# Patient Record
Sex: FEMALE | Race: WHITE | NOT HISPANIC OR LATINO | Employment: OTHER | URBAN - METROPOLITAN AREA
[De-identification: names, ages, dates, MRNs, and addresses within clinical notes are randomized per-mention and may not be internally consistent; named-entity substitution may affect disease eponyms.]

---

## 2017-01-11 ENCOUNTER — OFFICE VISIT (OUTPATIENT)
Dept: FAMILY MEDICINE | Facility: CLINIC | Age: 67
End: 2017-01-11

## 2017-01-11 VITALS
RESPIRATION RATE: 16 BRPM | SYSTOLIC BLOOD PRESSURE: 120 MMHG | TEMPERATURE: 98.2 F | HEIGHT: 63 IN | DIASTOLIC BLOOD PRESSURE: 80 MMHG | BODY MASS INDEX: 31.71 KG/M2 | HEART RATE: 64 BPM | OXYGEN SATURATION: 97 % | WEIGHT: 179 LBS

## 2017-01-11 DIAGNOSIS — M19.071 PRIMARY OSTEOARTHRITIS OF BOTH FEET: ICD-10-CM

## 2017-01-11 DIAGNOSIS — M19.072 PRIMARY OSTEOARTHRITIS OF BOTH FEET: ICD-10-CM

## 2017-01-11 DIAGNOSIS — K21.9 GASTROESOPHAGEAL REFLUX DISEASE WITHOUT ESOPHAGITIS: ICD-10-CM

## 2017-01-11 DIAGNOSIS — I10 ESSENTIAL HYPERTENSION, BENIGN: ICD-10-CM

## 2017-01-11 DIAGNOSIS — J45.30 MILD PERSISTENT ASTHMA WITHOUT COMPLICATION: ICD-10-CM

## 2017-01-11 DIAGNOSIS — E78.2 MIXED HYPERLIPIDEMIA: Primary | ICD-10-CM

## 2017-01-11 DIAGNOSIS — Z76.0 ENCOUNTER FOR MEDICATION REFILL: ICD-10-CM

## 2017-01-11 LAB
% GRANULOCYTES: 70.8 %
HCT VFR BLD AUTO: 46.3 % (ref 35–47)
HEMOGLOBIN: 15.7 G/DL (ref 11.7–15.7)
LYMPHOCYTES NFR BLD AUTO: 22.1 %
MCH RBC QN AUTO: 30.8 PG (ref 26–33)
MCHC RBC AUTO-ENTMCNC: 33.9 G/DL (ref 31–36)
MCV RBC AUTO: 90.9 FL (ref 78–100)
MONOCYTES NFR BLD AUTO: 7.1 %
PLATELET COUNT - QUEST: 310 10^9/L (ref 150–375)
RBC # BLD AUTO: 5.09 10*12/L (ref 3.8–5.2)
WBC # BLD AUTO: 7.4 10*9/L (ref 4–11)

## 2017-01-11 PROCEDURE — 99214 OFFICE O/P EST MOD 30 MIN: CPT | Performed by: FAMILY MEDICINE

## 2017-01-11 PROCEDURE — 80061 LIPID PANEL: CPT | Mod: 90 | Performed by: FAMILY MEDICINE

## 2017-01-11 PROCEDURE — 36415 COLL VENOUS BLD VENIPUNCTURE: CPT | Performed by: FAMILY MEDICINE

## 2017-01-11 PROCEDURE — 80053 COMPREHEN METABOLIC PANEL: CPT | Mod: 90 | Performed by: FAMILY MEDICINE

## 2017-01-11 PROCEDURE — 85025 COMPLETE CBC W/AUTO DIFF WBC: CPT | Performed by: FAMILY MEDICINE

## 2017-01-11 RX ORDER — ATORVASTATIN CALCIUM 20 MG/1
20 TABLET, FILM COATED ORAL DAILY
Qty: 90 TABLET | Refills: 1 | Status: SHIPPED | OUTPATIENT
Start: 2017-01-11 | End: 2017-07-06

## 2017-01-11 RX ORDER — MELOXICAM 15 MG/1
1 TABLET ORAL DAILY
COMMUNITY
Start: 2016-12-20 | End: 2017-12-21

## 2017-01-11 RX ORDER — TRIAMTERENE/HYDROCHLOROTHIAZID 37.5-25 MG
1 TABLET ORAL DAILY
Qty: 90 TABLET | Refills: 1 | Status: SHIPPED | OUTPATIENT
Start: 2017-01-11 | End: 2017-07-06

## 2017-01-11 RX ORDER — ALBUTEROL SULFATE 90 UG/1
1-2 AEROSOL, METERED RESPIRATORY (INHALATION) EVERY 4 HOURS PRN
Qty: 2 INHALER | Refills: 1 | Status: SHIPPED | OUTPATIENT
Start: 2017-01-11 | End: 2018-02-07

## 2017-01-11 NOTE — PROGRESS NOTES
"1.SUBJECTIVE:  Priscilla Linda is an 66 year old female who presents for evaluation of   Hyperlipidemia, hypertension, and GERD.   See second note regarding asthma.    She also needs her asthma medications renewed.  She has been diagnosed in the past as having   combined hyperlipidemia. She indicates that she is feeling well   and denies any symptoms of cardiovascular disease. Specifically   denies chest pain, palpitations, dyspnea, orthopnea, PND,   claudication or peripheral edema. Treatment modalities employed to   this point include diet, regular aerobic exercise, weight loss and Lipitor. Current medication   regimen is as listed below. Patient denies any side effects of   medication.    Family history: positive for hypertension, diabetes mellitus and cardiovascular disease  Age at diagnosis of hyperlipidemia: 56 and hypertension age 50  Cardiovascular risk factors: family history, lipids, hypertension, obesity and sedentary life style    Current Outpatient Prescriptions   Medication     meloxicam (MOBIC) 15 MG tablet     triamterene-hydrochlorothiazide (MAXZIDE-25) 37.5-25 MG per tablet     omeprazole (PRILOSEC) 20 MG capsule     ASPIRIN NOT PRESCRIBED (INTENTIONAL)     acyclovir (ZOVIRAX) 800 MG tablet     albuterol (ALBUTEROL) 108 (90 BASE) MCG/ACT inhaler     PULMICORT FLEXHALER 180 MCG/ACT inhaler     atorvastatin (LIPITOR) 20 MG tablet     Lutein 10 MG TABS     MULTI-VITAMIN/IRON OR TABS     FISH OIL 1360 MG OR CAPS     No current facility-administered medications for this visit.     No Known Allergies    Social History   Substance Use Topics     Smoking status: Never Smoker      Smokeless tobacco: Never Used     Alcohol Use: 0.0 oz/week     0 drink(s) per week      Comment: very occ       OBJECTIVE:  /80 mmHg  Pulse 64  Temp(Src) 98.2  F (36.8  C) (Oral)  Resp 16  Ht 1.6 m (5' 3\")  Wt 81.194 kg (179 lb)  BMI 31.72 kg/m2  SpO2 97%  Repeat BP R arm seated = 120/80 with regular size " cuff.  Skin: negative  Fundi: deferred  Lungs: negative, Percussion normal. Good diaphragmatic excursion. Lungs clear  Heart: negative, PMI normal. No lifts, heaves, or thrills. RRR. No murmurs, clicks gallops or rub  Peripheral pulses: radial=4/4, femoral=4/4, popliteal=4/4, dorsalis pedis=4/4,  Abd; The abdomen is soft without tenderness, guarding, mass or organomegaly. Bowel sounds are normal. No CVA tenderness or inguinal adenopathy noted.  BMI : Body mass index is 31.72 kg/(m^2).    ASSESSMENT:  (E78.2) Mixed hyperlipidemia  (primary encounter diagnosis)  Plan: VENIPUNC FNGR,HEEL,EAR [57001], LIPID PANEL,         atorvastatin (LIPITOR) 20 MG tablet        1)  Medication: continue current medication regimen unchanged  2)  Low fat, low cholesterol diet  3)  Regular aerobic exercise  4)  Recheck in 6 months, sooner should new symptoms or   problems arise.    Patient Education: Reviewed risks of elevated lipids and principles   of treatment.        (I10) Essential hypertension, benign  Plan: VENIPUNC FNGR,HEEL,EAR [55358], COMPREHENSIVE         METABOLIC PANEL,         triamterene-hydrochlorothiazide (MAXZIDE-25)         37.5-25 MG per tablet        1)  Medication: continue current medication regimen unchanged  2)  Dietary sodium restriction  3)  Regular aerobic exercise  4)  Recheck in 6 months, sooner should new symptoms or   problems arise.    Patient Education: Reviewed risks of hypertension and principles of   treatment.        (K21.9) Gastroesophageal reflux disease without esophagitis  Plan: VENIPUNC FNGR,HEEL,EAR [85845], AUTO         HEMOGRAM/PLATE/DIFF [03593.000], omeprazole         (PRILOSEC) 20 MG CR capsule        Potential medication side effects were discussed with the patient; let me know if any occur.      (J45.30) Mild persistent asthma without complication  Comment: see note below  Plan: Asthma Action Plan (AAP), albuterol (ALBUTEROL)        108 (90 BASE) MCG/ACT Inhaler, budesonide          (PULMICORT FLEXHALER) 180 MCG/ACT inhaler,         Asthma Action Plan (AAP)        Start bid use of steroid inhaler and monitor response    (Z76.0) Encounter for medication refill  Plan: VENIPUNC FNGR,HEEL,EAR [86243], AUTO         HEMOGRAM/PLATE/DIFF [85970.000], LIPID PANEL,         COMPREHENSIVE METABOLIC PANEL            (M19.071,  M19.072) Primary osteoarthritis of both feet  Plan: meloxicam (MOBIC) 15 MG tablet, VENIPUNC         FNGR,HEEL,EAR [52328], COMPREHENSIVE METABOLIC         PANEL        I have reviewed the patient's medical history in detail and updated the computerized patient record.      Priscilla Linda is a 66 year old female who presents to the clinic for recheck on reactive airways disease.    Last exacerbation was 3 months ago.    Overall the patient feels improved with less wheezing and less shortness of breath since starting steroid inhaler. Finds herself coughing at night when she lays down. Only takes it in the morning instead of twice daily dosing    Current medications:  Current Outpatient Prescriptions   Medication Sig Dispense Refill     meloxicam (MOBIC) 15 MG tablet Take 1 tablet by mouth daily       triamterene-hydrochlorothiazide (MAXZIDE-25) 37.5-25 MG per tablet Take 1 tablet by mouth daily 90 tablet 1     omeprazole (PRILOSEC) 20 MG CR capsule Take 1 capsule (20 mg) by mouth daily 90 capsule 3     atorvastatin (LIPITOR) 20 MG tablet Take 1 tablet (20 mg) by mouth daily 90 tablet 1     albuterol (ALBUTEROL) 108 (90 BASE) MCG/ACT Inhaler Inhale 1-2 puffs into the lungs every 4 hours as needed 2 Inhaler 1     budesonide (PULMICORT FLEXHALER) 180 MCG/ACT inhaler Inhale 1 puff into the lungs 2 times daily 3 Inhaler 3     ASPIRIN NOT PRESCRIBED (INTENTIONAL) continuous prn for other Antiplatelet medication not prescribed intentionally due to Refusal by patient and Not indicated based on age       acyclovir (ZOVIRAX) 800 MG tablet For outbreaks 24 tablet 1     Lutein 10 MG TABS     "    MULTI-VITAMIN/IRON OR TABS 1 TABLET DAILY       FISH OIL 1360 MG OR CAPS 1 CAPSULE DAILY       [DISCONTINUED] triamterene-hydrochlorothiazide (MAXZIDE-25) 37.5-25 MG per tablet Take 1 tablet by mouth daily 90 tablet 1     [DISCONTINUED] albuterol (ALBUTEROL) 108 (90 BASE) MCG/ACT inhaler Inhale 1-2 puffs into the lungs every 4 hours as needed 1 Inhaler 1     [DISCONTINUED] PULMICORT FLEXHALER 180 MCG/ACT inhaler INHALE 1 PUFF EVERY DAY 1 Inhaler 2     [DISCONTINUED] atorvastatin (LIPITOR) 20 MG tablet Take 1 tablet (20 mg) by mouth daily 90 tablet 3       Past Medical History:  Past Medical History   Diagnosis Date     Asthma      High cholesterol      Hypertension      GERD (gastroesophageal reflux disease)        EXAM:  VITALS: Blood pressure 120/80, pulse 64, temperature 98.2  F (36.8  C), temperature source Oral, resp. rate 16, height 1.6 m (5' 3\"), weight 81.194 kg (179 lb), SpO2 97 %.  RESP: Normal - Clear to auscultation without rales, rhonchi, or wheezing.  CARDIAC: NORMAL - regular rate and rhythm without murmur.  ABDOMEN: Abdomen soft, non-tender. BS normal. No masses, organomegaly    Assessment:  Reactive Airways, Improving    Plan:  Patient received information on asthma today  Steroid inhaler started at twice daily dosing/ monitor  Follow up in 1 year.  "

## 2017-01-11 NOTE — NURSING NOTE
Patient is here for a recheck of their medication.  Pre-Visit Screening :  Immunizations : up to date    Colonoscopy : is up to date  Mammogram : is up to date  Asthma Action Test/Plan : na  PHQ9/GAD7 :  na    BP done on the left arm, with a lg sized cuff.  Pulse - regular  My Chart - accepts    CLASSIFICATION OF OVERWEIGHT AND OBESITY BY BMI                         Obesity Class           BMI(kg/m2)  Underweight                                    < 18.5  Normal                                         18.5-24.9  Overweight                                     25.0-29.9  OBESITY                     I                  30.0-34.9                              II                 35.0-39.9  EXTREME OBESITY             III                >40                             Patient's  BMI Body mass index is 31.72 kg/(m^2).  http://hin.nhlbi.nih.gov/menuplanner/menu.cgi  Questioned patient about current smoking habits.  Pt. has never smoked.

## 2017-01-11 NOTE — PATIENT INSTRUCTIONS
1)  Medication: continue current medication regimen unchanged  2)  Low fat, low cholesterol diet and low salt  3)  Regular aerobic exercise and weight loss  4)  Recheck in 6 months, sooner should new symptoms or   problems arise.    Patient Education: Reviewed risks of elevated lipids and principles   of treatment.    Patient received information on asthma today  Steroid inhaler started at twice daily dosing/ monitor  Follow up in 1 year

## 2017-01-12 LAB
ALBUMIN SERPL-MCNC: 4.8 G/DL (ref 3.6–5.1)
ALBUMIN/GLOB SERPL: 1.9 (CALC) (ref 1–2.5)
ALP SERPL-CCNC: 112 U/L (ref 33–130)
ALT SERPL-CCNC: 38 U/L (ref 6–29)
AST SERPL-CCNC: 30 U/L (ref 10–35)
BILIRUB SERPL-MCNC: 0.9 MG/DL (ref 0.2–1.2)
BUN SERPL-MCNC: 22 MG/DL (ref 7–25)
BUN/CREATININE RATIO: ABNORMAL (CALC) (ref 6–22)
CALCIUM SERPL-MCNC: 10.5 MG/DL (ref 8.6–10.4)
CHLORIDE SERPLBLD-SCNC: 103 MMOL/L (ref 98–110)
CHOLEST SERPL-MCNC: 268 MG/DL (ref 125–200)
CHOLEST/HDLC SERPL: 3.2 (CALC)
CO2 SERPL-SCNC: 25 MMOL/L (ref 20–31)
CREAT SERPL-MCNC: 0.89 MG/DL (ref 0.5–0.99)
EGFR AFRICAN AMERICAN - QUEST: 79 ML/MIN/1.73M2
GFR SERPL CREATININE-BSD FRML MDRD: 68 ML/MIN/1.73M2
GLOBULIN, CALCULATED - QUEST: 2.5 G/DL (CALC) (ref 1.9–3.7)
GLUCOSE - QUEST: 98 MG/DL (ref 65–99)
HDLC SERPL-MCNC: 85 MG/DL
LDLC SERPL CALC-MCNC: 143 MG/DL (CALC)
NONHDLC SERPL-MCNC: 183 MG/DL (CALC)
POTASSIUM SERPL-SCNC: 4.8 MMOL/L (ref 3.5–5.3)
PROT SERPL-MCNC: 7.3 G/DL (ref 6.1–8.1)
SODIUM SERPL-SCNC: 141 MMOL/L (ref 135–146)
TRIGL SERPL-MCNC: 200 MG/DL

## 2017-01-12 ASSESSMENT — ASTHMA QUESTIONNAIRES: ACT_TOTALSCORE: 25

## 2017-05-02 ENCOUNTER — MYC MEDICAL ADVICE (OUTPATIENT)
Dept: FAMILY MEDICINE | Facility: CLINIC | Age: 67
End: 2017-05-02

## 2017-05-02 NOTE — TELEPHONE ENCOUNTER
Ayala bonillauise is generally very calm- inside passage    I would recommend taking dramamine pills prn rather than commit to a patch that will most likely not be needed.     If this is not acceptable, would advise discussing further    Please call

## 2017-05-02 NOTE — TELEPHONE ENCOUNTER
From: Priscilla Linda  To: Beatrice Benz MD  Sent: 5/2/2017 9:32 AM CDT  Subject: Scopalamine patch    Gene and I are going to [place] in a couple of weeks. We are so excited as we have never been on a cruise or to [place]. I get motion sick very easily so am worried about the cruise part which is seven days. Could you send a prescription to [facility] for the patches? I used a couple a number of years ago. Thanks!

## 2017-05-09 ENCOUNTER — MYC MEDICAL ADVICE (OUTPATIENT)
Dept: FAMILY MEDICINE | Facility: CLINIC | Age: 67
End: 2017-05-09

## 2017-05-09 NOTE — TELEPHONE ENCOUNTER
From: Priscilla Linda  To: Beatrice Benz MD  Sent: 5/9/2017 1:35 PM CDT  Subject: Scopalamine patches    I have to say that your response to my request for some patches offended me. It seemed that you were trying to tell me you know my motion sickness better than I do. I get motion sick very easily and would rather be safe than sorry. So I am asking again for a prescription to be sent to [facility].. Scooter Dean

## 2017-07-06 ENCOUNTER — OFFICE VISIT (OUTPATIENT)
Dept: FAMILY MEDICINE | Facility: CLINIC | Age: 67
End: 2017-07-06

## 2017-07-06 VITALS
DIASTOLIC BLOOD PRESSURE: 86 MMHG | WEIGHT: 169.6 LBS | BODY MASS INDEX: 30.05 KG/M2 | SYSTOLIC BLOOD PRESSURE: 136 MMHG | TEMPERATURE: 98 F | HEART RATE: 82 BPM | HEIGHT: 63 IN | OXYGEN SATURATION: 98 %

## 2017-07-06 DIAGNOSIS — E83.52 SERUM CALCIUM ELEVATED: ICD-10-CM

## 2017-07-06 DIAGNOSIS — E78.2 MIXED HYPERLIPIDEMIA: ICD-10-CM

## 2017-07-06 DIAGNOSIS — B00.9 HERPES SIMPLEX VIRUS (HSV) INFECTION: ICD-10-CM

## 2017-07-06 DIAGNOSIS — Z00.00 ROUTINE GENERAL MEDICAL EXAMINATION AT A HEALTH CARE FACILITY: Primary | ICD-10-CM

## 2017-07-06 DIAGNOSIS — I10 ESSENTIAL HYPERTENSION, BENIGN: ICD-10-CM

## 2017-07-06 DIAGNOSIS — K21.9 GASTROESOPHAGEAL REFLUX DISEASE, ESOPHAGITIS PRESENCE NOT SPECIFIED: ICD-10-CM

## 2017-07-06 LAB
ERYTHROCYTE [DISTWIDTH] IN BLOOD BY AUTOMATED COUNT: 11.9 %
HCT VFR BLD AUTO: 48.2 % (ref 35–47)
HELIOBACTER PYLORI ANTIBODY SCREEN: NORMAL
HEMOGLOBIN: 16.6 G/DL (ref 11.7–15.7)
MCH RBC QN AUTO: 31.1 PG (ref 26–33)
MCHC RBC AUTO-ENTMCNC: 34.4 G/DL (ref 31–36)
MCV RBC AUTO: 90.4 FL (ref 78–100)
PLATELET COUNT - QUEST: 230 10^9/L (ref 150–375)
RBC # BLD AUTO: 5.33 10*12/L (ref 3.8–5.2)
WBC # BLD AUTO: 6.9 10*9/L (ref 4–11)

## 2017-07-06 PROCEDURE — 99397 PER PM REEVAL EST PAT 65+ YR: CPT | Performed by: FAMILY MEDICINE

## 2017-07-06 PROCEDURE — 86677 HELICOBACTER PYLORI ANTIBODY: CPT | Performed by: FAMILY MEDICINE

## 2017-07-06 PROCEDURE — 80061 LIPID PANEL: CPT | Mod: 90 | Performed by: FAMILY MEDICINE

## 2017-07-06 PROCEDURE — 36415 COLL VENOUS BLD VENIPUNCTURE: CPT | Performed by: FAMILY MEDICINE

## 2017-07-06 PROCEDURE — 85027 COMPLETE CBC AUTOMATED: CPT | Performed by: FAMILY MEDICINE

## 2017-07-06 PROCEDURE — 80048 BASIC METABOLIC PNL TOTAL CA: CPT | Mod: 90 | Performed by: FAMILY MEDICINE

## 2017-07-06 RX ORDER — ACYCLOVIR 800 MG/1
TABLET ORAL
Qty: 24 TABLET | Refills: 1 | Status: SHIPPED | OUTPATIENT
Start: 2017-07-06 | End: 2017-09-02

## 2017-07-06 RX ORDER — ATORVASTATIN CALCIUM 20 MG/1
20 TABLET, FILM COATED ORAL DAILY
Qty: 90 TABLET | Refills: 3 | Status: SHIPPED | OUTPATIENT
Start: 2017-07-06 | End: 2018-07-11

## 2017-07-06 RX ORDER — TRIAMTERENE/HYDROCHLOROTHIAZID 37.5-25 MG
1 TABLET ORAL DAILY
Qty: 90 TABLET | Refills: 1 | Status: SHIPPED | OUTPATIENT
Start: 2017-07-06 | End: 2018-02-07

## 2017-07-06 NOTE — NURSING NOTE
"Priscilla Linda is here for a CPX. Fasting: .    Pre-visit Planning  Immunizations -up to date  Colonoscopy -is up to date  Mammogram -is up to date  Asthma test --UTD  PHQ2 -is completed today  JEFFY 7 -NA  Fall Risk Assessment -NA  CAGE: Completed Today  Vitals:  BP Cuff left  Arm with regular Cuff  PULSE regular  169 lbs 9.6 oz and 5' 2.75\"  CLASSIFICATION OF OVERWEIGHT AND OBESITY BY BMI                        Obesity Class           BMI(kg/m2)  Underweight                                    < 18.5  Normal                                         18.5-24.9  Overweight                                     25.0-29.9  OBESITY                     I                  30.0-34.9                             II                 35.0-39.9  EXTREME OBESITY             III                >40      Patient's  BMI Body mass index is 30.28 kg/(m^2).  Http://hin.nhlbi.nih.gov/menuplanner/menu.cgi      Roomed By: JUAN Morel (AAMA)    "

## 2017-07-06 NOTE — MR AVS SNAPSHOT
After Visit Summary   7/6/2017    Priscilla Linda    MRN: [id number]           Patient Information     Date Of Birth          1950        Visit Information        Provider Department      7/6/2017 8:00 AM Beatrice Benz MD [facility]        Today's Diagnoses     Routine general medical examination at a health care facility    -  1    Mixed hyperlipidemia        Essential hypertension, benign        Serum calcium elevated        Herpes simplex virus (HSV) infection        Non morbid obesity due to excess calories        Gastroesophageal reflux disease, esophagitis presence not specified          Care Instructions    Try alternating omeprazole and ranitidine (300 mg) every other day for a month or two  If symptoms are controlled- try ranitidine daily          Follow-ups after your visit        Who to contact     If you have questions or need follow up information about today's clinic visit or your schedule please contact [facility] directly at [phone].  Normal or non-critical lab and imaging results will be communicated to you by MyChart, letter or phone within 4 business days after the clinic has received the results. If you do not hear from us within 7 days, please contact the clinic through famPlushart or phone. If you have a critical or abnormal lab result, we will notify you by phone as soon as possible.  Submit refill requests through [employer] or call your pharmacy and they will forward the refill request to us. Please allow 3 business days for your refill to be completed.          Additional Information About Your Visit        MyChart Information     [employer] gives you secure access to your electronic health record. If you see a primary care provider, you can also send messages to your care team and make appointments. If you have questions, please call your primary care clinic.  If you do not have a primary care provider, please call  "[phone] and they will assist you.        Care EveryWhere ID     This is your Care EveryWhere ID. This could be used by other organizations to access your [facility] medical records  [id number]        Your Vitals Were     Pulse Temperature Height Pulse Oximetry Breastfeeding? BMI (Body Mass Index)    82 98  F (36.7  C) (Oral) 1.594 m (5' 2.75\") 98% No 30.28 kg/m2       Blood Pressure from Last 3 Encounters:   07/06/17 136/86   01/11/17 120/80   05/11/16 128/84    Weight from Last 3 Encounters:   07/06/17 76.9 kg (169 lb 9.6 oz)   01/11/17 81.2 kg (179 lb)   05/11/16 75.3 kg (166 lb)              We Performed the Following     BASIC METABOLIC PANEL (QUEST)     CL AFF HELICOBACTER PYLORI (BFP)     HEMOGRAM/PLATELET (BFP)     Lipid Profile     VENOUS COLLECTION          Today's Medication Changes          These changes are accurate as of: 7/6/17  8:42 AM.  If you have any questions, ask your nurse or doctor.               Start taking these medicines.        Dose/Directions    ranitidine 300 MG tablet   Commonly known as:  ZANTAC   Used for:  Gastroesophageal reflux disease, esophagitis presence not specified   Started by:  Beatrice Benz MD        Dose:  300 mg   Take 1 tablet (300 mg) by mouth At Bedtime   Quantity:  90 tablet   Refills:  3            Where to get your medicines      These medications were sent to [id number] - [place], [place] - [address]  [address], [facility] [id number]     Phone:  [phone]     acyclovir 800 MG tablet    atorvastatin 20 MG tablet    ranitidine 300 MG tablet    triamterene-hydrochlorothiazide 37.5-25 MG per tablet                Primary Care Provider Office Phone # Fax #    Beatrice Benz [phone]       [facility] [address]   [facility] [address]        Equal Access to Services     СВЕТЛАНА WOODARD AH: Hadii kilo catalan hadasho Soomaali, waaxda luqadaha, qaybta kaalmada adeegyada, juan antonio zimmerman " reji lakefrantzamisha wallsCynaamarcin ah. So [facility] [phone].    ATENCIÓN: Si radhala harmeet, tiene a petty disposición servicios gratuitos de asistencia lingüística. Shahrzad al [phone].    We comply with applicable federal civil rights laws and [place] laws. We do not discriminate on the basis of race, color, national origin, age, disability sex, sexual orientation or gender identity.            Thank you!     Thank you for choosing [facility]  for your care. Our goal is always to provide you with excellent care. Hearing back from our patients is one way we can continue to improve our services. Please take a few minutes to complete the written survey that you may receive in the mail after your visit with us. Thank you!             Your Updated Medication List - Protect others around you: Learn how to safely use, store and throw away your medicines at www.disposemymeds.org.          This list is accurate as of: 7/6/17  8:42 AM.  Always use your most recent med list.                   Brand Name Dispense Instructions for use Diagnosis    acyclovir 800 MG tablet    ZOVIRAX    24 tablet    For outbreaks    Herpes simplex virus (HSV) infection       albuterol 108 (90 BASE) MCG/ACT Inhaler    albuterol    2 Inhaler    Inhale 1-2 puffs into the lungs every 4 hours as needed    Mild persistent asthma without complication       ASPIRIN NOT PRESCRIBED    INTENTIONAL     continuous prn for other Antiplatelet medication not prescribed intentionally due to Refusal by patient and Not indicated based on age        atorvastatin 20 MG tablet    LIPITOR    90 tablet    Take 1 tablet (20 mg) by mouth daily    Mixed hyperlipidemia       budesonide 180 MCG/ACT inhaler    PULMICORT FLEXHALER    3 Inhaler    Inhale 1 puff into the lungs 2 times daily    Mild persistent asthma without complication       DOXYCYCLINE PO      Take 100 mg by mouth        Lutein 10 MG Tabs           meloxicam 15 MG tablet    MOBIC     Take 1 tablet by  mouth daily    Primary osteoarthritis of both feet       MULTI-vitamin  /IRON Tabs      1 TABLET DAILY        omeprazole 20 MG CR capsule    priLOSEC    90 capsule    Take 1 capsule (20 mg) by mouth daily    Gastroesophageal reflux disease without esophagitis       ranitidine 300 MG tablet    ZANTAC    90 tablet    Take 1 tablet (300 mg) by mouth At Bedtime    Gastroesophageal reflux disease, esophagitis presence not specified       triamterene-hydrochlorothiazide 37.5-25 MG per tablet    MAXZIDE-25    90 tablet    Take 1 tablet by mouth daily    Essential hypertension, benign

## 2017-07-06 NOTE — PATIENT INSTRUCTIONS
Try alternating omeprazole and ranitidine (300 mg) every other day for a month or two  If symptoms are controlled- try ranitidine daily

## 2017-07-06 NOTE — PROGRESS NOTES
Chief Complaint: Priscilla Linda is an 66 year old woman who presents for preventive health visit.      Besides routine health maintenance,  she would like to discuss .     Intentional successful weight loss  increased exercise, eating less    [place]- did have problems with motion sickness, controlled with dramamine    Saw Dr Wen for chronic foot pain- diagnosed with arthritis  On meloxicam since October- no change in symptoms- still takes prn  Cortisone Injection in April-pain was better for six weeks- consider fusion in future    Has a cutaneous lump right inferior arm- about a cm- recheck in a month in office if still has concerns    Recheck of hypertension- lipids`  Monitor creatinine on Mobic  ACT screening up to date    Chronic PPI use- night time reflux  Tried to reduce omeprazole dosing to every other day- did OK  Ranitidine failure of OTC dose  Will try to alternate ranitidine 300 mg every other day and possibly daily ranitidine if successful  Consider referral- if persistent nausea or GERD symptoms       Health Care Maintenance  Pap smear; NA- total abdominal hysterectomy  Mammogram  11/2016  Colonoscopy 2016  Hep C: 2014- completed  Immunizations_ due for pneumococcal booster 10/2019      Healthy Habits:  Do you get at least three servings of calcium containing foods daily (dairy, green leafy vegetables, etc.)? yes  Outside of work or daily activities, how many days per week do you exercise for 30 minutes or longer? Six days a week- riding bike  Have you had an eye exam in the past two years? Yes- has had cataract surgery  Do you see a dentist twice per year? yes    PHQ-2  Over the last two weeks- Have you been bothered by little interest or pleasure in doing things?  No  Over the last two weeks- Have you been feeling down, depressed, or hopeless?  No         Advanced directive: completed / advised to bring    Social History   Substance Use Topics     Smoking status: Never Smoker      "Smokeless tobacco: Never Used     Alcohol use 0.0 oz/week     0 Standard drinks or equivalent per week      Comment: very occ         Reviewed orders with patient.  Reviewed health maintenance and updated orders accordingly - Yes      History of abnormal Pap smear: NA-   All Histories reviewed and updated in Epic.      ROS:  C: NEGATIVE for fever, chills, change in weight  I: NEGATIVE for worrisome rashes, moles or lesions  E: NEGATIVE for vision changes or irritation/ motion sickness  ENT: NEGATIVE for ear, mouth and throat problems  R: NEGATIVE for significant cough or SOB- asthma under control-  B: NEGATIVE for masses, tenderness or discharge  CV: NEGATIVE for chest pain, palpitations or peripheral edema  GI: POSTIVE for nausea, abdominal pain, heartburn - on PPI  : NEGATIVE for unusual urinary or vaginal symptoms. No vaginal bleeding.  M: POSITIVE for foot pain  N: NEGATIVE for weakness, dizziness or paresthesias/ motion sickness, relieved with dramamine  P: NEGATIVE for changes in mood or affect     COGNITIVE SCREEN  1) Repeat 3 items (Banana, Sunrise, Chair)    2) Clock draw: NORMAL  3) 3 item recall:   Recalls 3 objects  Results: 3 items recalled: COGNITIVE IMPAIRMENT LESS LIKELY    Mini-CogTM Copyright S Leslee. Licensed by the author for use in [facility]; reprinted with permission (vaibhav@.[facility]). All rights reserved.          OBJECTIVE:  /86 (BP Location: Left arm, Patient Position: Chair, Cuff Size: Adult Regular)  Pulse 82  Temp 98  F (36.7  C) (Oral)  Ht 1.594 m (5' 2.75\")  Wt 76.9 kg (169 lb 9.6 oz)  SpO2 98%  Breastfeeding? No  BMI 30.28 kg/m2  General appearance: Healthy    Skin: Normal. No atypical appearing moles on inspection of trunk and extremities.    External ears  and canals clear bilaterally. TM's normal bilaterally. Nose normal without lesions or discharge. Oropharynx normal. Neck supple without palpable adenopathy.    Breasts are symmetric.  No dominant, " discrete, fixed  or suspicious masses are noted.  No skin or nipple changes or axillary nodes. Self exam is taught and encouraged.    Regular rate and  rhythm. S1 and S2 normal, no murmurs, clicks, gallops or rubs. No edema or JVD. Chest is clear; no wheezes or rales.    The abdomen is soft without tenderness, guarding, mass or organomegaly. Bowel sounds are normal. No CVA tenderness or inguinal adenopathy noted.    Pelvic:  Vagina and vulva are normal.   No palpable uterine or adnexal masses or tenderness.  Pap obtained and pending.    Rectal exam: normal    Extremities: negative.      COUNSELING:  Reviewed preventive health counseling, as reflected in patient instructions       Regular exercise       Healthy diet/nutrition       Osteoporosis Prevention/Bone Health       Advance Care Planning    ATP III Guidelines  ICSI Preventive Guidelines    ASSESSMENT/PLAN:  (Z00.00) Routine general medical examination at a health care facility  (primary encounter diagnosis)  Comment:   Plan: HEMOGRAM/PLATELET (BFP)            (E78.2) Mixed hyperlipidemia  Comment:   Plan: Lipid Profile, VENOUS COLLECTION, atorvastatin         (LIPITOR) 20 MG tablet            (I10) Essential hypertension, benign  Comment:   Plan: BASIC METABOLIC PANEL (QUEST), VENOUS         COLLECTION, triamterene-hydrochlorothiazide         (MAXZIDE-25) 37.5-25 MG per tablet            (K21.9) Gastroesophageal reflux disease, esophagitis presence not specified  Comment:   Plan: ranitidine (ZANTAC) 300 MG tablet, CL AFF         HELICOBACTER PYLORI (BFP)            (E83.52) Serum calcium elevated  Comment:   Plan: Basic profile- monitor creatinine with regular ansaid dosing    (B00.9) Herpes simplex virus (HSV) infection  Comment:   Plan: acyclovir (ZOVIRAX) 800 MG tablet

## 2017-07-07 LAB
BUN SERPL-MCNC: 20 MG/DL (ref 7–25)
BUN/CREATININE RATIO: ABNORMAL (CALC) (ref 6–22)
CALCIUM SERPL-MCNC: 10.4 MG/DL (ref 8.6–10.4)
CHLORIDE SERPLBLD-SCNC: 104 MMOL/L (ref 98–110)
CHOLEST SERPL-MCNC: 271 MG/DL (ref 125–200)
CHOLEST/HDLC SERPL: 2.7 (CALC)
CO2 SERPL-SCNC: 23 MMOL/L (ref 20–31)
CREAT SERPL-MCNC: 0.88 MG/DL (ref 0.5–0.99)
EGFR AFRICAN AMERICAN - QUEST: 80 ML/MIN/1.73M2
GFR SERPL CREATININE-BSD FRML MDRD: 69 ML/MIN/1.73M2
GLUCOSE - QUEST: 103 MG/DL (ref 65–99)
HDLC SERPL-MCNC: 100 MG/DL
LDLC SERPL CALC-MCNC: 137 MG/DL (CALC)
NONHDLC SERPL-MCNC: 171 MG/DL (CALC)
POTASSIUM SERPL-SCNC: 3.9 MMOL/L (ref 3.5–5.3)
SODIUM SERPL-SCNC: 141 MMOL/L (ref 135–146)
TRIGL SERPL-MCNC: 172 MG/DL

## 2017-09-02 DIAGNOSIS — B00.9 HERPES SIMPLEX VIRUS (HSV) INFECTION: ICD-10-CM

## 2017-09-02 RX ORDER — ACYCLOVIR 800 MG/1
TABLET ORAL
Qty: 24 TABLET | Refills: 1 | COMMUNITY
Start: 2017-09-02 | End: 2018-12-07

## 2017-11-09 ENCOUNTER — HOSPITAL ENCOUNTER (OUTPATIENT)
Dept: MAMMOGRAPHY | Facility: CLINIC | Age: 67
Discharge: HOME | End: 2017-11-09
Attending: FAMILY MEDICINE | Admitting: FAMILY MEDICINE
Payer: MEDICARE

## 2017-11-09 DIAGNOSIS — Z12.39 BREAST CANCER SCREENING: ICD-10-CM

## 2017-11-09 PROCEDURE — G0202 SCR MAMMO BI INCL CAD: HCPCS

## 2017-12-11 ENCOUNTER — TRANSFERRED RECORDS (OUTPATIENT)
Dept: FAMILY MEDICINE | Facility: CLINIC | Age: 67
End: 2017-12-11

## 2017-12-21 ENCOUNTER — OFFICE VISIT (OUTPATIENT)
Dept: FAMILY MEDICINE | Facility: CLINIC | Age: 67
End: 2017-12-21

## 2017-12-21 VITALS
HEART RATE: 72 BPM | WEIGHT: 166 LBS | DIASTOLIC BLOOD PRESSURE: 78 MMHG | TEMPERATURE: 98.2 F | SYSTOLIC BLOOD PRESSURE: 112 MMHG | RESPIRATION RATE: 20 BRPM | HEIGHT: 63 IN | BODY MASS INDEX: 29.41 KG/M2

## 2017-12-21 DIAGNOSIS — J45.30 MILD PERSISTENT ASTHMA WITHOUT COMPLICATION: ICD-10-CM

## 2017-12-21 DIAGNOSIS — M67.432 GANGLION OF WRIST, LEFT: ICD-10-CM

## 2017-12-21 DIAGNOSIS — Z01.818 PRE-OP EXAM: Primary | ICD-10-CM

## 2017-12-21 DIAGNOSIS — Z83.49 FAMILY HISTORY OF HEMOCHROMATOSIS: ICD-10-CM

## 2017-12-21 LAB
ERYTHROCYTE [DISTWIDTH] IN BLOOD BY AUTOMATED COUNT: 11.2 %
HCT VFR BLD AUTO: 48.6 % (ref 35–47)
HEMOGLOBIN: 16.2 G/DL (ref 11.7–15.7)
MCH RBC QN AUTO: 31.2 PG (ref 26–33)
MCHC RBC AUTO-ENTMCNC: 33.3 G/DL (ref 31–36)
MCV RBC AUTO: 93.6 FL (ref 78–100)
PLATELET COUNT - QUEST: 353 10^9/L (ref 150–375)
RBC # BLD AUTO: 5.19 10*12/L (ref 3.8–5.2)
WBC # BLD AUTO: 7.6 10*9/L (ref 4–11)

## 2017-12-21 PROCEDURE — 93000 ELECTROCARDIOGRAM COMPLETE: CPT | Performed by: FAMILY MEDICINE

## 2017-12-21 PROCEDURE — 99214 OFFICE O/P EST MOD 30 MIN: CPT | Performed by: FAMILY MEDICINE

## 2017-12-21 PROCEDURE — 85027 COMPLETE CBC AUTOMATED: CPT | Performed by: FAMILY MEDICINE

## 2017-12-21 PROCEDURE — 82728 ASSAY OF FERRITIN: CPT | Mod: 90 | Performed by: FAMILY MEDICINE

## 2017-12-21 PROCEDURE — 80048 BASIC METABOLIC PNL TOTAL CA: CPT | Mod: 90 | Performed by: FAMILY MEDICINE

## 2017-12-21 PROCEDURE — 36415 COLL VENOUS BLD VENIPUNCTURE: CPT | Performed by: FAMILY MEDICINE

## 2017-12-21 RX ORDER — DOXYCYCLINE HYCLATE 100 MG
50 TABLET ORAL DAILY
Qty: 20 TABLET | Refills: 0 | COMMUNITY
Start: 2017-12-21 | End: 2018-02-07

## 2017-12-21 NOTE — MR AVS SNAPSHOT
"              After Visit Summary   12/21/2017    Priscilla Linda    MRN: [id number]           Patient Information     Date Of Birth          1950        Visit Information        Provider Department      12/21/2017 11:45 AM Beatrice Benz MD [facility]        Today's Diagnoses     Pre-op exam    -  1    Mild persistent asthma without complication        Ganglion of wrist, left        Family history of hemochromatosis           Follow-ups after your visit        Who to contact     If you have questions or need follow up information about today's clinic visit or your schedule please contact [facility] directly at [phone].  Normal or non-critical lab and imaging results will be communicated to you by MyChart, letter or phone within 4 business days after the clinic has received the results. If you do not hear from us within 7 days, please contact the clinic through [employer] or phone. If you have a critical or abnormal lab result, we will notify you by phone as soon as possible.  Submit refill requests through [employer] or call your pharmacy and they will forward the refill request to us. Please allow 3 business days for your refill to be completed.          Additional Information About Your Visit        MyChart Information     [employer] gives you secure access to your electronic health record. If you see a primary care provider, you can also send messages to your care team and make appointments. If you have questions, please call your primary care clinic.  If you do not have a primary care provider, please call [phone] and they will assist you.        Care EveryWhere ID     This is your Care EveryWhere ID. This could be used by other organizations to access your [facility] medical records  [id number]        Your Vitals Were     Pulse Temperature Respirations Height BMI (Body Mass Index)       72 98.2  F (36.8  C) (Oral) 20 1.588 m (5' 2.5\") 29.88 kg/m2  "       Blood Pressure from Last 3 Encounters:   12/21/17 112/78   07/06/17 136/86   01/11/17 120/80    Weight from Last 3 Encounters:   12/21/17 75.3 kg (166 lb)   07/06/17 76.9 kg (169 lb 9.6 oz)   01/11/17 81.2 kg (179 lb)              We Performed the Following     Asthma Action Plan (AAP)     BASIC METABOLIC PANEL (QUEST)     EKG 12-lead complete w/read - Clinics     FERRITIN (QUEST)     HEMOGRAM/PLATELET (BFP)     VENOUS COLLECTION        Primary Care Provider Office Phone # Fax #    Beatrice Benz [phone] [address] [facility] [address]  [facility] [address]        Equal Access to Services     СВЕТЛАНА WOODARD : Hadii kilo catalan hadasho Soomaali, waaxda luqadaha, qaybta kaalmada adeegyada, waxay idiin erasmo sanchez. So [facility] [phone].    ATENCIÓN: Si habla español, tiene a petty disposición servicios gratuitos de asistencia lingüística. [facility] [phone].    We comply with applicable federal civil rights laws and [place] laws. We do not discriminate on the basis of race, color, national origin, age, disability, sex, sexual orientation, or gender identity.            Thank you!     Thank you for choosing [facility]  for your care. Our goal is always to provide you with excellent care. Hearing back from our patients is one way we can continue to improve our services. Please take a few minutes to complete the written survey that you may receive in the mail after your visit with us. Thank you!             Your Updated Medication List - Protect others around you: Learn how to safely use, store and throw away your medicines at www.disposemymeds.org.          This list is accurate as of: 12/21/17 11:59 PM.  Always use your most recent med list.                   Brand Name Dispense Instructions for use Diagnosis    acyclovir 800 MG tablet    ZOVIRAX    24 tablet    For outbreaks    Herpes simplex virus (HSV) infection       albuterol 108 (90 BASE)  MCG/ACT Inhaler    PROAIR HFA    2 Inhaler    Inhale 1-2 puffs into the lungs every 4 hours as needed    Mild persistent asthma without complication       ASPIRIN NOT PRESCRIBED    INTENTIONAL     continuous prn for other Antiplatelet medication not prescribed intentionally due to Refusal by patient and Not indicated based on age        atorvastatin 20 MG tablet    LIPITOR    90 tablet    Take 1 tablet (20 mg) by mouth daily    Mixed hyperlipidemia       budesonide 180 MCG/ACT inhaler    PULMICORT FLEXHALER    3 Inhaler    Inhale 1 puff into the lungs 2 times daily    Mild persistent asthma without complication       doxycycline 100 MG tablet    VIBRA-TABS    20 tablet    Take 0.5 tablets (50 mg) by mouth daily        Lutein 10 MG Tabs           metroNIDAZOLE 0.75 % cream    METROCREAM          MULTI-vitamin  /IRON Tabs      1 TABLET DAILY        ranitidine 300 MG tablet    ZANTAC    90 tablet    Take 1 tablet (300 mg) by mouth At Bedtime    Gastroesophageal reflux disease, esophagitis presence not specified       triamterene-hydrochlorothiazide 37.5-25 MG per tablet    MAXZIDE-25    90 tablet    Take 1 tablet by mouth daily    Essential hypertension, benign

## 2017-12-21 NOTE — LETTER
My Asthma Action Plan  Name: Priscilla Linda   YOB: 1950  Date: 12/21/2017   My doctor: Beatrice Benz MD   My clinic: [facility]        My Control Medicine: Budesonide (Pulmicort Flexhaler) -  180 mcg 1 puff BID  My Rescue Medicine: Albuterol (Proair/Ventolin/Proventil) inhaler 1 -2 puffs every 6 hours   My Asthma Severity: mild persistent  Avoid your asthma triggers: upper respiratory infections, pollens and animal dander               GREEN ZONE   Good Control    I feel good    No cough or wheeze    Can work, sleep and play without asthma symptoms       Take your asthma control medicine every day.     1. If exercise triggers your asthma, take your rescue medication    15 minutes before exercise or sports, and    During exercise if you have asthma symptoms  2. Spacer to use with inhaler: If you have a spacer, make sure to use it with your inhaler             YELLOW ZONE Getting Worse  I have ANY of these:    I do not feel good    Cough or wheeze    Chest feels tight    Wake up at night   1. Keep taking your Green Zone medications  2. Start taking your rescue medicine:    every 20 minutes for up to 1 hour. Then every 4 hours for 24-48 hours.  3. If you stay in the Yellow Zone for more than 12-24 hours, contact your doctor.  4. If you do not return to the Green Zone in 12-24 hours or you get worse, start taking your oral steroid medicine if prescribed by your provider.           RED ZONE Medical Alert - Get Help  I have ANY of these:    I feel awful    Medicine is not helping    Breathing getting harder    Trouble walking or talking    Nose opens wide to breathe       1. Take your rescue medicine NOW  2. If your provider has prescribed an oral steroid medicine, start taking it NOW  3. Call your doctor NOW  4. If you are still in the Red Zone after 20 minutes and you have not reached your doctor:    Take your rescue medicine again and    Call 911 or go to the emergency  room right away    See your regular doctor within 2 weeks of an Emergency Room or Urgent Care visit for follow-up treatment.        Electronically signed by: Maliha Beltran, December 21, 2017    Annual Reminders:  Meet with Asthma Educator,  Flu Shot in the Fall, consider Pneumonia Vaccination for patients with asthma (aged 19 and older).    Pharmacy: [id number] [place], [place] - [address] [address]                    Asthma Triggers  How To Control Things That Make Your Asthma Worse    Triggers are things that make your asthma worse.  Look at the list below to help you find your triggers and what you can do about them.  You can help prevent asthma flare-ups by staying away from your triggers.      Trigger                                                          What you can do   Cigarette Smoke  Tobacco smoke can make asthma worse. Do not allow smoking in your home, car or around you.  Be sure no one smokes at a child s day care or school.  If you smoke, ask your health care provider for ways to help you quit.  Ask family members to quit too.  Ask your health care provider for a referral to Quit Plan to help you quit smoking, or call 7-624-571-PLAN.     Colds, Flu, Bronchitis  These are common triggers of asthma. Wash your hands often.  Don t touch your eyes, nose or mouth.  Get a flu shot every year.     Dust Mites  These are tiny bugs that live in cloth or carpet. They are too small to see. Wash sheets and blankets in hot water every week.   Encase pillows and mattress in dust mite proof covers.  Avoid having carpet if you can. If you have carpet, vacuum weekly.   Use a dust mask and HEPA vacuum.   Pollen and Outdoor Mold  Some people are allergic to trees, grass, or weed pollen, or molds. Try to keep your windows closed.  Limit time out doors when pollen count is high.   Ask you health care provider about taking medicine during allergy season.     Animal Dander  Some people are allergic to skin  flakes, urine or saliva from pets with fur or feathers. Keep pets with fur or feathers out of your home.    If you can t keep the pet outdoors, then keep the pet out of your bedroom.  Keep the bedroom door closed.  Keep pets off cloth furniture and away from stuffed toys.     Mice, Rats, and Cockroaches  Some people are allergic to the waste from these pests.   Cover food and garbage.  Clean up spills and food crumbs.  Store grease in the refrigerator.   Keep food out of the bedroom.   Indoor Mold  This can be a trigger if your home has high moisture. Fix leaking faucets, pipes, or other sources of water.   Clean moldy surfaces.  Dehumidify basement if it is damp and smelly.   Smoke, Strong Odors, and Sprays  These can reduce air quality. Stay away from strong odors and sprays, such as perfume, powder, hair spray, paints, smoke incense, paint, cleaning products, candles and new carpet.   Exercise or Sports  Some people with asthma have this trigger. Be active!  Ask your doctor about taking medicine before sports or exercise to prevent symptoms.    Warm up for 5-10 minutes before and after sports or exercise.     Other Triggers of Asthma  Cold air:  Cover your nose and mouth with a scarf.  Sometimes laughing or crying can be a trigger.  Some medicines and food can trigger asthma.

## 2017-12-21 NOTE — PROGRESS NOTES
[facility]  [address]  [address]  [facility] [address]  [phone]  Dept: [phone]    PRE-OP EVALUATION:  Today's date: 2017    Priscilla Linda (: 1950) presents for pre-operative evaluation assessment as requested by Dr. Gillette  She requires evaluation and anesthesia risk assessment prior to undergoing surgery/procedure for treatment of soft tissue mass of left wrist .  Proposed procedure: excision    Date of Surgery/ Procedure: 17  Time of Surgery/ Procedure: 9am  Hospital/Surgical Facility: [facility]  Fax number for surgical facility: [phone]  Primary Physician: Beatrice Benz  Type of Anesthesia Anticipated: to be determined    Patient has a Health Care Directive or Living Will:  NO    1. NO - Do you have a history of heart attack, stroke, stent, bypass or surgery on an artery in the head, neck, heart or legs?  2. NO - Do you ever have any pain or discomfort in your chest?  3. NO - Do you have a history of  Heart Failure?  4. NO - Are you troubled by shortness of breath when: walking on the level, up a slight hill or at night?  5. NO - Do you currently have a cold, bronchitis or other respiratory infection?  6. YES - DO YOU HAVE A COUGH, SHORTNESS OF BREATH OR WHEEZING? History of asthma- controlled with daily inhaler  7. NO - Do you sometimes get pains in the calves of your legs when you walk?  8. YES - DO YOU OR ANYONE IN YOUR FAMILY HAVE PREVIOUS HISTORY OF BLOOD CLOTS? Brother has a history of DVT  9. NO - Do you or does anyone in your family have a serious bleeding problem such as prolonged bleeding following surgeries or cuts?  10. NO - Have you ever had problems with anemia or been told to take iron pills?  11. NO - Have you had any abnormal blood loss such as black, tarry or bloody stools, or abnormal vaginal bleeding?  12. NO - Have you ever had a blood transfusion?  13. NO - Have you or any of your relatives ever had  problems with anesthesia?  14. YES - DO YOU HAVE SLEEP APNEA, EXCESSIVE SNORING OR DAYTIME DROWSINESS? snores  15. NO - Do you have any prosthetic heart valves?  16. NO - Do you have prosthetic joints?  17. NO - Is there any chance that you may be pregnant?        HPI:                                                      Brief HPI related to upcoming procedure:  Soft tissue mass of wrist-         MEDICAL HISTORY:                                                    Patient Active Problem List    Diagnosis Date Noted     Health Care Home 10/17/2012     Priority: Medium     State Tier Level:  Tier 2  Status:  NA   Care Coordinator:      See Letters for [facility] Care Plan           ACP (advance care planning) 03/21/2012     Priority: Medium     Advance Care Planning 4/25/2016: ACP Review of Chart / Resources Provided:  Reviewed chart for advance care plan.  Priscilla Linda has no plan or code status on file however states presence of ACP document. Copy requested. Confirmed code status reflects current choices pending receipt of document/advance care plan review.  Confirmed/documented legally designated decision makers.  Added by Gifty Aguiar                 Essential hypertension, benign 03/21/2012     Priority: Medium     Asthma, mild persistent 02/12/2010     Priority: Medium     Mixed hyperlipidemia 02/12/2010     Priority: Medium     Ocular migraine 02/12/2010     Priority: Medium     Problem list name updated by automated process. Provider to review       Herpes simplex virus (HSV) infection 02/12/2010     Priority: Medium     Problem list name updated by automated process. 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    Hx of Clomid       Past Surgical History:   Procedure Laterality Date     C NONSPECIFIC PROCEDURE  1999    ganglion cyst     C TOTAL ABDOM  HYSTERECTOMY  1995    prolapse, marshal chay/ BSO     CATARACT IOL, RT/LT  2007    Right and Left     COLONOSCOPY  6/2013    polyps, repeat in 3 years     COLONOSCOPY  8/2016    tub adenoma/ repeat 5 years     GI SURGERY      EGD     HC COLONOSCOPY THRU STOMA, DIAGNOSTIC  2003,     hx of iron defic/ two normal colonoscopies     TUBAL LIGATION  1983     UPPER GI ENDOSCOPY  2003    hiatal hernia     Current Outpatient Prescriptions   Medication Sig Dispense Refill     doxycycline (VIBRA-TABS) 100 MG tablet Take 0.5 tablets (50 mg) by mouth daily 20 tablet 0     acyclovir (ZOVIRAX) 800 MG tablet For outbreaks 24 tablet 1     triamterene-hydrochlorothiazide (MAXZIDE-25) 37.5-25 MG per tablet Take 1 tablet by mouth daily 90 tablet 1     atorvastatin (LIPITOR) 20 MG tablet Take 1 tablet (20 mg) by mouth daily 90 tablet 3     ranitidine (ZANTAC) 300 MG tablet Take 1 tablet (300 mg) by mouth At Bedtime 90 tablet 3     albuterol (ALBUTEROL) 108 (90 BASE) MCG/ACT Inhaler Inhale 1-2 puffs into the lungs every 4 hours as needed 2 Inhaler 1     budesonide (PULMICORT FLEXHALER) 180 MCG/ACT inhaler Inhale 1 puff into the lungs 2 times daily 3 Inhaler 3     ASPIRIN NOT PRESCRIBED (INTENTIONAL) continuous prn for other Antiplatelet medication not prescribed intentionally due to Refusal by patient and Not indicated based on age       Lutein 10 MG TABS        MULTI-VITAMIN/IRON OR TABS 1 TABLET DAILY       metroNIDAZOLE (METROCREAM) 0.75 % cream        OTC products: no recent use of OTC ASA, NSAIDS or Steroids  Stopped lutein and multiple vitamin a week ago    No Known Allergies   Latex Allergy: NO    Social History   Substance Use Topics     Smoking status: Never Smoker     Smokeless tobacco: Never Used     Alcohol use 0.0 oz/week     0 Standard drinks or equivalent per week      Comment: very occ     History   Drug Use No       REVIEW OF SYSTEMS:                                                    C: NEGATIVE for fever, chills,  "change in weight  E/M: NEGATIVE for ear, mouth and throat problems  R: NEGATIVE for significant cough or SOB  CV: NEGATIVE for chest pain, palpitations or peripheral edema    EXAM:                                                    /78 (BP Location: Right arm, Patient Position: Chair, Cuff Size: Adult Regular)  Pulse 72  Temp 98.2  F (36.8  C) (Oral)  Resp 20  Ht 1.588 m (5' 2.5\")  Wt 75.3 kg (166 lb)  BMI 29.88 kg/m2  GENERAL APPEARANCE: healthy, alert and no distress  HENT: ear canals and TM's normal and nose and mouth without ulcers or lesions  RESP: lungs clear to auscultation - no rales, rhonchi or wheezes  CV: regular rate and rhythm, normal S1 S2, no S3 or S4 and no murmur, click or rub   ABDOMEN: soft, nontender, no HSM or masses and bowel sounds normal  Extremity: soft tissue mass volar surface of left wrist  NEURO: Normal strength and tone, sensory exam grossly normal, mentation intact and speech normal    DIAGNOSTICS:                                                    EKG: Normal Sinus Rhythm, Right Bundle Branch Block, unchanged from previous tracings    Hemoglobin   Date Value Ref Range Status   12/21/2017 16.2 (A) 11.7 - 15.7 g/dL Final   ]  Potassium is pending    Recent Labs   Lab Test  12/21/17   1213  07/06/17   0925  07/06/17   0910   01/11/17   0824   HGB  16.2*   --   16.6*   < >   --    PLT  353   --   230   < >   --    NA   --   141   --    --   141   POTASSIUM   --   3.9   --    --   4.8   CR   --   0.88   --    --   0.89    < > = values in this interval not displayed.        IMPRESSION:                                                    Reason for surgery/procedure: left wrist mass    The proposed surgical procedure is considered LOW risk.    REVISED CARDIAC RISK INDEX  The patient has the following serious cardiovascular risks for perioperative complications such as (MI, PE, VFib and 3  AV Block):  No serious cardiac risks  INTERPRETATION: 0 risks: Class I (very low risk - 0.4% " complication rate)    The patient has the following additional risks for perioperative complications:  No identified additional risks      ICD-10-CM    1. Pre-op exam Z01.818 HEMOGRAM/PLATELET (BFP)     VENOUS COLLECTION     EKG 12-lead complete w/read - Clinics     BASIC METABOLIC PANEL (QUEST)   2. Mild persistent asthma without complication J45.30    3. Ganglion of wrist, left M67.432    4. Family history of hemochromatosis Z83.49 FERRITIN (QUEST)       RECOMMENDATIONS:                                                        --Patient is to take all scheduled medications on the day of surgery EXCEPT for modifications listed below.    APPROVAL GIVEN to proceed with proposed procedure, without further diagnostic evaluation       Signed Electronically by: Beatrice Benz MD    Copy of this evaluation report is provided to requesting physician.    [place] Preop Guidelines

## 2017-12-22 LAB
BUN SERPL-MCNC: 20 MG/DL (ref 7–25)
BUN/CREATININE RATIO: ABNORMAL (CALC) (ref 6–22)
CALCIUM SERPL-MCNC: 10.1 MG/DL (ref 8.6–10.4)
CHLORIDE SERPLBLD-SCNC: 101 MMOL/L (ref 98–110)
CO2 SERPL-SCNC: 25 MMOL/L (ref 20–31)
CREAT SERPL-MCNC: 0.99 MG/DL (ref 0.5–0.99)
EGFR AFRICAN AMERICAN - QUEST: 69 ML/MIN/1.73M2
FERRITIN SERPL-MCNC: 48 NG/ML (ref 20–288)
GFR SERPL CREATININE-BSD FRML MDRD: 59 ML/MIN/1.73M2
GLUCOSE - QUEST: 95 MG/DL (ref 65–99)
POTASSIUM SERPL-SCNC: 4.3 MMOL/L (ref 3.5–5.3)
SODIUM SERPL-SCNC: 138 MMOL/L (ref 135–146)

## 2017-12-22 ASSESSMENT — ASTHMA QUESTIONNAIRES: ACT_TOTALSCORE: 25

## 2018-01-02 ENCOUNTER — OFFICE VISIT (OUTPATIENT)
Dept: FAMILY MEDICINE | Facility: CLINIC | Age: 68
End: 2018-01-02

## 2018-01-02 VITALS
OXYGEN SATURATION: 98 % | TEMPERATURE: 98.7 F | HEART RATE: 104 BPM | RESPIRATION RATE: 21 BRPM | BODY MASS INDEX: 29.38 KG/M2 | DIASTOLIC BLOOD PRESSURE: 92 MMHG | SYSTOLIC BLOOD PRESSURE: 128 MMHG | WEIGHT: 165.8 LBS | HEIGHT: 63 IN

## 2018-01-02 DIAGNOSIS — R05.9 COUGH: Primary | ICD-10-CM

## 2018-01-02 PROCEDURE — 99213 OFFICE O/P EST LOW 20 MIN: CPT | Performed by: FAMILY MEDICINE

## 2018-01-02 RX ORDER — PREDNISONE 20 MG/1
60 TABLET ORAL DAILY
Qty: 15 TABLET | Refills: 0 | Status: SHIPPED | OUTPATIENT
Start: 2018-01-02 | End: 2018-02-07

## 2018-01-02 RX ORDER — AZITHROMYCIN 250 MG/1
TABLET, FILM COATED ORAL
Qty: 6 TABLET | Refills: 0 | Status: SHIPPED | OUTPATIENT
Start: 2018-01-02 | End: 2018-02-07

## 2018-01-02 NOTE — MR AVS SNAPSHOT
"              After Visit Summary   1/2/2018    Priscilla Linda    MRN: [id number]           Patient Information     Date Of Birth          1950        Visit Information        Provider Department      1/2/2018 9:30 AM Emanuel Riggs MD [facility]        Today's Diagnoses     Cough    -  1       Follow-ups after your visit        Who to contact     If you have questions or need follow up information about today's clinic visit or your schedule please contact [facility] directly at [phone].  Normal or non-critical lab and imaging results will be communicated to you by Beautylishhart, letter or phone within 4 business days after the clinic has received the results. If you do not hear from us within 7 days, please contact the clinic through [employer] or phone. If you have a critical or abnormal lab result, we will notify you by phone as soon as possible.  Submit refill requests through [employer] or call your pharmacy and they will forward the refill request to us. Please allow 3 business days for your refill to be completed.          Additional Information About Your Visit        MyChart Information     [employer] gives you secure access to your electronic health record. If you see a primary care provider, you can also send messages to your care team and make appointments. If you have questions, please call your primary care clinic.  If you do not have a primary care provider, please call [phone] and they will assist you.        Care EveryWhere ID     This is your Care EveryWhere ID. This could be used by other organizations to access your [facility] medical records  [id number]        Your Vitals Were     Pulse Temperature Respirations Height Last Period Pulse Oximetry    104 98.7  F (37.1  C) (Oral) 21 1.588 m (5' 2.5\") (LMP Unknown) 98%    Breastfeeding? BMI (Body Mass Index)                No 29.84 kg/m2           Blood Pressure from Last 3 Encounters: "   01/02/18 (!) 128/92   12/21/17 112/78   07/06/17 136/86    Weight from Last 3 Encounters:   01/02/18 75.2 kg (165 lb 12.8 oz)   12/21/17 75.3 kg (166 lb)   07/06/17 76.9 kg (169 lb 9.6 oz)              Today, you had the following     No orders found for display         Today's Medication Changes          These changes are accurate as of: 1/2/18  9:56 AM.  If you have any questions, ask your nurse or doctor.               Start taking these medicines.        Dose/Directions    azithromycin 250 MG tablet   Commonly known as:  ZITHROMAX   Used for:  Cough   Started by:  Emanuel Riggs MD        Two tablets first day, then one tablet daily for four days.   Quantity:  6 tablet   Refills:  0       predniSONE 20 MG tablet   Commonly known as:  DELTASONE   Used for:  Cough   Started by:  Emanuel Riggs MD        Dose:  60 mg   Take 3 tablets (60 mg) by mouth daily   Quantity:  15 tablet   Refills:  0            Where to get your medicines      These medications were sent to [id number] - [place], [place] - [address] [address]   [address] [address] , [facility] [address]     Phone:  [phone]     azithromycin 250 MG tablet    predniSONE 20 MG tablet                Primary Care Provider Office Phone # Fax #    Beatrice Benz [phone] [address] [address]        Equal Access to Services     [facility] AH: Hadii kilo ku hadasho Souteali, waaxda luqadaha, qaybta kaalmada adeegyada, juan antonio camarena . So [facility] [phone].    ATENCIÓN: Si habla español, tiene a petty disposición servicios gratuitos de asistencia lingüística. Llame al [phone].    We comply with applicable federal civil rights laws and [place] laws. We do not discriminate on the basis of race, color, national origin, age, disability, sex, sexual orientation, or gender identity.            Thank you!     Thank you for choosing [facility]  for your care. Our goal is always to provide you with excellent care. Hearing back from our patients is one way we can continue to improve our services. Please take a few minutes to complete the written survey that you may receive in the mail after your visit with us. Thank you!             Your Updated Medication List - Protect others around you: Learn how to safely use, store and throw away your medicines at www.disposemymeds.org.          This list is accurate as of: 1/2/18  9:56 AM.  Always use your most recent med list.                   Brand Name Dispense Instructions for use Diagnosis    acyclovir 800 MG tablet    ZOVIRAX    24 tablet    For outbreaks    Herpes simplex virus (HSV) infection       albuterol 108 (90 BASE) MCG/ACT Inhaler    PROAIR HFA    2 Inhaler    Inhale 1-2 puffs into the lungs every 4 hours as needed    Mild persistent asthma without complication       ASPIRIN NOT PRESCRIBED    INTENTIONAL     continuous prn for other Antiplatelet medication not prescribed intentionally due to Refusal by patient and Not indicated based on age        atorvastatin 20 MG tablet    LIPITOR    90 tablet    Take 1 tablet (20 mg) by mouth daily    Mixed hyperlipidemia       azithromycin 250 MG tablet    ZITHROMAX    6 tablet    Two tablets first day, then one tablet daily for four days.    Cough       budesonide 180 MCG/ACT inhaler    PULMICORT FLEXHALER    3 Inhaler    Inhale 1 puff into the lungs 2 times daily    Mild persistent asthma without complication       doxycycline 100 MG tablet    VIBRA-TABS    20 tablet    Take 0.5 tablets (50 mg) by mouth daily        Lutein 10 MG Tabs           metroNIDAZOLE 0.75 % cream    METROCREAM          MULTI-vitamin  /IRON Tabs      1 TABLET DAILY        predniSONE 20 MG tablet    DELTASONE    15 tablet    Take 3 tablets (60 mg) by mouth daily    Cough       ranitidine 300 MG tablet    ZANTAC    90 tablet    Take 1 tablet (300 mg) by mouth At Bedtime     Gastroesophageal reflux disease, esophagitis presence not specified       triamterene-hydrochlorothiazide 37.5-25 MG per tablet    MAXZIDE-25    90 tablet    Take 1 tablet by mouth daily    Essential hypertension, benign

## 2018-01-02 NOTE — NURSING NOTE
"Priscilla Linda is here today for a cough and SOB x6 days.    Pre-visit Planning:    Immunizations -up to date  Colonoscopy -is up to date  Mammogram -is up to date  Asthma test --is up to date  PHQ9 -NA  JEFFY 7 -NA      Vitals:    BP Cuff right  Arm with large Cuff  PULSE regular  165 lbs 12.8 oz and 5' 2.5\"  CLASSIFICATION OF OVERWEIGHT AND OBESITY BY BMI                        Obesity Class           BMI(kg/m2)  Underweight                                    < 18.5  Normal                                         18.5-24.9  Overweight                                     25.0-29.9  OBESITY                     I                  30.0-34.9                             II                 35.0-39.9  EXTREME OBESITY             III                >40      Patient's  BMI Body mass index is 29.84 kg/(m^2).  Http://hin.nhlbi.nih.gov/menuplanner/menu.cgi    My Chart: - accepts     Tobacco Use: Questioned patient about current smoking habits.  Pt. has never smoked.    The patient has verbalized that it is ok to leave a detailed voice message on the patient's home voicemail with results/recommendations from this visit.     Verified Phone Number: [phone]    Sepideh Rendon CMA    "

## 2018-01-02 NOTE — PROGRESS NOTES
SUBJECTIVE:   Priscilla Linda is a 67 year old female who complains of cough, chest congestion, wheezing and shortness of breath for 5-6 days. She denies a history of sweats, chills, vomiting and chest pain and admits to a history of asthma. Patient does not smoke cigarettes.     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     Social History Main Topics     Smoking status: Never Smoker     Smokeless tobacco: Never Used     Alcohol use 0.0 oz/week     0 Standard drinks or equivalent per week      Comment: very occ     Drug use: No     Sexual activity: Not Currently     Other Topics Concern      Service No     Blood Transfusions No     Caffeine Concern No     Occupational Exposure No     Hobby Hazards No     Weight Concern Yes     Special Diet Yes     low cholesterol     Exercise Yes     Seat Belt Yes     Social History Narrative     Past Surgical History:   Procedure Laterality Date     C NONSPECIFIC PROCEDURE      ganglion cyst     C TOTAL ABDOM HYSTERECTOMY      prolapse, ro cartwright/ JUSTYN      CATARACT IOL, RT/LT  2007    Right and Left     COLONOSCOPY  6/2013    polyps, repeat in 3 years     COLONOSCOPY  8/2016    tub adenoma/ repeat 5 years     HC COLONOSCOPY THRU STOMA, DIAGNOSTIC  2003,     hx of iron defic/ two normal colonoscopies     TUBAL LIGATION  1983     UPPER GI ENDOSCOPY  2003    hiatal hernia       Current Outpatient Prescriptions on File Prior to Visit:  triamterene-hydrochlorothiazide (MAXZIDE-25) 37.5-25 MG per tablet Take 1 tablet by mouth daily   atorvastatin (LIPITOR) 20 MG tablet Take 1 tablet (20 mg) by mouth daily   ranitidine (ZANTAC) 300 MG tablet Take 1 tablet (300 mg) by mouth At Bedtime   albuterol (ALBUTEROL) 108 (90 BASE) MCG/ACT Inhaler Inhale 1-2 puffs into the lungs every 4 hours as needed   budesonide (PULMICORT FLEXHALER) 180 MCG/ACT inhaler Inhale 1 puff into the lungs 2 times daily   Lutein 10 MG TABS    MULTI-VITAMIN/IRON OR TABS 1 TABLET DAILY   doxycycline (VIBRA-TABS) 100 MG tablet Take 0.5 tablets (50 mg) by mouth daily   metroNIDAZOLE (METROCREAM) 0.75 % cream    acyclovir (ZOVIRAX) 800 MG tablet For outbreaks   ASPIRIN NOT PRESCRIBED (INTENTIONAL) continuous prn for other Antiplatelet medication not prescribed intentionally due to Refusal by patient and Not indicated based on age     No current facility-administered medications on file prior to visit.      Allergies: Review of patient's allergies indicates no known allergies.    Immunization History   Administered Date(s) Administered     HEPA 02/11/2010, 08/27/2010     Influenza (H1N1) 11/11/2009     Influenza (High Dose) 3 valent vaccine 09/19/2017     Influenza (IIV3) PF 10/05/2009, 10/28/2010, 09/30/2011, 10/01/2012, 10/09/2013     Influenza Intranasal Vaccine 4 valent 10/02/2015, 09/27/2016     Influenza Vaccine IM 3yrs+ 4 Valent IIV4 10/31/2014     Pneumo Conj 13-V (2010&after) 09/27/2016     Pneumococcal 23 valent 10/10/2013     TDAP Vaccine (Boostrix) 08/27/2010     Zoster vaccine, live 03/21/2012          OBJECTIVE:  She appears well, vital signs are as noted by the nurse. Ears normal.  Throat and pharynx normal.  Neck supple. No adenopathy in the neck. Nose is congested. Sinuses non tender. The chest is clear, without wheezes or rales.    ASSESSMENT:   Cough, bronchospasm-likely viral illness but has dental surgery in 6 days- Discussed viral vs. bacterial infections and need to avoid unnecessary prescribing of antibiotics to ensure that no resistance will develop. Will give prescription for antibiotic (see orders) to fill given upcoming surgery     PLAN:steroid burst, Zpack, I reviewed the risks, benefits, and possible side effects of the medication.  The patient had an opportunity to ask any questions regarding the treatment plan. The patient was encouraged to call my office if any problems.   Symptomatic therapy suggested: push fluids, rest and use cough suppressant of choice as needed. Call or return to clinic prn if these symptoms worsen or fail to improve as anticipated.

## 2018-01-04 ENCOUNTER — TRANSFERRED RECORDS (OUTPATIENT)
Dept: FAMILY MEDICINE | Facility: CLINIC | Age: 68
End: 2018-01-04

## 2018-02-07 ENCOUNTER — OFFICE VISIT (OUTPATIENT)
Dept: FAMILY MEDICINE | Facility: CLINIC | Age: 68
End: 2018-02-07

## 2018-02-07 VITALS
HEART RATE: 68 BPM | DIASTOLIC BLOOD PRESSURE: 80 MMHG | RESPIRATION RATE: 20 BRPM | SYSTOLIC BLOOD PRESSURE: 128 MMHG | TEMPERATURE: 97.7 F | BODY MASS INDEX: 28.99 KG/M2 | WEIGHT: 163.6 LBS | HEIGHT: 63 IN

## 2018-02-07 DIAGNOSIS — G43.109 OCULAR MIGRAINE: ICD-10-CM

## 2018-02-07 DIAGNOSIS — J45.30 MILD PERSISTENT ASTHMA WITHOUT COMPLICATION: ICD-10-CM

## 2018-02-07 DIAGNOSIS — I10 ESSENTIAL HYPERTENSION, BENIGN: ICD-10-CM

## 2018-02-07 PROCEDURE — 99213 OFFICE O/P EST LOW 20 MIN: CPT | Performed by: FAMILY MEDICINE

## 2018-02-07 RX ORDER — ALBUTEROL SULFATE 90 UG/1
1-2 AEROSOL, METERED RESPIRATORY (INHALATION) EVERY 4 HOURS PRN
Qty: 1 INHALER | Refills: 1 | Status: SHIPPED | OUTPATIENT
Start: 2018-02-07 | End: 2020-01-30

## 2018-02-07 RX ORDER — TRIAMTERENE/HYDROCHLOROTHIAZID 37.5-25 MG
1 TABLET ORAL DAILY
Qty: 90 TABLET | Refills: 1 | Status: SHIPPED | OUTPATIENT
Start: 2018-02-07 | End: 2018-07-11

## 2018-02-07 NOTE — LETTER
My Asthma Action Plan  Name: Priscilla Linda   YOB: 1950  Date: 2/7/2018   My doctor: Beatrice Benz MD   My clinic: [facility]        My Control Medicine: Budesonide (Pulmicort Flexhaler) -  180 mcg one puff twice a day  My Rescue Medicine: Albuterol (Proair/Ventolin/Proventil) inhaler 2 puffs every four hours as needed   My Asthma Severity: mild persistent  Avoid your asthma triggers: upper respiratory infections   Air quality               GREEN ZONE   Good Control    I feel good    No cough or wheeze    Can work, sleep and play without asthma symptoms       Take your asthma control medicine every day.     1. If exercise triggers your asthma, take your rescue medication    15 minutes before exercise or sports, and    During exercise if you have asthma symptoms  2. Spacer to use with inhaler: If you have a spacer, make sure to use it with your inhaler             YELLOW ZONE Getting Worse  I have ANY of these:    I do not feel good    Cough or wheeze    Chest feels tight    Wake up at night   1. Keep taking your Green Zone medications  2. Start taking your rescue medicine:    every 20 minutes for up to 1 hour. Then every 4 hours for 24-48 hours.  3. If you stay in the Yellow Zone for more than 12-24 hours, contact your doctor.  4. If you do not return to the Green Zone in 12-24 hours or you get worse, start taking your oral steroid medicine if prescribed by your provider.           RED ZONE Medical Alert - Get Help  I have ANY of these:    I feel awful    Medicine is not helping    Breathing getting harder    Trouble walking or talking    Nose opens wide to breathe       1. Take your rescue medicine NOW  2. If your provider has prescribed an oral steroid medicine, start taking it NOW  3. Call your doctor NOW  4. If you are still in the Red Zone after 20 minutes and you have not reached your doctor:    Take your rescue medicine again and    Call 911 or go to the  emergency room right away    See your regular doctor within 2 weeks of an Emergency Room or Urgent Care visit for follow-up treatment.        Electronically signed by: Beatrice Benz, February 7, 2018    Annual Reminders:  Meet with Asthma Educator,  Flu Shot in the Fall, consider Pneumonia Vaccination for patients with asthma (aged 19 and older).    Pharmacy: [id number] [place], [place] - [address]                    Asthma Triggers  How To Control Things That Make Your Asthma Worse    Triggers are things that make your asthma worse.  Look at the list below to help you find your triggers and what you can do about them.  You can help prevent asthma flare-ups by staying away from your triggers.      Trigger                                                          What you can do   Cigarette Smoke  Tobacco smoke can make asthma worse. Do not allow smoking in your home, car or around you.  Be sure no one smokes at a child s day care or school.  If you smoke, ask your health care provider for ways to help you quit.  Ask family members to quit too.  Ask your health care provider for a referral to Quit Plan to help you quit smoking, or call 9-928-925-PLAN.     Colds, Flu, Bronchitis  These are common triggers of asthma. Wash your hands often.  Don t touch your eyes, nose or mouth.  Get a flu shot every year.     Dust Mites  These are tiny bugs that live in cloth or carpet. They are too small to see. Wash sheets and blankets in hot water every week.   Encase pillows and mattress in dust mite proof covers.  Avoid having carpet if you can. If you have carpet, vacuum weekly.   Use a dust mask and HEPA vacuum.   Pollen and Outdoor Mold  Some people are allergic to trees, grass, or weed pollen, or molds. Try to keep your windows closed.  Limit time out doors when pollen count is high.   Ask you health care provider about taking medicine during allergy season.     Animal Dander  Some people are allergic  to skin flakes, urine or saliva from pets with fur or feathers. Keep pets with fur or feathers out of your home.    If you can t keep the pet outdoors, then keep the pet out of your bedroom.  Keep the bedroom door closed.  Keep pets off cloth furniture and away from stuffed toys.     Mice, Rats, and Cockroaches  Some people are allergic to the waste from these pests.   Cover food and garbage.  Clean up spills and food crumbs.  Store grease in the refrigerator.   Keep food out of the bedroom.   Indoor Mold  This can be a trigger if your home has high moisture. Fix leaking faucets, pipes, or other sources of water.   Clean moldy surfaces.  Dehumidify basement if it is damp and smelly.   Smoke, Strong Odors, and Sprays  These can reduce air quality. Stay away from strong odors and sprays, such as perfume, powder, hair spray, paints, smoke incense, paint, cleaning products, candles and new carpet.   Exercise or Sports  Some people with asthma have this trigger. Be active!  Ask your doctor about taking medicine before sports or exercise to prevent symptoms.    Warm up for 5-10 minutes before and after sports or exercise.     Other Triggers of Asthma  Cold air:  Cover your nose and mouth with a scarf.  Sometimes laughing or crying can be a trigger.  Some medicines and food can trigger asthma.

## 2018-02-07 NOTE — PROGRESS NOTES
SUBJECTIVE:   Priscilla Linda is a 67 year old female who presents to clinic today for the following health issues:    Hypertension Follow-up    The patient is currently taking triamterene-hctz as prescribed. She has no problems taking the medication and feels it's working well.     Outpatient blood pressures are not being checked.    Low Salt Diet: no added salt    Asthma Follow-Up    Was ACT completed today?  No      Respiratory symptoms:   Cough: No   Wheezing: No   Shortness of breath: No    Use of short- acting(rescue) inhaler: Only with flares    Taking controlled (daily) meds as prescribed: Yes    ER/UC visits or hospital admissions since last visit: Yes, office visit- see below, following surgery in January.    Recent asthma triggers that patient is dealing with: None     History of albuterol use when air quality is poor in the summer       Amount of exercise or physical activity: 2-3 days/week for an average of 30-45 minutes    Problems taking medications regularly: No    Medication side effects: none    Diet: regular (no restrictions)    The patient had a ganglion cyst removed in December 2017. After the procedure she had an asthma exacerbation vs bronchitis. She presented to see Dr. Riggs and was placed on 60 mg of prednisone with an antibiotic.Prior to that episode,    ADDITIONAL PROBLEMS TO ADD ON...  The patient states her occular migraines tend to occur once every few months. She is not taking any medications for this and treats them with ice/ibuprofen.       Problem list and histories reviewed & adjusted, as indicated.  Additional history: as documented    Patient Active Problem List   Diagnosis     Asthma, mild persistent     Mixed hyperlipidemia     Ocular migraine     Herpes simplex virus (HSV) infection     ACP (advance care planning)     Essential hypertension, benign     Health Care Home     Past Surgical History:   Procedure Laterality Date     C NONSPECIFIC PROCEDURE  1999    ganglion  cyst     C TOTAL ABDOM HYSTERECTOMY      prolapse, marshal chay/ BSO     CATARACT IOL, RT/LT      Right and Left     COLONOSCOPY  2013    polyps, repeat in 3 years     COLONOSCOPY  2016    tub adenoma/ repeat 5 years     HC COLONOSCOPY THRU STOMA, DIAGNOSTIC  ,     hx of iron defic/ two normal colonoscopies     TUBAL LIGATION       UPPER GI ENDOSCOPY      hiatal hernia       Social History   Substance Use Topics     Smoking status: Never Smoker     Smokeless tobacco: Never Used     Alcohol use 0.0 oz/week     0 Standard drinks or equivalent per week      Comment: very occ     Family History   Problem Relation Age of Onset     DIABETES Father       age 75 complications     Hypertension Mother      mild     DIABETES Sister      type 1     DIABETES Brother      aodm in his forties     Blood Disease No family hx of      family hx of hemochromatosis         Current Outpatient Prescriptions   Medication Sig Dispense Refill     metroNIDAZOLE (METROCREAM) 0.75 % cream        acyclovir (ZOVIRAX) 800 MG tablet For outbreaks 24 tablet 1     triamterene-hydrochlorothiazide (MAXZIDE-25) 37.5-25 MG per tablet Take 1 tablet by mouth daily 90 tablet 1     atorvastatin (LIPITOR) 20 MG tablet Take 1 tablet (20 mg) by mouth daily 90 tablet 3     ranitidine (ZANTAC) 300 MG tablet Take 1 tablet (300 mg) by mouth At Bedtime 90 tablet 3     albuterol (ALBUTEROL) 108 (90 BASE) MCG/ACT Inhaler Inhale 1-2 puffs into the lungs every 4 hours as needed 2 Inhaler 1     budesonide (PULMICORT FLEXHALER) 180 MCG/ACT inhaler Inhale 1 puff into the lungs 2 times daily 3 Inhaler 3     ASPIRIN NOT PRESCRIBED (INTENTIONAL) continuous prn for other Antiplatelet medication not prescribed intentionally due to Refusal by patient and Not indicated based on age       Lutein 10 MG TABS        MULTI-VITAMIN/IRON OR TABS 1 TABLET DAILY         Reviewed and updated as needed this visit by clinical staff  Tobacco  Allergies  Meds  " Problems       Reviewed and updated as needed this visit by Provider         ROS:   ROS: 10 point ROS neg other than the symptoms noted above in the HPI.    OBJECTIVE:     /80 (BP Location: Right arm, Patient Position: Chair, Cuff Size: Adult Regular)  Pulse 68  Temp 97.7  F (36.5  C) (Oral)  Resp 20  Ht 1.588 m (5' 2.5\")  Wt 74.2 kg (163 lb 9.6 oz)  LMP  (LMP Unknown)  BMI 29.45 kg/m2  Body mass index is 29.45 kg/(m^2).   Constitutional: Alert, oriented, well hydrated. Skin turgor normal.  Cardiovascular: Regular rate and  rhythm. S1 and S2 normal, no murmurs, clicks, gallops or rubs. No edema or JVD.   Pulmonary: Chest wall normal to inspection and palpation. Good excursion bilaterally. Lungs clear to auscultation. Good air movement bilaterally without rales, wheezes, or rhonchi.      Diagnostic Test Results:  none     ASSESSMENT/PLAN:     Problem List Items Addressed This Visit     Asthma, mild persistent    Ocular migraine - Primary    Essential hypertension, benign         (G43.109) Ocular migraine  (primary encounter diagnosis)  Comment/plan: Stable. Continue current treatment plan, ice and ibuprofen with episodes.       (I10) Essential hypertension, benign  Comment: Well controlled. No indication for changes in management. Refill given. Last BMP normal in 12/2017.  Plan: triamterene-hydrochlorothiazide (MAXZIDE-25)         37.5-25 MG per tablet            (J45.30) Mild persistent asthma without complication  Comment: Well controlled. Will continue current treatment. Refills given.   Plan: albuterol (PROAIR HFA) 108 (90 BASE) MCG/ACT         Inhaler, budesonide (PULMICORT FLEXHALER) 180         MCG/ACT inhaler         Scribe Statement: I, Naeem Caicedo, PSS, am scribing for and in the presence of Beatrice Benz MD. 2/7/2018 7:08 AM    Provider Statement: I personally performed this service and the scribe documentation above accurately reflects this service. Beatrice Benz MD 2/7/2018 " 7:08 AM    Beatrice Benz MD  [facility]

## 2018-02-07 NOTE — MR AVS SNAPSHOT
"              After Visit Summary   2/7/2018    Priscilla Linda    MRN: [id number]           Patient Information     Date Of Birth          1950        Visit Information        Provider Department      2/7/2018 8:00 AM Beatrice Benz MD [facility]        Today's Diagnoses     Essential hypertension, benign        Mild persistent asthma without complication        Ocular migraine           Follow-ups after your visit        Who to contact     If you have questions or need follow up information about today's clinic visit or your schedule please contact [facility] directly at [phone].  Normal or non-critical lab and imaging results will be communicated to you by MyChart, letter or phone within 4 business days after the clinic has received the results. If you do not hear from us within 7 days, please contact the clinic through [employer]hart or phone. If you have a critical or abnormal lab result, we will notify you by phone as soon as possible.  Submit refill requests through [employer] or call your pharmacy and they will forward the refill request to us. Please allow 3 business days for your refill to be completed.          Additional Information About Your Visit        MyChart Information     [employer] gives you secure access to your electronic health record. If you see a primary care provider, you can also send messages to your care team and make appointments. If you have questions, please call your primary care clinic.  If you do not have a primary care provider, please call [phone] and they will assist you.        Care EveryWhere ID     This is your Care EveryWhere ID. This could be used by other organizations to access your [place] medical records  [id number]        Your Vitals Were     Pulse Temperature Respirations Height Last Period BMI (Body Mass Index)    68 97.7  F (36.5  C) (Oral) 20 1.588 m (5' 2.5\") (LMP Unknown) 29.45 kg/m2       Blood " Pressure from Last 3 Encounters:   02/07/18 128/80   01/02/18 (!) 128/92   12/21/17 112/78    Weight from Last 3 Encounters:   02/07/18 74.2 kg (163 lb 9.6 oz)   01/02/18 75.2 kg (165 lb 12.8 oz)   12/21/17 75.3 kg (166 lb)              We Performed the Following     Asthma Action Plan (AAP)          Where to get your medicines      These medications were sent to [facility] # [id number] - [place], [place] - [address] [address]  [address], [facility] [address]     Phone:  [phone]     albuterol 108 (90 BASE) MCG/ACT Inhaler    budesonide 180 MCG/ACT inhaler    triamterene-hydrochlorothiazide 37.5-25 MG per tablet          Primary Care Provider Office Phone # Fax #    Beatrice Benz [phone] [address] [facility] [address]  [facility] [address]        Equal Access to Services     [facility]: Hadii kilo ku hadasho Soomaali, waaxda luqadaha, qaybta kaalmada adeegyada, waxay idiin hayaurora camarena . So [facility] [phone].    ATENCIÓN: Si habla español, tiene a petty disposición servicios gratuitos de asistencia lingüística. [facility] [phone].    We comply with applicable federal civil rights laws and [place] laws. We do not discriminate on the basis of race, color, national origin, age, disability, sex, sexual orientation, or gender identity.            Thank you!     Thank you for choosing [facility]  for your care. Our goal is always to provide you with excellent care. Hearing back from our patients is one way we can continue to improve our services. Please take a few minutes to complete the written survey that you may receive in the mail after your visit with us. Thank you!             Your Updated Medication List - Protect others around you: Learn how to safely use, store and throw away your medicines at www.disposemymeds.org.          This list is accurate as of 2/7/18  8:36 AM.  Always use your most recent med list.                   Brand  Name Dispense Instructions for use Diagnosis    acyclovir 800 MG tablet    ZOVIRAX    24 tablet    For outbreaks    Herpes simplex virus (HSV) infection       albuterol 108 (90 BASE) MCG/ACT Inhaler    PROAIR HFA    1 Inhaler    Inhale 1-2 puffs into the lungs every 4 hours as needed    Mild persistent asthma without complication       ASPIRIN NOT PRESCRIBED    INTENTIONAL     continuous prn for other Antiplatelet medication not prescribed intentionally due to Refusal by patient and Not indicated based on age        atorvastatin 20 MG tablet    LIPITOR    90 tablet    Take 1 tablet (20 mg) by mouth daily    Mixed hyperlipidemia       budesonide 180 MCG/ACT inhaler    PULMICORT FLEXHALER    2 Inhaler    Inhale 1 puff into the lungs 2 times daily    Mild persistent asthma without complication       Lutein 10 MG Tabs           metroNIDAZOLE 0.75 % cream    METROCREAM          MULTI-vitamin  /IRON Tabs      1 TABLET DAILY        ranitidine 300 MG tablet    ZANTAC    90 tablet    Take 1 tablet (300 mg) by mouth At Bedtime    Gastroesophageal reflux disease, esophagitis presence not specified       triamterene-hydrochlorothiazide 37.5-25 MG per tablet    MAXZIDE-25    90 tablet    Take 1 tablet by mouth daily    Essential hypertension, benign

## 2018-02-07 NOTE — NURSING NOTE
Questioned patient about current smoking habits.  Pt. has never smoked.  PULSE regular  My Chart: activew  CLASSIFICATION OF OVERWEIGHT AND OBESITY BY BMI                        Obesity Class           BMI(kg/m2)  Underweight                                    < 18.5  Normal                                         18.5-24.9  Overweight                                     25.0-29.9  OBESITY                     I                  30.0-34.9                             II                 35.0-39.9  EXTREME OBESITY             III                >40                            Patient's  BMI Body mass index is 29.45 kg/(m^2).  http://hin.nhlbi.nih.gov/menuplanner/menu.cgi  Pre-visit planning  Immunizations - up to date  Colonoscopy - is up to date  Mammogram - is up to date  Asthma - completed today  PHQ9 -    JEFFY-7 -

## 2018-02-08 ASSESSMENT — ASTHMA QUESTIONNAIRES: ACT_TOTALSCORE: 24

## 2018-02-19 ENCOUNTER — TRANSFERRED RECORDS (OUTPATIENT)
Dept: FAMILY MEDICINE | Facility: CLINIC | Age: 68
End: 2018-02-19

## 2018-03-12 ENCOUNTER — OFFICE VISIT (OUTPATIENT)
Dept: FAMILY MEDICINE | Facility: CLINIC | Age: 68
End: 2018-03-12

## 2018-03-12 VITALS
RESPIRATION RATE: 20 BRPM | HEIGHT: 63 IN | DIASTOLIC BLOOD PRESSURE: 80 MMHG | TEMPERATURE: 100.5 F | BODY MASS INDEX: 28.88 KG/M2 | HEART RATE: 88 BPM | WEIGHT: 163 LBS | SYSTOLIC BLOOD PRESSURE: 138 MMHG

## 2018-03-12 DIAGNOSIS — R05.9 COUGH: Primary | ICD-10-CM

## 2018-03-12 PROCEDURE — 99213 OFFICE O/P EST LOW 20 MIN: CPT | Performed by: FAMILY MEDICINE

## 2018-03-12 RX ORDER — AZITHROMYCIN 250 MG/1
TABLET, FILM COATED ORAL
Qty: 6 TABLET | Refills: 0 | Status: SHIPPED | OUTPATIENT
Start: 2018-03-12 | End: 2018-07-12

## 2018-03-12 NOTE — MR AVS SNAPSHOT
"              After Visit Summary   3/12/2018    Priscilla Linda    MRN: [id number]           Patient Information     Date Of Birth          1950        Visit Information        Provider Department      3/12/2018 3:45 PM Beatrice Benz MD [facility]        Today's Diagnoses     Cough    -  1       Follow-ups after your visit        Who to contact     If you have questions or need follow up information about today's clinic visit or your schedule please contact [facility] directly at [phone].  Normal or non-critical lab and imaging results will be communicated to you by [employer], letter or phone within 4 business days after the clinic has received the results. If you do not hear from us within 7 days, please contact the clinic through [employer] or phone. If you have a critical or abnormal lab result, we will notify you by phone as soon as possible.  Submit refill requests through [employer] or call your pharmacy and they will forward the refill request to us. Please allow 3 business days for your refill to be completed.          Additional Information About Your Visit        MyChart Information     [employer] gives you secure access to your electronic health record. If you see a primary care provider, you can also send messages to your care team and make appointments. If you have questions, please call your primary care clinic.  If you do not have a primary care provider, please call [phone] and they will assist you.        Care EveryWhere ID     This is your Care EveryWhere ID. This could be used by other organizations to access your [place] medical records  [id number]        Your Vitals Were     Pulse Temperature Respirations Height Last Period Breastfeeding?    88 100.5  F (38.1  C) (Oral) 20 1.588 m (5' 2.5\") (LMP Unknown) No    BMI (Body Mass Index)                   29.34 kg/m2            Blood Pressure from Last 3 Encounters:   03/12/18 138/80 "   02/07/18 128/80   01/02/18 (!) 128/92    Weight from Last 3 Encounters:   03/12/18 73.9 kg (163 lb)   02/07/18 74.2 kg (163 lb 9.6 oz)   01/02/18 75.2 kg (165 lb 12.8 oz)              Today, you had the following     No orders found for display         Today's Medication Changes          These changes are accurate as of 3/12/18 11:59 PM.  If you have any questions, ask your nurse or doctor.               Start taking these medicines.        Dose/Directions    azithromycin 250 MG tablet   Commonly known as:  ZITHROMAX   Used for:  Cough   Started by:  Beatrice Benz MD        Two tablets first day, then one tablet daily for four days.   Quantity:  6 tablet   Refills:  0            Where to get your medicines      These medications were sent to [facility] # [id number] - [place], [place] - [address]  [address] [facility] [address]     Phone:  [phone]     azithromycin 250 MG tablet                Primary Care Provider Office Phone # Fax #    Beatrice Benz [phone] [address] [address]        Equal Access to Services     [facility]: Hadii aad ku hadasho Soomaali, waaxda luqadaha, qaybta kaalmada adeegyada, juan antonio sanchez. So [facility] [phone].    ATENCIÓN: Si habla español, tiene a petty disposición servicios gratuitos de asistencia lingüística. Llame al [phone].    We comply with applicable federal civil rights laws and [place] laws. We do not discriminate on the basis of race, color, national origin, age, disability, sex, sexual orientation, or gender identity.            Thank you!     Thank you for choosing [facility]  for your care. Our goal is always to provide you with excellent care. Hearing back from our patients is one way we can continue to improve our services. Please take a few minutes to complete the written survey that you may receive in the mail after your visit  with us. Thank you!             Your Updated Medication List - Protect others around you: Learn how to safely use, store and throw away your medicines at www.disposemymeds.org.          This list is accurate as of 3/12/18 11:59 PM.  Always use your most recent med list.                   Brand Name Dispense Instructions for use Diagnosis    acyclovir 800 MG tablet    ZOVIRAX    24 tablet    For outbreaks    Herpes simplex virus (HSV) infection       albuterol 108 (90 BASE) MCG/ACT Inhaler    PROAIR HFA    1 Inhaler    Inhale 1-2 puffs into the lungs every 4 hours as needed    Mild persistent asthma without complication       ASPIRIN NOT PRESCRIBED    INTENTIONAL     continuous prn for other Antiplatelet medication not prescribed intentionally due to Refusal by patient and Not indicated based on age        atorvastatin 20 MG tablet    LIPITOR    90 tablet    Take 1 tablet (20 mg) by mouth daily    Mixed hyperlipidemia       azithromycin 250 MG tablet    ZITHROMAX    6 tablet    Two tablets first day, then one tablet daily for four days.    Cough       budesonide 180 MCG/ACT inhaler    PULMICORT FLEXHALER    2 Inhaler    Inhale 1 puff into the lungs 2 times daily    Mild persistent asthma without complication       Lutein 10 MG Tabs           metroNIDAZOLE 0.75 % cream    METROCREAM          MULTI-vitamin  /IRON Tabs      1 TABLET DAILY        ranitidine 300 MG tablet    ZANTAC    90 tablet    Take 1 tablet (300 mg) by mouth At Bedtime    Gastroesophageal reflux disease, esophagitis presence not specified       triamterene-hydrochlorothiazide 37.5-25 MG per tablet    MAXZIDE-25    90 tablet    Take 1 tablet by mouth daily    Essential hypertension, benign

## 2018-03-12 NOTE — NURSING NOTE
Priscilla Linda is here for a cough since Wednesday. Has had congestion, wheezing,chills and HA.    Questioned patient about current smoking habits.  Pt. has never smoked.  PULSE regular  My Chart: active  CLASSIFICATION OF OVERWEIGHT AND OBESITY BY BMI                        Obesity Class           BMI(kg/m2)  Underweight                                    < 18.5  Normal                                         18.5-24.9  Overweight                                     25.0-29.9  OBESITY                     I                  30.0-34.9                             II                 35.0-39.9  EXTREME OBESITY             III                >40                            Patient's  BMI Body mass index is 29.34 kg/(m^2).  http://hin.nhlbi.nih.gov/menuplanner/menu.cgi  Pre-visit planning  Immunizations - up to date  Colonoscopy - is up to date  Mammogram - is up to date  Asthma -   PHQ9 -    JEFFY-7 -

## 2018-03-12 NOTE — PROGRESS NOTES
"SUBJECTIVE:  67 year old female, who has a history of asthma, presents with the following concern:    Fifth day of coughing  Headache  Low grade fever  Mild shortness of breath    Leaving [place] for a wedding in three days with her elderly mother  Driving    Has been on a couple courses of antibiotics since January for oral surgery    Patient Active Problem List   Diagnosis     Asthma, mild persistent     Mixed hyperlipidemia     Ocular migraine     Herpes simplex virus (HSV) infection     ACP (advance care planning)     Essential hypertension, benign     Health Care Home     Past Surgical History:   Procedure Laterality Date     C NONSPECIFIC PROCEDURE  1999    ganglion cyst     C TOTAL ABDOM HYSTERECTOMY  1995    prolapse, marshal hcay/ BSO     CATARACT IOL, RT/LT  2007    Right and Left     COLONOSCOPY  6/2013    polyps, repeat in 3 years     COLONOSCOPY  8/2016    tub adenoma/ repeat 5 years     HC COLONOSCOPY THRU STOMA, DIAGNOSTIC  2003,     hx of iron defic/ two normal colonoscopies     TUBAL LIGATION  1983     UPPER GI ENDOSCOPY  2003    hiatal hernia     Current Outpatient Prescriptions   Medication     azithromycin (ZITHROMAX) 250 MG tablet     triamterene-hydrochlorothiazide (MAXZIDE-25) 37.5-25 MG per tablet     albuterol (PROAIR HFA) 108 (90 BASE) MCG/ACT Inhaler     budesonide (PULMICORT FLEXHALER) 180 MCG/ACT inhaler     metroNIDAZOLE (METROCREAM) 0.75 % cream     acyclovir (ZOVIRAX) 800 MG tablet     atorvastatin (LIPITOR) 20 MG tablet     ranitidine (ZANTAC) 300 MG tablet     ASPIRIN NOT PRESCRIBED (INTENTIONAL)     Lutein 10 MG TABS     MULTI-VITAMIN/IRON OR TABS     No current facility-administered medications for this visit.        /80 (BP Location: Left arm, Patient Position: Chair, Cuff Size: Adult Regular)  Pulse 88  Temp 100.5  F (38.1  C) (Oral)  Resp 20  Ht 1.588 m (5' 2.5\")  Wt 73.9 kg (163 lb)  LMP  (LMP Unknown)  Breastfeeding? No  BMI 29.34 kg/m2  No acute " distress  External ears  and canals clear bilaterally. TM's normal bilaterally. Nasal congestion without purulent discharge. Oropharynx normal. Neck supple without palpable adenopathy.  Regular rate and  Rhythm.  no murmurs. No edema or JVD.   Chest is clear; no wheezes or rales.    Assessment     (R05) Cough  (primary encounter diagnosis)  Comment:  Chest x-ray deferred  Plan: azithromycin (ZITHROMAX) 250 MG tablet          Call or return to clinic prn if these symtoms worsen, fail to improve as anticipated, or if new symptoms develop.

## 2018-07-12 ENCOUNTER — OFFICE VISIT (OUTPATIENT)
Dept: FAMILY MEDICINE | Facility: CLINIC | Age: 68
End: 2018-07-12

## 2018-07-12 VITALS
DIASTOLIC BLOOD PRESSURE: 82 MMHG | SYSTOLIC BLOOD PRESSURE: 140 MMHG | WEIGHT: 165 LBS | OXYGEN SATURATION: 96 % | TEMPERATURE: 97.6 F | BODY MASS INDEX: 29.23 KG/M2 | HEIGHT: 63 IN | HEART RATE: 69 BPM

## 2018-07-12 DIAGNOSIS — Z00.00 ROUTINE HISTORY AND PHYSICAL EXAMINATION OF ADULT: Primary | ICD-10-CM

## 2018-07-12 DIAGNOSIS — I10 ESSENTIAL HYPERTENSION, BENIGN: ICD-10-CM

## 2018-07-12 DIAGNOSIS — Z91.89 AT RISK FOR OSTEOPOROSIS: ICD-10-CM

## 2018-07-12 DIAGNOSIS — K21.9 GASTROESOPHAGEAL REFLUX DISEASE, ESOPHAGITIS PRESENCE NOT SPECIFIED: ICD-10-CM

## 2018-07-12 DIAGNOSIS — R22.31 LUMP OF SKIN OF RIGHT UPPER EXTREMITY: ICD-10-CM

## 2018-07-12 DIAGNOSIS — E78.2 MIXED HYPERLIPIDEMIA: ICD-10-CM

## 2018-07-12 DIAGNOSIS — J45.30 MILD PERSISTENT ASTHMA WITHOUT COMPLICATION: ICD-10-CM

## 2018-07-12 PROCEDURE — 84460 ALANINE AMINO (ALT) (SGPT): CPT | Mod: 90 | Performed by: FAMILY MEDICINE

## 2018-07-12 PROCEDURE — 99397 PER PM REEVAL EST PAT 65+ YR: CPT | Performed by: FAMILY MEDICINE

## 2018-07-12 PROCEDURE — 80048 BASIC METABOLIC PNL TOTAL CA: CPT | Mod: 90 | Performed by: FAMILY MEDICINE

## 2018-07-12 PROCEDURE — 80061 LIPID PANEL: CPT | Performed by: FAMILY MEDICINE

## 2018-07-12 PROCEDURE — 36415 COLL VENOUS BLD VENIPUNCTURE: CPT | Performed by: FAMILY MEDICINE

## 2018-07-12 NOTE — MR AVS SNAPSHOT
After Visit Summary   7/12/2018    Priscilla Linda    MRN: [id number]           Patient Information     Date Of Birth          1950        Visit Information        Provider Department      7/12/2018 8:00 AM Beatrice Benz MD [facility]        Today's Diagnoses     Routine history and physical examination of adult    -  1    Mixed hyperlipidemia        Essential hypertension, benign        Lump of skin of right upper extremity        Gastroesophageal reflux disease, esophagitis presence not specified        At risk for osteoporosis        Mild persistent asthma without complication          Care Instructions    New Recombinant shingles vaccine (Shingrix): call your insurance for information on cost    Monitor blood pressure at home  Three blood pressures a week for four weeks and send me results    1000 IU D3- check multiple vitamin dose          Follow-ups after your visit        Who to contact     If you have questions or need follow up information about today's clinic visit or your schedule please contact [facility] directly at [phone].  Normal or non-critical lab and imaging results will be communicated to you by [employer], letter or phone within 4 business days after the clinic has received the results. If you do not hear from us within 7 days, please contact the clinic through [employer] or phone. If you have a critical or abnormal lab result, we will notify you by phone as soon as possible.  Submit refill requests through [employer] or call your pharmacy and they will forward the refill request to us. Please allow 3 business days for your refill to be completed.          Additional Information About Your Visit        [employer] Information     [employer] gives you secure access to your electronic health record. If you see a primary care provider, you can also send messages to your care team and make appointments. If you have questions, please  "call your primary care clinic.  If you do not have a primary care provider, please call [phone] and they will assist you.        Care EveryWhere ID     This is your Care EveryWhere ID. This could be used by other organizations to access your [place] medical records  [id number]        Your Vitals Were     Pulse Temperature Height Last Period Pulse Oximetry BMI (Body Mass Index)    69 97.6  F (36.4  C) (Oral) 1.6 m (5' 3\") (LMP Unknown) 96% 29.23 kg/m2       Blood Pressure from Last 3 Encounters:   07/12/18 140/82   03/12/18 138/80   02/07/18 128/80    Weight from Last 3 Encounters:   07/12/18 74.8 kg (165 lb)   03/12/18 73.9 kg (163 lb)   02/07/18 74.2 kg (163 lb 9.6 oz)              We Performed the Following     ALT (QUEST)     BASIC METABOLIC PANEL (QUEST)     Dexa hip/pelvis/spine*     Lipid Profile     VENOUS COLLECTION          Today's Medication Changes          These changes are accurate as of 7/12/18 11:59 PM.  If you have any questions, ask your nurse or doctor.               Start taking these medicines.        Dose/Directions    omeprazole 20 MG CR capsule   Commonly known as:  priLOSEC   Used for:  Gastroesophageal reflux disease, esophagitis presence not specified   Started by:  Beatrice Benz MD        Dose:  20 mg   Take 1 capsule (20 mg) by mouth daily   Quantity:  90 capsule   Refills:  3            Where to get your medicines      These medications were sent to [facility] # [id number] - [place], [place] - [address] [address]  [address], [facility] [address]     Phone:  [phone]     atorvastatin 20 MG tablet    budesonide 180 MCG/ACT inhaler    omeprazole 20 MG CR capsule    triamterene-hydrochlorothiazide 37.5-25 MG per tablet                Primary Care Provider Office Phone # Fax #    Beatrice Benz -[phone] [address] [facility] [address]  [facility] [address]        Equal Access to Services     СВЕТЛАНА WOODARD AH: Hadii ramon Valdez " tala reagan martinajuan antonio cifuentes. So [facility] [phone].    ATENCIÓN: Si behzad ga, tiene a petty disposición servicios gratuitos de asistencia lingüística. Shahrzad al [phone].    We comply with applicable federal civil rights laws and [place] laws. We do not discriminate on the basis of race, color, national origin, age, disability, sex, sexual orientation, or gender identity.            Thank you!     Thank you for choosing [facility]  for your care. Our goal is always to provide you with excellent care. Hearing back from our patients is one way we can continue to improve our services. Please take a few minutes to complete the written survey that you may receive in the mail after your visit with us. Thank you!             Your Updated Medication List - Protect others around you: Learn how to safely use, store and throw away your medicines at www.disposemymeds.org.          This list is accurate as of 7/12/18 11:59 PM.  Always use your most recent med list.                   Brand Name Dispense Instructions for use Diagnosis    acyclovir 800 MG tablet    ZOVIRAX    24 tablet    For outbreaks    Herpes simplex virus (HSV) infection       albuterol 108 (90 Base) MCG/ACT Inhaler    PROAIR HFA    1 Inhaler    Inhale 1-2 puffs into the lungs every 4 hours as needed    Mild persistent asthma without complication       ASPIRIN NOT PRESCRIBED    INTENTIONAL     continuous prn for other Antiplatelet medication not prescribed intentionally due to Refusal by patient and Not indicated based on age        atorvastatin 20 MG tablet    LIPITOR    90 tablet    Take 1 tablet (20 mg) by mouth daily    Mixed hyperlipidemia       budesonide 180 MCG/ACT inhaler    PULMICORT FLEXHALER    2 Inhaler    Inhale 1 puff into the lungs 2 times daily    Mild persistent asthma without complication       Lutein 10 MG Tabs           metroNIDAZOLE 0.75 % cream    METROCREAM           MULTI-vitamin  /IRON Tabs      1 TABLET DAILY        omeprazole 20 MG CR capsule    priLOSEC    90 capsule    Take 1 capsule (20 mg) by mouth daily    Gastroesophageal reflux disease, esophagitis presence not specified       ranitidine 300 MG tablet    ZANTAC    90 tablet    Take 1 tablet (300 mg) by mouth At Bedtime    Gastroesophageal reflux disease, esophagitis presence not specified       triamterene-hydrochlorothiazide 37.5-25 MG per tablet    MAXZIDE-25    90 tablet    Take 1 tablet by mouth daily    Essential hypertension, benign

## 2018-07-12 NOTE — PATIENT INSTRUCTIONS
New Recombinant shingles vaccine (Shingrix): call your insurance for information on cost    Monitor blood pressure at home  Three blood pressures a week for four weeks and send me results    1000 IU D3- check multiple vitamin dose

## 2018-07-12 NOTE — LETTER
[facility]   A partner of [facility]              [facility]  [facility] [address]  [place]  [address]        For Emergencies:  Call 911      For Clinic Appointments:   [phone]                     New Recombinant shingles vaccine (Shingrix): call your insurance for information on cost    Monitor blood pressure at home  Three blood pressures a week for four weeks and send me results    1000 IU D3- check multiple vitamin dose

## 2018-07-12 NOTE — NURSING NOTE
Patient is here for a full physical exam. Patient has healthcare directive at home and will bring it in the next time she has an office visit so we have on file.    Pre-Visit Screening :  Immunizations : up to date  Colon Screening : is up to date  Mammogram: up to date   Asthma Action Test/Plan : ACT given today   PHQ9 :  PHQ-2 done today   GAD7 :  No concerns  Patient's  BMI Body mass index is 29.23 kg/(m^2).  Questioned patient about current smoking habits.  Pt. has never smoked.  OK to leave a detailed voice message regarding today's visit Yes, phone # [phone]      ETOH screening:  Questions:  1-How often do you have a drink containing alcohol?                             1-2 times per year(s)  2-How many drinks containing alcohol do you have on a typical day when you are         Drinking?                              1 and 2   3- How often do you have 5 or more drinks on one occasion?                              Never

## 2018-07-12 NOTE — PROGRESS NOTES
Chief Complaint: Priscilla Linda is an 67 year old woman who presents for preventive health visit.      Besides routine health maintenance,  she would like to discuss :.     Feels apprehensive today- blood pressure is up   has neuropathy- falling more- declining health  Retired for 4.5 years- thinking about starting volunteer work-     Arthritic feet: recent cortisone injections  Considering foot surgery in the future    Back on omeprazole / ranitidine not controlling GERD symptoms    Has a lump proximal upper right arm- she does not believe it is changing in size- but wishes it excised  Willing to image first before pursuing excision  MRI- [facility] ordered      Healthy Habits:  Do you get at least three servings of calcium containing foods daily (dairy, green leafy vegetables, etc.)? Yes    Multiple vitamin  Outside of work or daily activities, how many days per week do you exercise for 30 minutes or longer? Regular exercise- lifetime, rides bike  Have you had an eye exam in the past two years? yes  Do you see a dentist twice per year? Yes- procedure for receeding gums    PHQ-2  Over the last two weeks- Have you been bothered by little interest or pleasure in doing things?  No  Over the last two weeks- Have you been feeling down, depressed, or hopeless?  No      Advanced directive: bring to office    Social History   Substance Use Topics     Smoking status: Never Smoker     Smokeless tobacco: Never Used     Alcohol use 0.0 oz/week     0 Standard drinks or equivalent per week      Comment: very occ     The patient does not drink >3 drinks per day nor >7 drinks per week.    Reviewed orders with patient.  Reviewed health maintenance and updated orders accordingly - Yes      History of abnormal Pap smear: NO - age 66 - see link Cervical Cytology Screening Guidelines  All Histories reviewed and updated in [facility].      ROS:  CONSTITUTIONAL: NEGATIVE for fever, chills, change in weight  INTEGUMENTARY/SKIN: NEGATIVE  "for worrisome rashes, moles or lesions  EYES: NEGATIVE for vision changes or irritation  ENT: NEGATIVE for ear, mouth and throat problems  RESP: NEGATIVE for significant cough or SOB- asthma symptoms are contolled  BREAST: NEGATIVE for masses, tenderness or discharge  CV: NEGATIVE for chest pain, palpitations or peripheral edema  GI: NEGATIVE for nausea, abdominal pain,  or change in bowel habits  POSITIVE for heartburn  : NEGATIVE for unusual urinary or vaginal symptoms. No vaginal bleeding.  MUSCULOSKELETAL:POSITIVE for foot pain/ lump, right arm   NEURO: NEGATIVE for weakness, dizziness or paresthesias  PSYCHIATRIC:POSITIVE for increasing anxiety about her 's health          OBJECTIVE:  /82 (BP Location: Right arm, Patient Position: Sitting, Cuff Size: Adult Regular)  Pulse 69  Temp 97.6  F (36.4  C) (Oral)  Ht 1.6 m (5' 3\")  Wt 74.8 kg (165 lb)  LMP  (LMP Unknown)  SpO2 96%  BMI 29.23 kg/m2  General appearance: Healthy    Skin: Normal. No atypical appearing moles on inspection of trunk and extremities.    External ears  and canals clear bilaterally. TM's normal bilaterally. Nose normal without lesions or discharge. Oropharynx normal. Neck supple without palpable adenopathy.    Breasts are symmetric.  No dominant, discrete, fixed  or suspicious masses are noted.  No skin or nipple changes or axillary nodes.      Regular rate and  rhythm. S1 and S2 normal, no murmurs, clicks, gallops or rubs. No edema or JVD. Chest is clear; no wheezes or rales.    The abdomen is soft without tenderness, guarding, mass or organomegaly. Bowel sounds are normal. No CVA tenderness or inguinal adenopathy noted.    Pelvic: deferred  Rectal exam: normal    Extremities: negative.      COUNSELING:  Reviewed preventive health counseling, as reflected in patient instructions  Special attention given to:        Regular exercise       Healthy diet/nutrition       Osteoporosis Prevention/Bone Health       Advance Care " "Planning       Shingles vaccine    ATP III Guidelines  ICSI Preventive Guidelines    ASSESSMENT/PLAN:  (Z00.00) Routine history and physical examination of adult  (primary encounter diagnosis)  Comment: BMI:   Estimated body mass index is 29.23 kg/(m^2) as calculated from the following:    Height as of this encounter: 1.6 m (5' 3\").    Weight as of this encounter: 74.8 kg (165 lb).   Weight management plan: Discussed healthy diet and exercise guidelines and patient will follow up in 12 months in clinic to re-evaluate.        Plan: Lipid Profile, VENOUS COLLECTION, BASIC         METABOLIC PANEL ([facility])            (E78.2) Mixed hyperlipidemia  Comment:   Plan: atorvastatin (LIPITOR) 20 MG tablet, Lipid         Profile, VENOUS COLLECTION, ALT ([facility])           (I10) Essential hypertension, benign  Comment: needs monitoring- home pressures recommended  Plan: triamterene-hydrochlorothiazide (MAXZIDE-25)         37.5-25 MG per tablet, Lipid Profile, VENOUS         COLLECTION, BASIC METABOLIC PANEL ([facility])            (R22.31) Lump of skin of right upper extremity  Comment:   Plan: MRI soft tissue- CDI    (K21.9) Gastroesophageal reflux disease, esophagitis presence not specified  Comment:   Plan: failed trial of ranitidine    (Z91.89) At risk for osteoporosis  Comment:   Plan: Dexa hip/pelvis/spine*            (J45.30) Mild persistent asthma without complication  Comment: symptoms controlled with daily inhaler  Plan: budesonide (PULMICORT FLEXHALER) 180 MCG/ACT         inhaler           "

## 2018-07-13 LAB
ALT SERPL-CCNC: 25 U/L (ref 6–29)
BUN SERPL-MCNC: 18 MG/DL (ref 7–25)
BUN/CREATININE RATIO: NORMAL (CALC) (ref 6–22)
CALCIUM SERPL-MCNC: 10.2 MG/DL (ref 8.6–10.4)
CHLORIDE SERPLBLD-SCNC: 103 MMOL/L (ref 98–110)
CHOLEST SERPL-MCNC: 257 MG/DL
CHOLEST/HDLC SERPL: 2.7 (CALC)
CO2 SERPL-SCNC: 28 MMOL/L (ref 20–31)
CREAT SERPL-MCNC: 0.79 MG/DL (ref 0.5–0.99)
EGFR AFRICAN AMERICAN - QUEST: 90 ML/MIN/1.73M2
GFR SERPL CREATININE-BSD FRML MDRD: 78 ML/MIN/1.73M2
GLUCOSE - QUEST: 88 MG/DL (ref 65–99)
HDLC SERPL-MCNC: 94 MG/DL
LDLC SERPL CALC-MCNC: 130 MG/DL (CALC)
NONHDLC SERPL-MCNC: 163 MG/DL (CALC)
POTASSIUM SERPL-SCNC: 3.8 MMOL/L (ref 3.5–5.3)
SODIUM SERPL-SCNC: 140 MMOL/L (ref 135–146)
TRIGL SERPL-MCNC: 189 MG/DL

## 2018-07-15 RX ORDER — TRIAMTERENE/HYDROCHLOROTHIAZID 37.5-25 MG
1 TABLET ORAL DAILY
Qty: 90 TABLET | Refills: 1 | Status: SHIPPED | OUTPATIENT
Start: 2018-07-15 | End: 2019-03-29

## 2018-07-15 RX ORDER — ATORVASTATIN CALCIUM 20 MG/1
20 TABLET, FILM COATED ORAL DAILY
Qty: 90 TABLET | Refills: 3 | Status: SHIPPED | OUTPATIENT
Start: 2018-07-15 | End: 2019-07-01

## 2018-07-16 ENCOUNTER — TRANSFERRED RECORDS (OUTPATIENT)
Dept: FAMILY MEDICINE | Facility: CLINIC | Age: 68
End: 2018-07-16

## 2018-07-17 ENCOUNTER — TRANSFERRED RECORDS (OUTPATIENT)
Dept: FAMILY MEDICINE | Facility: CLINIC | Age: 68
End: 2018-07-17

## 2018-08-13 ENCOUNTER — TRANSFERRED RECORDS (OUTPATIENT)
Dept: FAMILY MEDICINE | Facility: CLINIC | Age: 68
End: 2018-08-13

## 2018-08-20 ENCOUNTER — MYC MEDICAL ADVICE (OUTPATIENT)
Dept: FAMILY MEDICINE | Facility: CLINIC | Age: 68
End: 2018-08-20

## 2018-08-20 NOTE — TELEPHONE ENCOUNTER
From: Priscilla Gomes  To: Beatrice Benz MD  Sent: 8/20/2018 7:08 AM CDT  Subject: Updates about my health    Last Monday I stopped in and gave the  an envelope with your name on it. It had my BP readings in it for the month. She said she would see that you got it. I haven't heard from you so I am wondering if you did receive and review them. I had also sent you a message about my dexa scan and I haven't heard back on that. Thanks . Scooter gomes

## 2018-08-23 ENCOUNTER — TRANSFERRED RECORDS (OUTPATIENT)
Dept: FAMILY MEDICINE | Facility: CLINIC | Age: 68
End: 2018-08-23

## 2018-09-26 ENCOUNTER — HOSPITAL ENCOUNTER (EMERGENCY)
Facility: CLINIC | Age: 68
Discharge: HOME | End: 2018-09-26
Attending: EMERGENCY MEDICINE | Admitting: EMERGENCY MEDICINE
Payer: COMMERCIAL

## 2018-09-26 VITALS
TEMPERATURE: 98.5 F | SYSTOLIC BLOOD PRESSURE: 138 MMHG | RESPIRATION RATE: 16 BRPM | OXYGEN SATURATION: 98 % | DIASTOLIC BLOOD PRESSURE: 93 MMHG
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
## 2018-09-26 DIAGNOSIS — R55 NEAR SYNCOPE: ICD-10-CM

## 2018-09-26 LAB — INTERPRETATION ECG - MUSE: NORMAL

## 2018-09-26 PROCEDURE — 93005 ELECTROCARDIOGRAM TRACING: CPT

## 2018-09-26 PROCEDURE — 99284 EMERGENCY DEPT VISIT MOD MDM: CPT

## 2018-09-26 ASSESSMENT — ENCOUNTER SYMPTOMS
WOUND: 0
LIGHT-HEADEDNESS: 1
TREMORS: 1
DIZZINESS: 1
NECK PAIN: 1

## 2018-09-26 NOTE — ED NOTES
Patient discharged to home. Patient received follow-up information with PCP if needed. Patient received discharge instructions and has no other questions at this time.
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
## 2018-09-26 NOTE — ED AVS SNAPSHOT
[facility] Emergency Department    [address]    [facility] [address]    Phone:  [phone]    Fax:  [phone]                                       Priscilla Linda   MRN: [id number]    Department:  [facility] Emergency Department   Date of Visit:  9/26/2018           After Visit Summary Signature Page     I have received my discharge instructions, and my questions have been answered. I have discussed any challenges I see with this plan with the nurse or doctor.    ..........................................................................................................................................  Patient/Patient Representative Signature      ..........................................................................................................................................  Patient Representative Print Name and Relationship to Patient    ..................................................               ................................................  Date                                   Time    ..........................................................................................................................................  Reviewed by Signature/Title    ...................................................              ..............................................  Date                                               Time          22EPIC Rev 08/18

## 2018-09-26 NOTE — ED NOTES
Bed: ED13  Expected date: 9/26/18  Expected time:   Means of arrival: Ambulance  Comments:  [phone]

## 2018-10-10 ENCOUNTER — OFFICE VISIT (OUTPATIENT)
Dept: FAMILY MEDICINE | Facility: CLINIC | Age: 68
End: 2018-10-10

## 2018-10-10 VITALS
HEART RATE: 69 BPM | OXYGEN SATURATION: 98 % | BODY MASS INDEX: 29.41 KG/M2 | TEMPERATURE: 98.4 F | HEIGHT: 63 IN | SYSTOLIC BLOOD PRESSURE: 126 MMHG | WEIGHT: 166 LBS | DIASTOLIC BLOOD PRESSURE: 90 MMHG

## 2018-10-10 DIAGNOSIS — M19.071 OSTEOARTHRITIS OF RIGHT FOOT, UNSPECIFIED OSTEOARTHRITIS TYPE: ICD-10-CM

## 2018-10-10 DIAGNOSIS — Z01.818 PREOPERATIVE EXAMINATION: Primary | ICD-10-CM

## 2018-10-10 PROBLEM — M67.432 GANGLION CYST OF WRIST, LEFT: Status: ACTIVE | Noted: 2018-10-10

## 2018-10-10 LAB
ERYTHROCYTE [DISTWIDTH] IN BLOOD BY AUTOMATED COUNT: 13 %
HCT VFR BLD AUTO: 45.8 % (ref 35–47)
HEMOGLOBIN: 15.6 G/DL (ref 11.7–15.7)
MCH RBC QN AUTO: 30.2 PG (ref 26–33)
MCHC RBC AUTO-ENTMCNC: 34.1 G/DL (ref 31–36)
MCV RBC AUTO: 88.5 FL (ref 78–100)
PLATELET COUNT - QUEST: 343 10^9/L (ref 150–375)
RBC # BLD AUTO: 5.17 10*12/L (ref 3.8–5.2)
WBC # BLD AUTO: 7.6 10*9/L (ref 4–11)

## 2018-10-10 PROCEDURE — 99214 OFFICE O/P EST MOD 30 MIN: CPT | Performed by: FAMILY MEDICINE

## 2018-10-10 PROCEDURE — 36415 COLL VENOUS BLD VENIPUNCTURE: CPT | Performed by: FAMILY MEDICINE

## 2018-10-10 PROCEDURE — 85027 COMPLETE CBC AUTOMATED: CPT | Performed by: FAMILY MEDICINE

## 2018-10-10 PROCEDURE — 80048 BASIC METABOLIC PNL TOTAL CA: CPT | Mod: 90 | Performed by: FAMILY MEDICINE

## 2018-10-10 NOTE — MR AVS SNAPSHOT
"              After Visit Summary   10/10/2018    Priscilla Linda    MRN: [id number]           Patient Information     Date Of Birth          1950        Visit Information        Provider Department      10/10/2018 12:00 PM Beatrice Benz MD [facility]        Today's Diagnoses     Preoperative examination    -  1    Osteoarthritis of right foot, unspecified osteoarthritis type           Follow-ups after your visit        Who to contact     If you have questions or need follow up information about today's clinic visit or your schedule please contact [facility] directly at [phone].  Normal or non-critical lab and imaging results will be communicated to you by [employer], letter or phone within 4 business days after the clinic has received the results. If you do not hear from us within 7 days, please contact the clinic through Instagar[employer] or phone. If you have a critical or abnormal lab result, we will notify you by phone as soon as possible.  Submit refill requests through [employer] or call your pharmacy and they will forward the refill request to us. Please allow 3 business days for your refill to be completed.          Additional Information About Your Visit        MyChart Information     [employer] gives you secure access to your electronic health record. If you see a primary care provider, you can also send messages to your care team and make appointments. If you have questions, please call your primary care clinic.  If you do not have a primary care provider, please call [phone] and they will assist you.        Care EveryWhere ID     This is your Care EveryWhere ID. This could be used by other organizations to access your [facility] medical records  [id number]        Your Vitals Were     Pulse Temperature Height Last Period Pulse Oximetry BMI (Body Mass Index)    69 98.4  F (36.9  C) (Oral) 1.588 m (5' 2.5\") (LMP Unknown) 98% 29.88 kg/m2       Blood " Pressure from Last 3 Encounters:   10/10/18 126/90   09/26/18 (!) 138/93   07/12/18 140/82    Weight from Last 3 Encounters:   10/10/18 75.3 kg (166 lb)   07/12/18 74.8 kg (165 lb)   03/12/18 73.9 kg (163 lb)              We Performed the Following     BASIC METABOLIC PANEL (QUEST)     HEMOGRAM/PLATELET (BFP)     VENOUS COLLECTION          Today's Medication Changes          These changes are accurate as of 10/10/18 11:59 PM.  If you have any questions, ask your nurse or doctor.               Stop taking these medicines if you haven't already. Please contact your care team if you have questions.     omeprazole 20 MG CR capsule   Commonly known as:  priLOSEC   Stopped by:  Beatrice Benz MD                    Primary Care Provider Office Phone # Fax #    Beatrice Benz [phone] [address] [place] [address]        Equal Access to Services     [facility]: Hadii aad ku hadasho Soomaali, waaxda luqadaha, qaybta kaalmada adeegyada, waxay idiin hayaan reji camarena . So [facility] [phone].    ATENCIÓN: Si habla español, tiene a petty disposición servicios gratuitos de asistencia lingüística. Llame al [phone].    We comply with applicable federal civil rights laws and [place] laws. We do not discriminate on the basis of race, color, national origin, age, disability, sex, sexual orientation, or gender identity.            Thank you!     Thank you for choosing [facility]  for your care. Our goal is always to provide you with excellent care. Hearing back from our patients is one way we can continue to improve our services. Please take a few minutes to complete the written survey that you may receive in the mail after your visit with us. Thank you!             Your Updated Medication List - Protect others around you: Learn how to safely use, store and throw away your medicines at www.disposemymeds.org.          This list is accurate as  of 10/10/18 11:59 PM.  Always use your most recent med list.                   Brand Name Dispense Instructions for use Diagnosis    acyclovir 800 MG tablet    ZOVIRAX    24 tablet    For outbreaks    Herpes simplex virus (HSV) infection       albuterol 108 (90 Base) MCG/ACT inhaler    PROAIR HFA    1 Inhaler    Inhale 1-2 puffs into the lungs every 4 hours as needed    Mild persistent asthma without complication       ASPIRIN NOT PRESCRIBED    INTENTIONAL     continuous prn for other Antiplatelet medication not prescribed intentionally due to Refusal by patient and Not indicated based on age        atorvastatin 20 MG tablet    LIPITOR    90 tablet    Take 1 tablet (20 mg) by mouth daily    Mixed hyperlipidemia       budesonide 180 MCG/ACT inhaler    PULMICORT FLEXHALER    2 Inhaler    Inhale 1 puff into the lungs 2 times daily    Mild persistent asthma without complication       Lutein 10 MG Tabs           metroNIDAZOLE 0.75 % cream    METROCREAM          MULTI-vitamin  /IRON Tabs      1 TABLET DAILY        ranitidine 300 MG tablet    ZANTAC    90 tablet    Take 1 tablet (300 mg) by mouth At Bedtime    Gastroesophageal reflux disease, esophagitis presence not specified       triamterene-hydrochlorothiazide 37.5-25 MG per tablet    MAXZIDE-25    90 tablet    Take 1 tablet by mouth daily    Essential hypertension, benign

## 2018-10-10 NOTE — PROGRESS NOTES
[facility]  [address]  [address]  [facility] [address]  [phone]  Dept: [phone]    PRE-OP EVALUATION:  Today's date: 10/10/2018    Priscilla Linda (: 1950) presents for pre-operative evaluation assessment as requested by Dr. eWn.  She requires evaluation and anesthesia risk assessment prior to undergoing surgery/procedure for treatment of arthritis in joint of feet .    Proposed Surgery/ Procedure: Joint surgery in right foot, steroid injection in left foot for arthritis of joints  Date of Surgery/ Procedure: 10/18/18  Time of Surgery/ Procedure: TBD-will be in morning  Hospital/Surgical Facility: [facility]   Fax number for surgical facility: will be scanning and sending to them  Primary Physician: Beatrice Benz  Type of Anesthesia Anticipated: Foot block     Patient has a Health Care Directive or Living Will:  YES patient will be bringing in copy next time she comes into clinic     1. NO - Do you have a history of heart attack, stroke, stent, bypass or surgery on an artery in the head, neck, heart or legs?  2. NO - Do you ever have any pain or discomfort in your chest?  3. NO - Do you have a history of  Heart Failure?  4. NO - Are you troubled by shortness of breath when: walking on the level, up a slight hill or at night?  5. NO - Do you currently have a cold, bronchitis or other respiratory infection?  6. NO - Do you have a cough, shortness of breath or wheezing? Personal history of asthma, controlled- uses albuterol once last week  7. NO - Do you sometimes get pains in the calves of your legs when you walk?  8. NO - Do you or anyone in your family have previous history of blood clots?  9. NO - Do you or does anyone in your family have a serious bleeding problem such as prolonged bleeding following surgeries or cuts?  10. NO - Have you ever had problems with anemia or been told to take iron pills?  11. NO - Have you had  any abnormal blood loss such as black, tarry or bloody stools, or abnormal vaginal bleeding?  12. NO - Have you ever had a blood transfusion?  13. NO - Have you or any of your relatives ever had problems with anesthesia?  14. NO - Do you have sleep apnea, excessive snoring or daytime drowsiness?  15. NO - Do you have any prosthetic heart valves?  16. NO - Do you have prosthetic joints?  17. NO - Is there any chance that you may be pregnant?      HPI:     HPI related to upcoming procedure:  Arthritic feet- right is more painful than left and interferes with mowing/ walking      MEDICAL HISTORY:     Patient Active Problem List    Diagnosis Date Noted     Ganglion cyst of wrist, left 10/10/2018     Priority: Medium     Health Care Home 10/17/2012     Priority: Medium     State Tier Level:  Tier 2  Status:  NA   Care Coordinator:      See Letters for [facility] Care Plan           ACP (advance care planning) 03/21/2012     Priority: Medium     Advance Care Planning 4/25/2016: ACP Review of Chart / Resources Provided:  Reviewed chart for advance care plan.  Priscilla Linda has no plan or code status on file however states presence of ACP document. Copy requested. Confirmed code status reflects current choices pending receipt of document/advance care plan review.  Confirmed/documented legally designated decision makers.  Added by Gifty Aguiar                 Essential hypertension, benign 03/21/2012     Priority: Medium     Asthma, mild persistent 02/12/2010     Priority: Medium     Mixed hyperlipidemia 02/12/2010     Priority: Medium     Ocular migraine 02/12/2010     Priority: Medium     Problem list name updated by automated process. Provider to review       Herpes simplex virus (HSV) infection 02/12/2010     Priority: Medium     Problem list name updated by automated process. Provider to review        Past Medical History:   Diagnosis Date     Asthma      GERD (gastroesophageal reflux disease)      High cholesterol       Hypertension      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    Hx of Clomid       Past Surgical History:   Procedure Laterality Date     C NONSPECIFIC PROCEDURE  1999    ganglion cyst     C TOTAL ABDOM HYSTERECTOMY  1995    prolapse, marshal chay/ BSO     CATARACT IOL, RT/LT  2007    Right and Left     COLONOSCOPY  6/2013    polyps, repeat in 3 years     COLONOSCOPY  8/2016    tub adenoma/ repeat 5 years     HC COLONOSCOPY THRU STOMA, DIAGNOSTIC  2003,     hx of iron defic/ two normal colonoscopies     TUBAL LIGATION  1983     UPPER GI ENDOSCOPY  2003    hiatal hernia     Current Outpatient Prescriptions   Medication Sig Dispense Refill     acyclovir (ZOVIRAX) 800 MG tablet For outbreaks 24 tablet 1     albuterol (PROAIR HFA) 108 (90 BASE) MCG/ACT Inhaler Inhale 1-2 puffs into the lungs every 4 hours as needed 1 Inhaler 1     ASPIRIN NOT PRESCRIBED (INTENTIONAL) continuous prn for other Antiplatelet medication not prescribed intentionally due to Refusal by patient and Not indicated based on age       atorvastatin (LIPITOR) 20 MG tablet Take 1 tablet (20 mg) by mouth daily 90 tablet 3     budesonide (PULMICORT FLEXHALER) 180 MCG/ACT inhaler Inhale 1 puff into the lungs 2 times daily 2 Inhaler 3     Lutein 10 MG TABS        metroNIDAZOLE (METROCREAM) 0.75 % cream        MULTI-VITAMIN/IRON OR TABS 1 TABLET DAILY       ranitidine (ZANTAC) 300 MG tablet Take 1 tablet (300 mg) by mouth At Bedtime 90 tablet 3     triamterene-hydrochlorothiazide (MAXZIDE-25) 37.5-25 MG per tablet Take 1 tablet by mouth daily 90 tablet 1     OTC products: no recent use of OTC ASA, NSAIDS or Steroids  Advised to discontinue Lutein, multiple vitamin one week before surgery  No Known Allergies     Latex Allergy: NO    Social History   Substance Use Topics     Smoking status: Never Smoker     Smokeless tobacco: Never Used     Alcohol use 0.0 oz/week     0 Standard drinks or equivalent per week      Comment: very occ     History   Drug Use No  "      REVIEW OF SYSTEMS:   CONSTITUTIONAL: NEGATIVE for fever, chills, change in weight  ENT/MOUTH: NEGATIVE for ear, mouth and throat problems  RESP: NEGATIVE for significant cough or SOB  CV: NEGATIVE for chest pain, palpitations or peripheral edema  GI: NEGATIVE for nausea, abdominal pain, heartburn (doing well on ranitidine), or change in bowel habits  : negative for, dysuria and frequency      EXAM:   /90 (BP Location: Left arm, Patient Position: Sitting, Cuff Size: Adult Regular)  Pulse 69  Temp 98.4  F (36.9  C) (Oral)  Ht 1.588 m (5' 2.5\")  Wt 75.3 kg (166 lb)  LMP  (LMP Unknown)  SpO2 98%  BMI 29.88 kg/m2  GENERAL APPEARANCE: healthy, alert and no distress  HENT: ear canals and TM's normal and nose and mouth without ulcers or lesions  RESP: lungs clear to auscultation - no rales, rhonchi or wheezes  CV: regular rate and rhythm, normal S1 S2, no S3 or S4 and no murmur, click or rub   ABDOMEN: soft, nontender, no HSM or masses and bowel sounds normal  NEURO: Normal strength and tone, sensory exam grossly normal, mentation intact and speech normal    DIAGNOSTICS:   EKG: September -  RBBB, sinus rhythm with occasional ectopics    Hemoglobin   Date Value Ref Range Status   10/10/2018 15.6 11.7 - 15.7 g/dL Final   ]    Potassium   Date Value Ref Range Status   10/10/2018 4.0 3.5 - 5.3 mmol/L Final     Lab Results   Component Value Date    CR 0.89 10/10/2018         Recent Labs   Lab Test  07/12/18   0855  12/21/17   1245  12/21/17   1213   07/06/17   0910   HGB   --    --   16.2*   --   16.6*   PLT   --    --   353   --   230   NA  140  138   --    < >   --    POTASSIUM  3.8  4.3   --    < >   --    CR  0.79  0.99   --    < >   --     < > = values in this interval not displayed.        IMPRESSION:   Reason for surgery/procedure: painful arthritic foot.    The proposed surgical procedure is considered INTERMEDIATE risk.    REVISED CARDIAC RISK INDEX  The patient has the following serious " cardiovascular risks for perioperative complications such as (MI, PE, VFib and 3  AV Block):  No serious cardiac risks  INTERPRETATION: 0 risks: Class I (very low risk - 0.4% complication rate)    The patient has the following additional risks for perioperative complications:  No identified additional risks      ICD-10-CM    1. Preoperative examination Z01.818 HEMOGRAM/PLATELET (BFP)     VENOUS COLLECTION     BASIC METABOLIC PANEL (QUEST)   2. Osteoarthritis of right foot, unspecified osteoarthritis type M19.071        RECOMMENDATIONS:        --Patient is to take all scheduled medications on the day of surgery EXCEPT for modifications listed below.    APPROVAL GIVEN to proceed with proposed procedure, without further diagnostic evaluation       Signed Electronically by: Beatrice Benz MD    Copy of this evaluation report is provided to requesting physician.    [facility] Preop Guidelines    Revised Cardiac Risk Index

## 2018-10-11 LAB
BUN SERPL-MCNC: 18 MG/DL (ref 7–25)
BUN/CREATININE RATIO: NORMAL (CALC) (ref 6–22)
CALCIUM SERPL-MCNC: 10.4 MG/DL (ref 8.6–10.4)
CHLORIDE SERPLBLD-SCNC: 103 MMOL/L (ref 98–110)
CO2 SERPL-SCNC: 25 MMOL/L (ref 20–32)
CREAT SERPL-MCNC: 0.89 MG/DL (ref 0.5–0.99)
EGFR AFRICAN AMERICAN - QUEST: 78 ML/MIN/1.73M2
GFR SERPL CREATININE-BSD FRML MDRD: 67 ML/MIN/1.73M2
GLUCOSE - QUEST: 89 MG/DL (ref 65–99)
POTASSIUM SERPL-SCNC: 4 MMOL/L (ref 3.5–5.3)
SODIUM SERPL-SCNC: 140 MMOL/L (ref 135–146)

## 2018-10-11 ASSESSMENT — ASTHMA QUESTIONNAIRES: ACT_TOTALSCORE: 22

## 2018-11-01 ENCOUNTER — TRANSFERRED RECORDS (OUTPATIENT)
Dept: FAMILY MEDICINE | Facility: CLINIC | Age: 68
End: 2018-11-01

## 2018-11-15 ENCOUNTER — HOSPITAL ENCOUNTER (OUTPATIENT)
Dept: MAMMOGRAPHY | Facility: CLINIC | Age: 68
Discharge: HOME | End: 2018-11-15
Attending: FAMILY MEDICINE | Admitting: FAMILY MEDICINE
Payer: MEDICARE

## 2018-11-15 DIAGNOSIS — Z12.31 VISIT FOR SCREENING MAMMOGRAM: ICD-10-CM

## 2018-11-15 PROCEDURE — 77067 SCR MAMMO BI INCL CAD: CPT

## 2018-12-03 ENCOUNTER — TRANSFERRED RECORDS (OUTPATIENT)
Dept: FAMILY MEDICINE | Facility: CLINIC | Age: 68
End: 2018-12-03

## 2018-12-07 DIAGNOSIS — B00.9 HERPES SIMPLEX VIRUS (HSV) INFECTION: ICD-10-CM

## 2018-12-07 RX ORDER — ACYCLOVIR 800 MG/1
TABLET ORAL
Qty: 24 TABLET | Refills: 1 | Status: SHIPPED | OUTPATIENT
Start: 2018-12-07 | End: 2020-01-23

## 2018-12-07 NOTE — TELEPHONE ENCOUNTER
Received incoming refill request for   Pending Prescriptions:                       Disp   Refills    acyclovir (ZOVIRAX) 800 MG tablet         24 tab*1            Sig: For outbreaks    Patient was last seen on 10/10/18 for preop and last refill of this medication was given on 9/2/17. Routing to Dr. Benz for approval or denial.

## 2018-12-19 ENCOUNTER — TRANSFERRED RECORDS (OUTPATIENT)
Dept: FAMILY MEDICINE | Facility: CLINIC | Age: 68
End: 2018-12-19

## 2019-01-27 ENCOUNTER — HOSPITAL ENCOUNTER (EMERGENCY)
Facility: CLINIC | Age: 69
Discharge: HOME | End: 2019-01-27
Attending: EMERGENCY MEDICINE | Admitting: EMERGENCY MEDICINE
Payer: MEDICARE

## 2019-01-27 VITALS
RESPIRATION RATE: 20 BRPM | OXYGEN SATURATION: 98 % | WEIGHT: 167.99 LBS | SYSTOLIC BLOOD PRESSURE: 148 MMHG | DIASTOLIC BLOOD PRESSURE: 80 MMHG | HEART RATE: 72 BPM | TEMPERATURE: 99 F | BODY MASS INDEX: 30.24 KG/M2

## 2019-01-27 DIAGNOSIS — R11.2 NAUSEA AND VOMITING, INTRACTABILITY OF VOMITING NOT SPECIFIED, UNSPECIFIED VOMITING TYPE: ICD-10-CM

## 2019-01-27 LAB
ALBUMIN UR-MCNC: NEGATIVE MG/DL
ANION GAP SERPL CALCULATED.3IONS-SCNC: 10 MMOL/L (ref 3–14)
APPEARANCE UR: CLEAR
BASOPHILS # BLD AUTO: 0 10E9/L (ref 0–0.2)
BASOPHILS NFR BLD AUTO: 0.3 %
BILIRUB UR QL STRIP: NEGATIVE
BUN SERPL-MCNC: 18 MG/DL (ref 7–30)
CALCIUM SERPL-MCNC: 9.9 MG/DL (ref 8.5–10.1)
CHLORIDE SERPL-SCNC: 110 MMOL/L (ref 94–109)
CO2 SERPL-SCNC: 23 MMOL/L (ref 20–32)
COLOR UR AUTO: YELLOW
CREAT SERPL-MCNC: 0.84 MG/DL (ref 0.52–1.04)
DIFFERENTIAL METHOD BLD: ABNORMAL
EOSINOPHIL # BLD AUTO: 0 10E9/L (ref 0–0.7)
EOSINOPHIL NFR BLD AUTO: 0.1 %
ERYTHROCYTE [DISTWIDTH] IN BLOOD BY AUTOMATED COUNT: 13.1 % (ref 10–15)
FLUAV+FLUBV AG SPEC QL: NEGATIVE
FLUAV+FLUBV AG SPEC QL: NEGATIVE
GFR SERPL CREATININE-BSD FRML MDRD: 72 ML/MIN/{1.73_M2}
GLUCOSE SERPL-MCNC: 128 MG/DL (ref 70–99)
GLUCOSE UR STRIP-MCNC: NEGATIVE MG/DL
HCT VFR BLD AUTO: 45.8 % (ref 35–47)
HGB BLD-MCNC: 15.6 G/DL (ref 11.7–15.7)
HGB UR QL STRIP: NEGATIVE
IMM GRANULOCYTES # BLD: 0 10E9/L (ref 0–0.4)
IMM GRANULOCYTES NFR BLD: 0.3 %
KETONES UR STRIP-MCNC: 20 MG/DL
LEUKOCYTE ESTERASE UR QL STRIP: NEGATIVE
LIPASE SERPL-CCNC: 127 U/L (ref 73–393)
LYMPHOCYTES # BLD AUTO: 1 10E9/L (ref 0.8–5.3)
LYMPHOCYTES NFR BLD AUTO: 8.7 %
MCH RBC QN AUTO: 29.9 PG (ref 26.5–33)
MCHC RBC AUTO-ENTMCNC: 34.1 G/DL (ref 31.5–36.5)
MCV RBC AUTO: 88 FL (ref 78–100)
MONOCYTES # BLD AUTO: 0.5 10E9/L (ref 0–1.3)
MONOCYTES NFR BLD AUTO: 3.8 %
MUCOUS THREADS #/AREA URNS LPF: PRESENT /LPF
NEUTROPHILS # BLD AUTO: 10.2 10E9/L (ref 1.6–8.3)
NEUTROPHILS NFR BLD AUTO: 86.8 %
NITRATE UR QL: NEGATIVE
NRBC # BLD AUTO: 0 10*3/UL
NRBC BLD AUTO-RTO: 0 /100
PH UR STRIP: 6 PH (ref 5–7)
PLATELET # BLD AUTO: 355 10E9/L (ref 150–450)
POTASSIUM SERPL-SCNC: 3.4 MMOL/L (ref 3.4–5.3)
RBC # BLD AUTO: 5.22 10E12/L (ref 3.8–5.2)
RBC #/AREA URNS AUTO: 1 /HPF (ref 0–2)
SODIUM SERPL-SCNC: 143 MMOL/L (ref 133–144)
SOURCE: ABNORMAL
SP GR UR STRIP: 1.01 (ref 1–1.03)
SPECIMEN SOURCE: NORMAL
SQUAMOUS #/AREA URNS AUTO: <1 /HPF (ref 0–1)
UROBILINOGEN UR STRIP-MCNC: 0 MG/DL (ref 0–2)
WBC # BLD AUTO: 11.7 10E9/L (ref 4–11)
WBC #/AREA URNS AUTO: 2 /HPF (ref 0–5)

## 2019-01-27 PROCEDURE — 80048 BASIC METABOLIC PNL TOTAL CA: CPT | Performed by: EMERGENCY MEDICINE

## 2019-01-27 PROCEDURE — 25000128 H RX IP 250 OP 636: Performed by: EMERGENCY MEDICINE

## 2019-01-27 PROCEDURE — 83690 ASSAY OF LIPASE: CPT | Performed by: EMERGENCY MEDICINE

## 2019-01-27 PROCEDURE — 87804 INFLUENZA ASSAY W/OPTIC: CPT | Performed by: EMERGENCY MEDICINE

## 2019-01-27 PROCEDURE — 96361 HYDRATE IV INFUSION ADD-ON: CPT

## 2019-01-27 PROCEDURE — 81001 URINALYSIS AUTO W/SCOPE: CPT | Performed by: EMERGENCY MEDICINE

## 2019-01-27 PROCEDURE — 96375 TX/PRO/DX INJ NEW DRUG ADDON: CPT

## 2019-01-27 PROCEDURE — 85025 COMPLETE CBC W/AUTO DIFF WBC: CPT | Performed by: EMERGENCY MEDICINE

## 2019-01-27 PROCEDURE — 96374 THER/PROPH/DIAG INJ IV PUSH: CPT

## 2019-01-27 PROCEDURE — 99284 EMERGENCY DEPT VISIT MOD MDM: CPT | Mod: 25

## 2019-01-27 RX ORDER — SODIUM CHLORIDE 9 MG/ML
1000 INJECTION, SOLUTION INTRAVENOUS CONTINUOUS
Status: DISCONTINUED | OUTPATIENT
Start: 2019-01-27 | End: 2019-01-27 | Stop reason: HOSPADM

## 2019-01-27 RX ORDER — ONDANSETRON 4 MG/1
4 TABLET, ORALLY DISINTEGRATING ORAL EVERY 6 HOURS PRN
Qty: 10 TABLET | Refills: 0 | Status: SHIPPED | OUTPATIENT
Start: 2019-01-27 | End: 2019-03-29

## 2019-01-27 RX ORDER — DIPHENHYDRAMINE HYDROCHLORIDE 50 MG/ML
25 INJECTION INTRAMUSCULAR; INTRAVENOUS ONCE
Status: COMPLETED | OUTPATIENT
Start: 2019-01-27 | End: 2019-01-27

## 2019-01-27 RX ADMIN — DIPHENHYDRAMINE HYDROCHLORIDE 25 MG: 50 INJECTION, SOLUTION INTRAMUSCULAR; INTRAVENOUS at 12:04

## 2019-01-27 RX ADMIN — SODIUM CHLORIDE 1000 ML: 9 INJECTION, SOLUTION INTRAVENOUS at 12:04

## 2019-01-27 RX ADMIN — PROCHLORPERAZINE EDISYLATE 10 MG: 5 INJECTION INTRAMUSCULAR; INTRAVENOUS at 12:04

## 2019-01-27 ASSESSMENT — ENCOUNTER SYMPTOMS
BLOOD IN STOOL: 0
APPETITE CHANGE: 1
SHORTNESS OF BREATH: 0
ABDOMINAL PAIN: 0
VOMITING: 1
NAUSEA: 1
DIARRHEA: 0

## 2019-01-27 NOTE — ED PROVIDER NOTES
History     Chief Complaint:  Nausea      HPI   Priscilla Linda is a 68 year old female who presents to the emergency department for evaluation of nausea. The patient was seen yesterday for nausea and vomiting and was discharged with Zofran. She states that she took one before bed and one this AM, but still ended up vomiting. She tried to eat before bed last night but only had a few bites of a banana. She denies abdominal pain, chest pain, shortness of breath, diarrhea or blood in her stool.     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   Respiratory: Negative for shortness of breath.    Cardiovascular: Negative for chest pain.   Gastrointestinal: Positive for nausea and vomiting. Negative for abdominal pain, blood in stool and diarrhea.   All other systems reviewed and are  negative.        Physical Exam   First Vitals:  BP: (!) 159/107  Pulse: 88  Heart Rate: 88  Temp: 99  F (37.2  C)  Resp: 16  Weight: 76.2 kg (167 lb 15.9 oz)  SpO2: 100 %      Physical Exam  Constitutional: Patient is well appearing. No distress.  Head: Atraumatic.  Mouth/Throat: Oropharynx is clear and moist. No oropharyngeal exudate.  Eyes: Conjunctivae and EOM are normal. No scleral icterus.  Neck: Normal range of motion. Neck supple.   Cardiovascular: Normal rate, regular rhythm, normal heart sounds and intact distal pulses.   Pulmonary/Chest: Breath sounds normal. No respiratory distress.  Abdominal: Soft. Bowel sounds are normal. No distension. No tenderness. No rebound or guarding.   Musculoskeletal: Normal range of motion. No edema or tenderness.   Neurological: Alert and orientated to person, place, and time. No observable focal neuro deficit  Skin: Warm and dry. No rash noted. Not diaphoretic.      Emergency Department Course     ECG:  ECG taken at 1141, ECG read at 1151  Normal sinus rhythm  Right bundle branch block  Abnormal ECG  Rate 68 bpm. MT interval 146 ms. QRS duration 144 ms. QT/QTc 450/478 ms. P-R-T axes 38 0 18.    Laboratory:  Laboratory findings were communicated with the patient who voiced understanding of the findings.    Lipase: 127  Flu: negative  UA: mucous present (A)  CBC: WBC 11.7 (H), HGB 15.6,   BMP: Cl 110 (H), glucose 128 (H) o/w WNL (Creatinine 0.84)    Interventions:  NS 1L IV Bolus   Prochlorperazine 10 mg IV  Diphenhydramine 25 mg IV    Emergency Department Course:  Nursing notes and vitals reviewed.  I performed an exam of the patient as documented above.   I discussed the treatment plan with the patient. They expressed understanding of this plan and consented to discharge. They will be discharged home with instructions for care and follow up. In addition, the patient will return to the emergency department if their symptoms persist, worsen, if new symptoms arise or if  there is any concern.  All questions were answered.    I personally reviewed the imaging and lab results with the Patient and answered all related questions prior to discharge.  Impression & Plan      Medical Decision Making:  This patient presented to the Emergency Department with nausea and vomiting.  The clinical exam today is non-specific and non-focal and non-surgical.  The laboratory testing has returned normal.  Imaging is not indicated as there is no focal abdominal pain and pt is not worrisome appearing in any way.  The exact etiology of the nausea is not clear.  The differential diagnosis of nausea includes:  Bowel Obstruction, Ulcer, Ischemia, Cholecystitis, Diverticulitis, Pancreatitis, UTI, Pyelonephritis, Enteritis/Colitis, amongst many other etiologies.  The sequela of vomiting includes electrolyte abnormalities and dehydration.  Fluids were given and labs were normal.  The history, physical exam, and results detect no life threatening cause at this time, nor do they indicate the patient is currently suffering from one of the previously mentioned conditions.  Unfortunately a clear exam and results today do not ensure freedom from a severe disease process in the future-- even within hours, or the possibility that there is a dangerous process currently at work but currently undetected or undiagnosed.  For this reason the patient is advised to seek immediate re-evaluation in the ED if there is a worsening of the condition, and to be seen by a more consistent care-giver, such as their PMD, if the symptoms persist more than one day.  These instructions were clearly stated and were repeated back to me by a competent patient who agreed to them.      Diagnosis:    ICD-10-CM    1. Nausea and vomiting, intractability of vomiting not specified, unspecified vomiting type R11.2      Disposition:   Discharged     Discharge Medications:     Review of your medicines      UNREVIEWED medicines. Ask your doctor about  these medicines      Dose / Directions   acyclovir 800 MG tablet  Commonly known as:  ZOVIRAX  Used for:  Herpes simplex virus (HSV) infection      For outbreaks  Quantity:  24 tablet  Refills:  1     albuterol 108 (90 Base) MCG/ACT inhaler  Commonly known as:  PROAIR HFA  Used for:  Mild persistent asthma without complication      Dose:  1-2 puff  Inhale 1-2 puffs into the lungs every 4 hours as needed  Quantity:  1 Inhaler  Refills:  1     ASPIRIN NOT PRESCRIBED  Commonly known as:  INTENTIONAL      continuous prn for other Antiplatelet medication not prescribed intentionally due to Refusal by patient and Not indicated based on age  Refills:  0     atorvastatin 20 MG tablet  Commonly known as:  LIPITOR  Used for:  Mixed hyperlipidemia      Dose:  20 mg  Take 1 tablet (20 mg) by mouth daily  Quantity:  90 tablet  Refills:  3     budesonide 180 MCG/ACT inhaler  Commonly known as:  PULMICORT FLEXHALER  Used for:  Mild persistent asthma without complication      Dose:  1 puff  Inhale 1 puff into the lungs 2 times daily  Quantity:  2 Inhaler  Refills:  3     Lutein 10 MG Tabs      Refills:  0     metroNIDAZOLE 0.75 % external cream  Commonly known as:  METROCREAM      Refills:  0     MULTI-vitamin  /IRON Tabs      1 TABLET DAILY  Refills:  0     ranitidine 300 MG tablet  Commonly known as:  ZANTAC  Used for:  Gastroesophageal reflux disease, esophagitis presence not specified      Dose:  300 mg  Take 1 tablet (300 mg) by mouth At Bedtime  Quantity:  90 tablet  Refills:  3     triamterene-HCTZ 37.5-25 MG tablet  Commonly known as:  MAXZIDE-25  Used for:  Essential hypertension, benign      Dose:  1 tablet  Take 1 tablet by mouth daily  Quantity:  90 tablet  Refills:  1        START taking      Dose / Directions   ondansetron 4 MG ODT tab  Commonly known as:  ZOFRAN ODT      Dose:  4 mg  Take 1 tablet (4 mg) by mouth every 6 hours as needed  Quantity:  10 tablet  Refills:  0           Where to get your medicines       Some of these will need a paper prescription and others can be bought over the counter. Ask your nurse if you have questions.    Bring a paper prescription for each of these medications    ondansetron 4 MG ODT tab         Scribe Disclosure:  I, Edward Reneeabel, am serving as a scribe at 12:48 PM on 1/27/2019 to document services personally performed by Kevin Rendon MD, based on my observations and the provider's statements to me.   [facility] EMERGENCY DEPARTMENT       Kevin Rendon MD  01/27/19 [id number]

## 2019-01-27 NOTE — ED AVS SNAPSHOT
[facility] Emergency Department  [address]  [facility] [address]  Phone:  [phone]  Fax:  [phone]                                    Priscilla Linda   MRN: [id number]    Department:  [facility] Emergency Department   Date of Visit:  1/27/2019           After Visit Summary Signature Page    I have received my discharge instructions, and my questions have been answered. I have discussed any challenges I see with this plan with the nurse or doctor.    ..........................................................................................................................................  Patient/Patient Representative Signature      ..........................................................................................................................................  Patient Representative Print Name and Relationship to Patient    ..................................................               ................................................  Date                                   Time    ..........................................................................................................................................  Reviewed by Signature/Title    ...................................................              ..............................................  Date                                               Time          22EPIC Rev 08/18

## 2019-01-27 NOTE — ED TRIAGE NOTES
Pt with vomiting since Thursday night along with dizziness. Went to  yesterday and states she had lab draw and 2 liters of fluid given. Took Zofran today and still feeling dizzy and vomiting. ABCs intact, alert and oriented X3.

## 2019-01-28 LAB — INTERPRETATION ECG - MUSE: NORMAL

## 2019-01-31 ENCOUNTER — OFFICE VISIT (OUTPATIENT)
Dept: FAMILY MEDICINE | Facility: CLINIC | Age: 69
End: 2019-01-31

## 2019-01-31 VITALS
DIASTOLIC BLOOD PRESSURE: 86 MMHG | WEIGHT: 166 LBS | BODY MASS INDEX: 29.41 KG/M2 | HEIGHT: 63 IN | OXYGEN SATURATION: 95 % | HEART RATE: 83 BPM | TEMPERATURE: 98.6 F | SYSTOLIC BLOOD PRESSURE: 130 MMHG

## 2019-01-31 DIAGNOSIS — M19.072 PRIMARY OSTEOARTHRITIS OF LEFT FOOT: ICD-10-CM

## 2019-01-31 DIAGNOSIS — I10 BENIGN ESSENTIAL HYPERTENSION: ICD-10-CM

## 2019-01-31 DIAGNOSIS — Z01.818 PRE-OP EXAM: Primary | ICD-10-CM

## 2019-01-31 PROCEDURE — 99214 OFFICE O/P EST MOD 30 MIN: CPT | Performed by: FAMILY MEDICINE

## 2019-01-31 RX ORDER — LISINOPRIL 5 MG/1
5 TABLET ORAL DAILY
Qty: 30 TABLET | Refills: 3 | Status: SHIPPED | OUTPATIENT
Start: 2019-01-31 | End: 2019-03-29

## 2019-01-31 ASSESSMENT — MIFFLIN-ST. JEOR: SCORE: 1253.13

## 2019-01-31 NOTE — PATIENT INSTRUCTIONS
Stop triamterene and start lisinopril after your eating normally after surgery    Recheck blood pressure and creatinine 2-4 weeks later

## 2019-01-31 NOTE — PROGRESS NOTES
[facility]  [address]  [address]  [facility] [address]  [phone]  Dept: [phone]    PRE-OP EVALUATION:  Today's date: 2019    Priscilla Linda (: 1950) presents for pre-operative evaluation assessment as requested by Dr. Wen.  She requires evaluation and anesthesia risk assessment prior to undergoing surgery/procedure for treatment of arthritic joint .    Proposed Surgery/ Procedure: Arthritic cleaning, bone fusion with bone graph  Date of Surgery/ Procedure: 19  Time of Surgery/ Procedure: to be determined-will be in   Hospital/Surgical Facility: [facility]  Fax number for surgical facility: [phone] or [phone]  Primary Physician: Beatrice Benz  Type of Anesthesia Anticipated: Block around ankle with relaxant     Patient has a Health Care Directive or Living Will:  Yes    1. NO - Do you have a history of heart attack, stroke, stent, bypass or surgery on an artery in the head, neck, heart or legs?  2. NO - Do you ever have any pain or discomfort in your chest?  3. NO - Do you have a history of  Heart Failure?  4. NO - Are you troubled by shortness of breath when: walking on the level, up a slight hill or at night?  5. NO - Do you currently have a cold, bronchitis or other respiratory infection?  6. NO - Do you have a cough, shortness of breath or wheezing? Asthma symptoms controlled with Pulmicort  7. NO - Do you sometimes get pains in the calves of your legs when you walk?  8. YES - DO YOU OR ANYONE IN YOUR FAMILY HAVE PREVIOUS HISTORY OF BLOOD CLOTS?  Brother has a history of DVT-   9. NO - Do you or does anyone in your family have a serious bleeding problem such as prolonged bleeding following surgeries or cuts?  10. NO - Have you ever had problems with anemia or been told to take iron pills?  11. NO - Have you had any abnormal blood loss such as black, tarry or bloody stools, or abnormal vaginal  bleeding?  12. NO - Have you ever had a blood transfusion?  13. NO - Have you or any of your relatives ever had problems with anesthesia?  14. NO - Do you have sleep apnea, excessive snoring or daytime drowsiness?  15. NO - Do you have any prosthetic heart valves?  16. NO - Do you have prosthetic joints?  17. NO - Is there any chance that you may be pregnant?      HPI:     HPI related to upcoming procedure:  Left foot pain, arthritic joint    MEDICAL HISTORY:     Patient Active Problem List    Diagnosis Date Noted     Ganglion cyst of wrist, left 10/10/2018     Priority: Medium     Health Care Home 10/17/2012     Priority: Medium     State Tier Level:  Tier 2  Status:  NA   Care Coordinator:      See Letters for [facility] Care Plan           ACP (advance care planning) 03/21/2012     Priority: Medium     Advance Care Planning 4/25/2016: ACP Review of Chart / Resources Provided:  Reviewed chart for advance care plan.  Priscilla Linda has no plan or code status on file however states presence of ACP document. Copy requested. Confirmed code status reflects current choices pending receipt of document/advance care plan review.  Confirmed/documented legally designated decision makers.  Added by Gifty Aguiar                 Essential hypertension, benign 03/21/2012     Priority: Medium     Asthma, mild persistent 02/12/2010     Priority: Medium     Mixed hyperlipidemia 02/12/2010     Priority: Medium     Ocular migraine 02/12/2010     Priority: Medium     Problem list name updated by automated process. Provider to review       Herpes simplex virus (HSV) infection 02/12/2010     Priority: Medium     Problem list name updated by automated process. 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    Hx of Clomid       Past Surgical History:   Procedure Laterality Date     C  NONSPECIFIC PROCEDURE  1999    ganglion cyst     C TOTAL ABDOM HYSTERECTOMY  1995    prolapse, marshal chay/ BSO     CATARACT IOL, RT/LT  2007    Right and Left     COLONOSCOPY  6/2013    polyps, repeat in 3 years     COLONOSCOPY  8/2016    tub adenoma/ repeat 5 years     HC COLONOSCOPY THRU STOMA, DIAGNOSTIC  2003,     hx of iron defic/ two normal colonoscopies     TUBAL LIGATION  1983     UPPER GI ENDOSCOPY  2003    hiatal hernia     Current Outpatient Medications   Medication Sig Dispense Refill     acyclovir (ZOVIRAX) 800 MG tablet For outbreaks 24 tablet 1     albuterol (PROAIR HFA) 108 (90 BASE) MCG/ACT Inhaler Inhale 1-2 puffs into the lungs every 4 hours as needed 1 Inhaler 1     ASPIRIN NOT PRESCRIBED (INTENTIONAL) continuous prn for other Antiplatelet medication not prescribed intentionally due to Refusal by patient and Not indicated based on age       atorvastatin (LIPITOR) 20 MG tablet Take 1 tablet (20 mg) by mouth daily 90 tablet 3     budesonide (PULMICORT FLEXHALER) 180 MCG/ACT inhaler Inhale 1 puff into the lungs 2 times daily 2 Inhaler 3     Lutein 10 MG TABS        MULTI-VITAMIN/IRON OR TABS 1 TABLET DAILY       omeprazole (PRILOSEC) 20 MG DR capsule        triamterene-hydrochlorothiazide (MAXZIDE-25) 37.5-25 MG per tablet Take 1 tablet by mouth daily 90 tablet 1     OTC products: no recent use of OTC ASA, NSAIDS or Steroids  She has stopped her vitamins     No Known Allergies   Latex Allergy: NO    Social History     Tobacco Use     Smoking status: Never Smoker     Smokeless tobacco: Never Used   Substance Use Topics     Alcohol use: Yes     Alcohol/week: 0.0 oz     Comment: very occ     History   Drug Use No       REVIEW OF SYSTEMS:   CONSTITUTIONAL: NEGATIVE for fever, chills, change in weight  ENT/MOUTH: NEGATIVE for ear, mouth and throat problems  RESP: NEGATIVE for significant cough or SOB- takes a daily inhaler  CV: NEGATIVE for chest pain, palpitations or peripheral edema  GI: nausea  "and vomiting has resolved- seen in ER 2019  MUSCULOSKELETAL: Left foot pain-  PSYCHIATRIC: NEGATIVE for changes in mood or affect    EXAM:   /86 (BP Location: Left arm, Patient Position: Sitting, Cuff Size: Adult Regular)   Pulse 83   Temp 98.6  F (37  C) (Oral)   Ht 1.594 m (5' 2.75\")   Wt 75.3 kg (166 lb)   LMP  (LMP Unknown)   SpO2 95%   BMI 29.64 kg/m    GENERAL APPEARANCE: healthy, alert and no distress  HENT: ear canals and TM's normal and nose and mouth without ulcers or lesions  RESP: lungs clear to auscultation - no rales, rhonchi or wheezes  CV: regular rate and rhythm, no murmur, click or rub   ABDOMEN: soft, nontender, no HSM or masses and bowel sounds normal  NEURO: Normal strength and tone, sensory exam grossly normal, mentation intact and speech normal    DIAGNOSTICS:   EK2019   Normal Sinus Rhythm, Right Bundle Branch Block, unchanged from previous tracings    Hemoglobin   Date Value Ref Range Status   2019 15.6 11.7 - 15.7 g/dL Final   ]  Potassium   Date Value Ref Range Status   2019 3.4 3.4 - 5.3 mmol/L Final     Comment:     Specimen slightly hemolyzed, potassium may be falsely elevated     Lab Results   Component Value Date    CR 0.84 2019         Recent Labs   Lab Test 19  1156 10/10/18  1314 10/10/18  1243   HGB 15.6  --  15.6     --  343    140  --    POTASSIUM 3.4 4.0  --    CR 0.84 0.89  --         IMPRESSION:   Reason for surgery/procedure: left foot arthritis- chronic pain    The proposed surgical procedure is considered INTERMEDIATE risk.    REVISED CARDIAC RISK INDEX  The patient has the following serious cardiovascular risks for perioperative complications such as (MI, PE, VFib and 3  AV Block):  No serious cardiac risks  INTERPRETATION: 0 risks: Class I (very low risk - 0.4% complication rate)    The patient has the following additional risks for perioperative complications:  No identified additional risks      ICD-10-CM  "   1. Pre-op exam Z01.818    2. Primary osteoarthritis of left foot M19.072        RECOMMENDATIONS:       --Patient is to take pulmicort on the morning surgery: no other prescription medications to be taken on the morning of surgery  --Patient will discontinue triamterene the morning of surgery- start lisinopril 5 mg after surgery  Follow up blood pressure one month after initiating lisinopril    APPROVAL GIVEN to proceed with proposed procedure, without further diagnostic evaluation       Signed Electronically by: Beatrice Benz MD    Copy of this evaluation report is provided to requesting physician.    [facility] Preop Guidelines    Revised Cardiac Risk Index

## 2019-02-21 ENCOUNTER — TRANSFERRED RECORDS (OUTPATIENT)
Dept: FAMILY MEDICINE | Facility: CLINIC | Age: 69
End: 2019-02-21

## 2019-02-25 ENCOUNTER — OFFICE VISIT (OUTPATIENT)
Dept: FAMILY MEDICINE | Facility: CLINIC | Age: 69
End: 2019-02-25

## 2019-02-25 VITALS
RESPIRATION RATE: 20 BRPM | DIASTOLIC BLOOD PRESSURE: 104 MMHG | HEART RATE: 81 BPM | OXYGEN SATURATION: 99 % | TEMPERATURE: 97.5 F | SYSTOLIC BLOOD PRESSURE: 174 MMHG

## 2019-02-25 DIAGNOSIS — Z98.890 STATUS POST FOOT JOINT SURGERY: ICD-10-CM

## 2019-02-25 DIAGNOSIS — I10 ESSENTIAL HYPERTENSION: Primary | ICD-10-CM

## 2019-02-25 PROCEDURE — 36415 COLL VENOUS BLD VENIPUNCTURE: CPT | Performed by: FAMILY MEDICINE

## 2019-02-25 PROCEDURE — 90732 PPSV23 VACC 2 YRS+ SUBQ/IM: CPT | Performed by: FAMILY MEDICINE

## 2019-02-25 PROCEDURE — 99213 OFFICE O/P EST LOW 20 MIN: CPT | Performed by: FAMILY MEDICINE

## 2019-02-25 PROCEDURE — G0009 ADMIN PNEUMOCOCCAL VACCINE: HCPCS | Performed by: FAMILY MEDICINE

## 2019-02-25 RX ORDER — LISINOPRIL/HYDROCHLOROTHIAZIDE 10-12.5 MG
1 TABLET ORAL DAILY
Qty: 90 TABLET | Refills: 1 | Status: SHIPPED | OUTPATIENT
Start: 2019-02-25 | End: 2019-03-29

## 2019-02-25 NOTE — PROGRESS NOTES
SUBJECTIVE:   Priscilla Linda is a 68 year old female who presents to clinic today for the following health issues:    Because of low potasium, triamterene for the treatment of her hypertension was changed to Lisinopril 5 mg  She is home recovering from foot surgery, and checking her blood pressures- running higher    Hypertension Follow-up: blood pressure has been running high      Outpatient blood pressures are being checked at home.  Results are 160's/.    Low Salt Diet: no added salt      Amount of exercise or physical activity: sedentary with surgery recovery- roll about     Problems taking medications regularly: No    Medication side effects: none    Diet: low salt    Problem list and histories reviewed & adjusted, as indicated.  Additional history: as documented    Patient Active Problem List   Diagnosis     Asthma, mild persistent     Mixed hyperlipidemia     Ocular migraine     Herpes simplex virus (HSV) infection     ACP (advance care planning)     Essential hypertension, benign     Health Care Home     Ganglion cyst of wrist, left     Past Surgical History:   Procedure Laterality Date     C NONSPECIFIC PROCEDURE      ganglion cyst     C TOTAL ABDOM HYSTERECTOMY      prolapse, marshal chay/ BSO     CATARACT IOL, RT/LT      Right and Left     COLONOSCOPY  2013    polyps, repeat in 3 years     COLONOSCOPY  2016    tub adenoma/ repeat 5 years     FOOT SURGERY Right 10/2018    fusion      HC COLONOSCOPY THRU STOMA, DIAGNOSTIC  ,     hx of iron defic/ two normal colonoscopies     TUBAL LIGATION       UPPER GI ENDOSCOPY      hiatal hernia       Social History     Tobacco Use     Smoking status: Never Smoker     Smokeless tobacco: Never Used   Substance Use Topics     Alcohol use: Yes     Alcohol/week: 0.0 oz     Comment: very occ     Family History   Problem Relation Age of Onset     Diabetes Father          age 75 complications     Hypertension Mother          mild     Diabetes Sister         type 1     Diabetes Brother         aodm in his forties     Blood Disease No family hx of         family hx of hemochromatosis         Current Outpatient Medications   Medication Sig Dispense Refill     acyclovir (ZOVIRAX) 800 MG tablet For outbreaks 24 tablet 1     albuterol (PROAIR HFA) 108 (90 BASE) MCG/ACT Inhaler Inhale 1-2 puffs into the lungs every 4 hours as needed 1 Inhaler 1     ASPIRIN NOT PRESCRIBED (INTENTIONAL) continuous prn for other Antiplatelet medication not prescribed intentionally due to Refusal by patient and Not indicated based on age       atorvastatin (LIPITOR) 20 MG tablet Take 1 tablet (20 mg) by mouth daily 90 tablet 3     budesonide (PULMICORT FLEXHALER) 180 MCG/ACT inhaler Inhale 1 puff into the lungs 2 times daily 2 Inhaler 3     lisinopril (PRINIVIL/ZESTRIL) 5 MG tablet Take 1 tablet (5 mg) by mouth daily 30 tablet 3     lisinopril-hydrochlorothiazide (PRINZIDE/ZESTORETIC) 10-12.5 MG tablet Take 1 tablet by mouth daily 90 tablet 1     Lutein 10 MG TABS        MULTI-VITAMIN/IRON OR TABS 1 TABLET DAILY       omeprazole (PRILOSEC) 20 MG DR capsule        triamterene-hydrochlorothiazide (MAXZIDE-25) 37.5-25 MG per tablet Take 1 tablet by mouth daily (Patient not taking: Reported on 2/25/2019) 90 tablet 1       Reviewed and updated as needed this visit by clinical staff       Reviewed and updated as needed this visit by Provider         ROS:  Constitutional, HEENT, cardiovascular, pulmonary, gi and gu systems are negative, except as otherwise noted.  Mild headache    OBJECTIVE:     BP (!) 174/104 (BP Location: Right arm, Patient Position: Sitting, Cuff Size: Adult Regular)   Pulse 81   Temp 97.5  F (36.4  C) (Oral)   Resp 20   LMP  (LMP Unknown)   SpO2 99%   There is no height or weight on file to calculate BMI.   168/106  No acute distress  Regular rate and  rhythm. S1 and S2 normal, no murmurs, clicks, gallops or rubs. No edema or JVD. Chest is  clear; no wheezes or rales.    Foot: mild separation of wound- no sign of infection    Diagnostic Test Results:  Results for orders placed or performed in visit on 02/25/19 (from the past 24 hour(s))   BASIC METABOLIC PANEL ([facility])   Result Value Ref Range    Glucose 96 65 - 99 mg/dL    Urea Nitrogen 17 7 - 25 mg/dL    Creatinine 0.77 0.50 - 0.99 mg/dL    GFR Estimate 80 > OR = 60 mL/min/1.73m2    EGFR African American 93 > OR = 60 mL/min/1.73m2    BUN/Creatinine Ratio NOT APPLICABLE 6 - 22 (calc)    Sodium 141 135 - 146 mmol/L    Potassium 4.1 3.5 - 5.3 mmol/L    Chloride 103 98 - 110 mmol/L    Carbon Dioxide 26 20 - 32 mmol/L    Calcium 10.5 (H) 8.6 - 10.4 mg/dL       ASSESSMENT/PLAN:     Problem List Items Addressed This Visit     None      Visit Diagnoses     Essential hypertension    -  Primary    Relevant Medications    lisinopril-hydrochlorothiazide (PRINZIDE/ZESTORETIC) 10-12.5 MG tablet    Other Relevant Orders    BASIC METABOLIC PANEL ([facility]) (Completed)    VENOUS COLLECTION (Completed)       first creatinine after starting lisinopril    PLAN:  Increase lisinopril to 10 mg and add hydrochlorothiazide 12.5 mg daily    Recheck blood pressure in one week  Monitor potassium- reviewed foods juliette in potassium    Beatrice Benz MD  [facility]

## 2019-02-25 NOTE — NURSING NOTE
Patient is here for medication check today for blood pressure. She states that her readings have been higher lately and been staying consistently higher. Patient states that she was just changed to a different medication recently but has had higher readings and headaches since then.     Pre-visit Screening:  Immunizations:  not up to date - due for pneumo 23  Colonoscopy:  is up to date  Mammogram: is up to date  Asthma Action Test/Plan:  NA  PHQ9:  NA  GAD7:  NA  Questioned patient about current smoking habits Pt. has never smoked.  Ok to leave detailed message on voice mail for today's visit only yes, phone # [phone]

## 2019-02-26 LAB
BUN SERPL-MCNC: 17 MG/DL (ref 7–25)
BUN/CREATININE RATIO: ABNORMAL (CALC) (ref 6–22)
CALCIUM SERPL-MCNC: 10.5 MG/DL (ref 8.6–10.4)
CHLORIDE SERPLBLD-SCNC: 103 MMOL/L (ref 98–110)
CO2 SERPL-SCNC: 26 MMOL/L (ref 20–32)
CREAT SERPL-MCNC: 0.77 MG/DL (ref 0.5–0.99)
EGFR AFRICAN AMERICAN - QUEST: 93 ML/MIN/1.73M2
GFR SERPL CREATININE-BSD FRML MDRD: 80 ML/MIN/1.73M2
GLUCOSE - QUEST: 96 MG/DL (ref 65–99)
POTASSIUM SERPL-SCNC: 4.1 MMOL/L (ref 3.5–5.3)
SODIUM SERPL-SCNC: 141 MMOL/L (ref 135–146)

## 2019-03-01 ENCOUNTER — OFFICE VISIT (OUTPATIENT)
Dept: FAMILY MEDICINE | Facility: CLINIC | Age: 69
End: 2019-03-01

## 2019-03-01 VITALS
OXYGEN SATURATION: 99 % | SYSTOLIC BLOOD PRESSURE: 142 MMHG | DIASTOLIC BLOOD PRESSURE: 84 MMHG | TEMPERATURE: 98.7 F | HEART RATE: 75 BPM

## 2019-03-01 DIAGNOSIS — I10 BENIGN ESSENTIAL HYPERTENSION: Primary | ICD-10-CM

## 2019-03-01 PROCEDURE — 36415 COLL VENOUS BLD VENIPUNCTURE: CPT | Performed by: FAMILY MEDICINE

## 2019-03-01 PROCEDURE — 99212 OFFICE O/P EST SF 10 MIN: CPT | Performed by: FAMILY MEDICINE

## 2019-03-01 NOTE — NURSING NOTE
Priscilla is here for a bp med check.          Pre-visit Screening:  Immunizations:  up to date  Colonoscopy:  is up to date  Mammogram: is up to date  Asthma Action Test/Plan:  ALMA  PHQ9:  NA  GAD7:  NA  Questioned patient about current smoking habits Pt. has never smoked.  Ok to leave detailed message on voice mail for today's visit only Yes, phone # [phone]

## 2019-03-01 NOTE — PROGRESS NOTES
SUBJECTIVE:  68 year old female presents with the following concern:    Recheck of her blood pressure after initiating hydrochlorothiazide and increasing her dose of lisinopril  See visit 2/25/019    Blood pressures have been running normal at home  Her headache is gone  History of low potassium on triamterene-    Assessment   /84 (BP Location: Left arm, Patient Position: Sitting, Cuff Size: Adult Large)   Pulse 75   Temp 98.7  F (37.1  C) (Oral)   LMP  (LMP Unknown)   SpO2 99%   132/84 today in the office- second BP    Assessment   Hypertension improved    PLAN:  Continue her current medications-  Low dose lisinopril plus HCTZ  Repeat basic profile in three weeks, lab only appointment

## 2019-03-20 DIAGNOSIS — I10 BENIGN ESSENTIAL HYPERTENSION: ICD-10-CM

## 2019-03-20 PROCEDURE — 36415 COLL VENOUS BLD VENIPUNCTURE: CPT | Performed by: FAMILY MEDICINE

## 2019-03-21 ENCOUNTER — TRANSFERRED RECORDS (OUTPATIENT)
Dept: FAMILY MEDICINE | Facility: CLINIC | Age: 69
End: 2019-03-21

## 2019-03-21 LAB
BUN SERPL-MCNC: 18 MG/DL (ref 7–25)
BUN/CREATININE RATIO: NORMAL (CALC) (ref 6–22)
CALCIUM SERPL-MCNC: 10 MG/DL (ref 8.6–10.4)
CHLORIDE SERPLBLD-SCNC: 102 MMOL/L (ref 98–110)
CO2 SERPL-SCNC: 26 MMOL/L (ref 20–32)
CREAT SERPL-MCNC: 0.85 MG/DL (ref 0.5–0.99)
EGFR AFRICAN AMERICAN - QUEST: 82 ML/MIN/1.73M2
GFR SERPL CREATININE-BSD FRML MDRD: 71 ML/MIN/1.73M2
GLUCOSE - QUEST: 93 MG/DL (ref 65–99)
POTASSIUM SERPL-SCNC: 3.9 MMOL/L (ref 3.5–5.3)
SODIUM SERPL-SCNC: 140 MMOL/L (ref 135–146)

## 2019-03-29 ENCOUNTER — OFFICE VISIT (OUTPATIENT)
Dept: FAMILY MEDICINE | Facility: CLINIC | Age: 69
End: 2019-03-29

## 2019-03-29 VITALS
HEART RATE: 72 BPM | WEIGHT: 170 LBS | DIASTOLIC BLOOD PRESSURE: 94 MMHG | TEMPERATURE: 98.4 F | BODY MASS INDEX: 30.35 KG/M2 | OXYGEN SATURATION: 98 % | SYSTOLIC BLOOD PRESSURE: 152 MMHG

## 2019-03-29 DIAGNOSIS — G44.1 OTHER VASCULAR HEADACHE: ICD-10-CM

## 2019-03-29 DIAGNOSIS — I10 ESSENTIAL HYPERTENSION, BENIGN: ICD-10-CM

## 2019-03-29 PROCEDURE — 99213 OFFICE O/P EST LOW 20 MIN: CPT | Performed by: FAMILY MEDICINE

## 2019-03-29 RX ORDER — METOPROLOL TARTRATE 25 MG/1
25 TABLET, FILM COATED ORAL 2 TIMES DAILY
Qty: 60 TABLET | Refills: 1 | Status: SHIPPED | OUTPATIENT
Start: 2019-03-29 | End: 2019-04-29

## 2019-03-29 RX ORDER — TRIAMTERENE/HYDROCHLOROTHIAZID 37.5-25 MG
1 TABLET ORAL DAILY
Qty: 90 TABLET | Refills: 1 | Status: SHIPPED | OUTPATIENT
Start: 2019-03-29 | End: 2019-04-29

## 2019-03-29 NOTE — NURSING NOTE
Patient is here to review medications today for blood pressure. She is now taking lisinopril hydrochlorothiazide and not the Triamterene hydrochlorothiazide.     Pre-visit Screening:  Immunizations:  up to date  Colonoscopy:  is up to date  Mammogram: is up to date  Asthma Action Test/Plan:  NA  PHQ9:  NA  GAD7:  NA  Questioned patient about current smoking habits Pt. has never smoked.  Ok to leave detailed message on voice mail for today's visit only Yes, phone # [phone]

## 2019-03-29 NOTE — PROGRESS NOTES
SUBJECTIVE:  68 year old female presents with the following concern:    Feels noise in her head- ?? Pulsating since changing her blood pressure medication-  Her father  of a stroke and she is quite anxious about her symptoms and blood pressure.    Offered neurologist consult    Father  from a stroke    She has not felt well since her orthopedic surgery    OBJECTIVE:  BP (!) 152/94 (BP Location: Left arm, Patient Position: Sitting, Cuff Size: Adult Regular)   Pulse 72   Temp 98.4  F (36.9  C) (Oral)   Wt 77.1 kg (170 lb)   LMP  (LMP Unknown)   SpO2 98%   BMI 30.35 kg/m    Regular rate and  rhythm. S1 and S2 normal, no murmurs, clicks, gallops or rubs. No edema or JVD. Chest is clear; no wheezes or rales.  She appears anxious  No focal neuro deficets (reflexes, motor, sensory, cranial nerves, gait, mentation, ) is normal.      Assessment   Hypertension, not controlled on her current medications  Pulsating headache    PLAN:  She wishes to restart maxzide  discontinue lisinopril  Low dose metoprolol  Monitor blood pressure  Call or return to clinic prn if these symtoms worsen, fail to improve as anticipated, or if new symptoms develop.    Recheck blood pressure in one month

## 2019-04-03 ENCOUNTER — DOCUMENTATION ONLY (OUTPATIENT)
Dept: OTHER | Facility: CLINIC | Age: 69
End: 2019-04-03

## 2019-04-05 ENCOUNTER — MYC MEDICAL ADVICE (OUTPATIENT)
Dept: FAMILY MEDICINE | Facility: CLINIC | Age: 69
End: 2019-04-05

## 2019-04-05 NOTE — TELEPHONE ENCOUNTER
Dr. Benz patient sent a MY CHART message asking for the referral for Neurology RE noise in her head.     Please complete / sign the pending referral ( under 3/29/19 encounter ). I will send patient a MY CHART message with information for     [facility]  [address] [place] [address]  [phone] -- appt line  [phone] -- fax    Thank you   Carrie

## 2019-04-05 NOTE — TELEPHONE ENCOUNTER
From: Priscilla Linda  Sent: 4/5/2019 9:00 AM CDT  Subject: Referral Request    Priscilla HSARIF Linda would like to request a referral.  Reason: UPDATE  Requested provider: Dr. Benz  Comment:  First of all I have checked my BP a couple of times and it is much better. 131/82 119/68 145 /84 so I am happy about that. When I saw you last Friday you offered a referral to a neurologist and I said no. I think maybe I should do that as the noise in my head is still very bothersome and I don't know if it is tinnitus or something else. Some times it seems to be in sync with my pulse and it is so bothersome. I try to think when this started and it seems to have been when my BP was up. Oh well let me know

## 2019-04-26 ENCOUNTER — TRANSFERRED RECORDS (OUTPATIENT)
Dept: FAMILY MEDICINE | Facility: CLINIC | Age: 69
End: 2019-04-26

## 2019-04-29 ENCOUNTER — OFFICE VISIT (OUTPATIENT)
Dept: FAMILY MEDICINE | Facility: CLINIC | Age: 69
End: 2019-04-29

## 2019-04-29 VITALS
WEIGHT: 169 LBS | HEART RATE: 72 BPM | BODY MASS INDEX: 30.18 KG/M2 | TEMPERATURE: 97.5 F | OXYGEN SATURATION: 97 % | SYSTOLIC BLOOD PRESSURE: 128 MMHG | DIASTOLIC BLOOD PRESSURE: 78 MMHG

## 2019-04-29 DIAGNOSIS — I10 ESSENTIAL HYPERTENSION, BENIGN: ICD-10-CM

## 2019-04-29 DIAGNOSIS — E78.5 HYPERLIPIDEMIA LDL GOAL <100: ICD-10-CM

## 2019-04-29 DIAGNOSIS — H93.13 TINNITUS, BILATERAL: ICD-10-CM

## 2019-04-29 PROCEDURE — 99213 OFFICE O/P EST LOW 20 MIN: CPT | Performed by: FAMILY MEDICINE

## 2019-04-29 RX ORDER — METOPROLOL TARTRATE 25 MG/1
25 TABLET, FILM COATED ORAL 2 TIMES DAILY
Qty: 180 TABLET | Refills: 3 | Status: SHIPPED | OUTPATIENT
Start: 2019-04-29 | End: 2020-01-30

## 2019-04-29 RX ORDER — TRIAMTERENE/HYDROCHLOROTHIAZID 37.5-25 MG
1 TABLET ORAL DAILY
Qty: 90 TABLET | Refills: 1 | Status: SHIPPED | OUTPATIENT
Start: 2019-04-29 | End: 2020-01-30

## 2019-04-29 NOTE — NURSING NOTE
Patient is herefor blood pressure recheck after starting metoprolol.    Pre-visit Screening:  Immunizations:  up to date  Colonoscopy:  is up to date  Mammogram: is up to date  Asthma Action Test/Plan:  NA  PHQ9:  PHQ-2 done today   GAD7:  No concerns  Questioned patient about current smoking habits Pt. has never smoked.  Ok to leave detailed message on voice mail for today's visit only Yes, phone # [phone]

## 2019-04-29 NOTE — PROGRESS NOTES
SUBJECTIVE:  68 year old female presents with the following concern:    Recheck blood pressure after a change in medications a month ago:  restarted maxzide  discontinued lisinopril  Started low dose metoprolol    She feels better with the change in medications.  Only side effect: Cannot get pulse over 90 when she exercises    Headache is better- doesn't feel pressure anymore  Has tinnitus- this is her most annoying symptom (? Left worse than right)    Has an appointment with neurology  Recommend ENT evaluation as well    Recorded blood pressures from home:  131/82  119/68  145/84  120/63  138/76  161/86  147/86    OBJECTIVE:  /78 (BP Location: Right arm, Patient Position: Sitting, Cuff Size: Adult Regular)   Pulse 72   Temp 97.5  F (36.4  C) (Oral)   Wt 76.7 kg (169 lb)   LMP  (LMP Unknown)   SpO2 97%   BMI 30.18 kg/m    142/86 on repeat today  External ears  and canals clear bilaterally. TM's normal bilaterally. Nose normal without lesions or discharge. Oropharynx normal. Neck supple without palpable adenopathy.  Regular rate and  rhythm. S1 and S2 normal, no murmurs, clicks, gallops or rubs. No edema or JVD. Chest is clear; no wheezes or rales.    Assessment    (H93.13) Tinnitus, bilateral  Comment:    Plan: OTOLARYNGOLOGY REFERRAL            (I10) Essential hypertension, benign  Comment: improved- continue to monitor  Plan: metoprolol tartrate (LOPRESSOR) 25 MG tablet,         triamterene-HCTZ (MAXZIDE-25) 37.5-25 MG tablet            (E78.5) Hyperlipidemia LDL goal <100  Comment: she will return for lab only appointment  Plan: Lipid Profile, ALT ([facility]), VENOUS COLLECTION                  PLAN:  Referral to ent for tinnitus    Lipid and alt in July this can be a lab only

## 2019-05-28 ENCOUNTER — TRANSFERRED RECORDS (OUTPATIENT)
Dept: FAMILY MEDICINE | Facility: CLINIC | Age: 69
End: 2019-05-28

## 2019-06-04 ENCOUNTER — TRANSFERRED RECORDS (OUTPATIENT)
Dept: FAMILY MEDICINE | Facility: CLINIC | Age: 69
End: 2019-06-04

## 2019-06-17 ENCOUNTER — TRANSFERRED RECORDS (OUTPATIENT)
Dept: FAMILY MEDICINE | Facility: CLINIC | Age: 69
End: 2019-06-17

## 2019-06-17 DIAGNOSIS — K21.9 GASTROESOPHAGEAL REFLUX DISEASE, ESOPHAGITIS PRESENCE NOT SPECIFIED: ICD-10-CM

## 2019-06-17 NOTE — TELEPHONE ENCOUNTER
Pending Prescriptions:                       Disp   Refills    omeprazole (PRILOSEC) 20 MG DR capsule [P*90 cap*             Sig: TAKE ONE CAPSULE BY MOUTH ONE TIME DAILY     This is in historical  Last ov was 4-2019  Please fax deny or send to   Dona  [phone] (home) NONE (work)

## 2019-06-27 DIAGNOSIS — E78.2 MIXED HYPERLIPIDEMIA: ICD-10-CM

## 2019-06-27 NOTE — TELEPHONE ENCOUNTER
Received incoming refill request for  Pending Prescriptions:                       Disp   Refills    atorvastatin (LIPITOR) 20 MG tablet [Phar*90 tab*2            Sig: TAKE ONE TABLET BY MOUTH ONE TIME DAILY     Patient last had a refill of this medication on 7/15/18 for a year supply. They were last seen in clinic on 4/29/19 where lipids were talked about and labs were done. Routing to Dr. Benz for review.

## 2019-06-27 NOTE — TELEPHONE ENCOUNTER
Standing orders are in epic for fasting lipid profile  I can refill for thirty days and need her to make a lab only appointment- or can she schedule for tomorrow and I can refill for ninety?  Please call

## 2019-06-28 DIAGNOSIS — E78.5 HYPERLIPIDEMIA LDL GOAL <100: ICD-10-CM

## 2019-06-28 PROCEDURE — 84460 ALANINE AMINO (ALT) (SGPT): CPT | Performed by: FAMILY MEDICINE

## 2019-06-28 PROCEDURE — 36415 COLL VENOUS BLD VENIPUNCTURE: CPT | Performed by: FAMILY MEDICINE

## 2019-06-28 PROCEDURE — 80061 LIPID PANEL: CPT | Performed by: FAMILY MEDICINE

## 2019-06-29 LAB
ALT 1742-6: 23 U/L (ref 5–30)
CHOLEST SERPL-MCNC: 278 MG/DL (ref 0–199)
CHOLEST/HDLC SERPL: 3 {RATIO} (ref 0–5)
HDLC SERPL-MCNC: 88 MG/DL (ref 40–150)
LDLC SERPL CALC-MCNC: 148 MG/DL (ref 0–130)
TRIGL SERPL-MCNC: 208 MG/DL (ref 0–149)

## 2019-07-01 RX ORDER — ATORVASTATIN CALCIUM 40 MG/1
40 TABLET, FILM COATED ORAL DAILY
Qty: 90 TABLET | Refills: 0 | Status: SHIPPED | OUTPATIENT
Start: 2019-07-01 | End: 2019-09-16

## 2019-07-02 RX ORDER — ATORVASTATIN CALCIUM 20 MG/1
TABLET, FILM COATED ORAL
Qty: 90 TABLET | Refills: 2 | COMMUNITY
Start: 2019-07-02

## 2019-07-08 ENCOUNTER — TRANSFERRED RECORDS (OUTPATIENT)
Dept: FAMILY MEDICINE | Facility: CLINIC | Age: 69
End: 2019-07-08

## 2019-07-09 ENCOUNTER — OFFICE VISIT (OUTPATIENT)
Dept: FAMILY MEDICINE | Facility: CLINIC | Age: 69
End: 2019-07-09

## 2019-07-09 VITALS
HEART RATE: 68 BPM | SYSTOLIC BLOOD PRESSURE: 112 MMHG | DIASTOLIC BLOOD PRESSURE: 76 MMHG | OXYGEN SATURATION: 96 % | RESPIRATION RATE: 20 BRPM

## 2019-07-09 DIAGNOSIS — J45.30 MILD PERSISTENT ASTHMA WITHOUT COMPLICATION: Primary | ICD-10-CM

## 2019-07-09 DIAGNOSIS — E78.5 HYPERLIPIDEMIA LDL GOAL <130: ICD-10-CM

## 2019-07-09 PROCEDURE — 99214 OFFICE O/P EST MOD 30 MIN: CPT | Performed by: FAMILY MEDICINE

## 2019-07-09 NOTE — PROGRESS NOTES
SUBJECTIVE:  68 year old female presents with the following concerns:    1)Review of recent lipid profile  Willing to increase atorvastatin to 40 mg daily  Repeat fasting labs 10-12 weeks after increasing dose.  Encouraged heart healthy diet and regular exercise   Recently fractured her right ankle- has a walking boot on, but activity is limited     2)   Asthma Follow-Up:   Diagnosed asthmatic ten years ago    Was ACT completed today?    Yes    ACT Total Scores 7/9/2019   ACT TOTAL SCORE -   ASTHMA ER VISITS -   ASTHMA HOSPITALIZATIONS -   ACT TOTAL SCORE (Goal Greater than or Equal to 20) 22   In the past 12 months, how many times did you visit the emergency room for your asthma without being admitted to the hospital? 0   In the past 12 months, how many times were you hospitalized overnight because of your asthma? 0     Used albuterol one time- in the last month  ACT scores 22      How many days per week do you miss taking your asthma controller medication?  0    Please describe any recent triggers for your asthma: Patient is unaware of triggers     Allergy testing completed    Have you had any Emergency Room Visits, Urgent Care Visits, or Hospital Admissions since your last office visit?  No        Amount of exercise or physical activity: limited- see above    Problems taking medications regularly: No    Medication side effects: none  No problems with thrush  Had cataract surgery in mid fifties    Diet: low fat/cholesterol       Patient Active Problem List   Diagnosis     Asthma, mild persistent     Mixed hyperlipidemia     Ocular migraine     Herpes simplex virus (HSV) infection     Essential hypertension, benign     Health Care Home     Ganglion cyst of wrist, left     Past Surgical History:   Procedure Laterality Date     C NONSPECIFIC PROCEDURE  1999    ganglion cyst     C TOTAL ABDOM HYSTERECTOMY  1995    prolapse, marshal chay/ BSO     CATARACT IOL, RT/LT  2007    Right and Left     COLONOSCOPY  6/2013     polyps, repeat in 3 years     COLONOSCOPY  2016    tub adenoma/ repeat 5 years     FOOT SURGERY Right 10/2018    fusion      HC COLONOSCOPY THRU STOMA, DIAGNOSTIC  ,     hx of iron defic/ two normal colonoscopies     TUBAL LIGATION       UPPER GI ENDOSCOPY      hiatal hernia       Social History     Tobacco Use     Smoking status: Never Smoker     Smokeless tobacco: Never Used   Substance Use Topics     Alcohol use: Yes     Alcohol/week: 0.0 oz     Comment: very occ     Family History   Problem Relation Age of Onset     Diabetes Father          age 75 complications     Hypertension Mother         mild     Diabetes Sister         type 1     Diabetes Brother         aodm in his forties     Blood Disease No family hx of         family hx of hemochromatosis         Current Outpatient Medications   Medication Sig Dispense Refill     acyclovir (ZOVIRAX) 800 MG tablet For outbreaks 24 tablet 1     albuterol (PROAIR HFA) 108 (90 BASE) MCG/ACT Inhaler Inhale 1-2 puffs into the lungs every 4 hours as needed 1 Inhaler 1     ASPIRIN NOT PRESCRIBED (INTENTIONAL) continuous prn for other Antiplatelet medication not prescribed intentionally due to Refusal by patient and Not indicated based on age       atorvastatin (LIPITOR) 40 MG tablet Take 1 tablet (40 mg) by mouth daily 90 tablet 0     budesonide (PULMICORT FLEXHALER) 180 MCG/ACT inhaler Inhale 1 puff into the lungs 2 times daily 2 Inhaler 3     metoprolol tartrate (LOPRESSOR) 25 MG tablet Take 1 tablet (25 mg) by mouth 2 times daily 180 tablet 3     MULTI-VITAMIN/IRON OR TABS 1 TABLET DAILY       omeprazole (PRILOSEC) 20 MG DR capsule TAKE ONE CAPSULE BY MOUTH ONE TIME DAILY  90 capsule 1     omeprazole (PRILOSEC) 20 MG DR capsule        triamterene-HCTZ (MAXZIDE-25) 37.5-25 MG tablet Take 1 tablet by mouth daily 90 tablet 1       Reviewed and updated as needed this visit by Provider         Review of Systems   ROS COMP: Constitutional, HEENT,  "cardiovascular, pulmonary, GI, , musculoskeletal, neuro, skin, endocrine and psych systems are negative, except as otherwise noted.      Objective    /76 (BP Location: Left arm, Patient Position: Sitting, Cuff Size: Adult Regular)   Pulse 68   Resp 20   LMP  (LMP Unknown)   SpO2 96%   There is no height or weight on file to calculate BMI.  Physical Exam   GENERAL: healthy, alert and no distress  NECK: no adenopathy, no asymmetry, masses, or scars and thyroid normal to palpation  RESP: lungs clear to auscultation - no rales, rhonchi or wheezes  CV: regular rate and rhythm, normal S1 S2, no S3 or S4, no murmur, click or rub, no peripheral edema and peripheral pulses strong  MS: ankle immobilized in walking cast    Diagnostic Test Results:  none         Assessment & Plan    (J45.30) Mild persistent asthma without complication  (primary encounter diagnosis)  Comment: refill of current medication- good control of asthma  Based on ACT and symptoms- spirometry not completed  Plan: budesonide (PULMICORT FLEXHALER) 180 MCG/ACT         inhaler            (E78.5) Hyperlipidemia LDL goal <130  Comment:   Plan: recheck fasting lipid and liver tests in 10 weeks             BMI:   Estimated body mass index is 30.18 kg/m  as calculated from the following:    Height as of 1/31/19: 1.594 m (5' 2.75\").    Weight as of 4/29/19: 76.7 kg (169 lb).   Weight management plan: Discussed healthy diet and exercise guidelines        FUTURE APPOINTMENTS:       - Follow-up visit for recheck of asthma medication in one year  Labs in three months  No follow-ups on file.    Beatrice Benz MD  [facility]            "

## 2019-07-10 ASSESSMENT — ASTHMA QUESTIONNAIRES: ACT_TOTALSCORE: 22

## 2019-09-16 DIAGNOSIS — E78.5 HYPERLIPIDEMIA LDL GOAL <100: ICD-10-CM

## 2019-09-16 DIAGNOSIS — E78.5 HYPERLIPIDEMIA LDL GOAL <130: ICD-10-CM

## 2019-09-16 LAB
ALT 1742-6: 37 U/L (ref 5–30)
CHOLEST SERPL-MCNC: 257 MG/DL (ref 0–199)
CHOLEST/HDLC SERPL: 3 {RATIO} (ref 0–5)
HDLC SERPL-MCNC: 82 MG/DL (ref 40–150)
LDLC SERPL CALC-MCNC: 138 MG/DL (ref 0–130)
TRIGL SERPL-MCNC: 186 MG/DL (ref 0–149)

## 2019-09-16 PROCEDURE — 80061 LIPID PANEL: CPT | Performed by: FAMILY MEDICINE

## 2019-09-16 PROCEDURE — 84460 ALANINE AMINO (ALT) (SGPT): CPT | Performed by: FAMILY MEDICINE

## 2019-09-16 PROCEDURE — 36415 COLL VENOUS BLD VENIPUNCTURE: CPT | Performed by: FAMILY MEDICINE

## 2019-09-16 NOTE — TELEPHONE ENCOUNTER
Routing to Danyelle for review due to Dr. Benz being out of office. Received incoming refill request for  Pending Prescriptions:                       Disp   Refills    atorvastatin (LIPITOR) 40 MG tablet [Phar*90 tab*0            Sig: TAKE ONE TABLET BY MOUTH ONE TIME DAILY     Patient was in today for lab only to have labs done. Results should be back soon for this, please send in refill when appropriate for patient.

## 2019-09-17 ENCOUNTER — MYC MEDICAL ADVICE (OUTPATIENT)
Dept: FAMILY MEDICINE | Facility: CLINIC | Age: 69
End: 2019-09-17

## 2019-09-17 RX ORDER — ATORVASTATIN CALCIUM 40 MG/1
TABLET, FILM COATED ORAL
Qty: 90 TABLET | Refills: 1 | Status: SHIPPED | OUTPATIENT
Start: 2019-09-17 | End: 2020-01-30

## 2019-09-17 NOTE — TELEPHONE ENCOUNTER
Refilled for 6 months. Lipid panel only showed partial improvement, but given small bump in ALT, will wait to further increase dose.

## 2019-11-22 ENCOUNTER — HOSPITAL ENCOUNTER (OUTPATIENT)
Dept: MAMMOGRAPHY | Facility: CLINIC | Age: 69
Discharge: HOME | End: 2019-11-22
Attending: FAMILY MEDICINE | Admitting: FAMILY MEDICINE
Payer: MEDICARE

## 2019-11-22 DIAGNOSIS — Z12.31 VISIT FOR SCREENING MAMMOGRAM: ICD-10-CM

## 2019-11-22 PROCEDURE — 77063 BREAST TOMOSYNTHESIS BI: CPT

## 2019-11-24 ENCOUNTER — MYC MEDICAL ADVICE (OUTPATIENT)
Dept: FAMILY MEDICINE | Facility: CLINIC | Age: 69
End: 2019-11-24

## 2019-12-11 DIAGNOSIS — K21.9 GASTROESOPHAGEAL REFLUX DISEASE, ESOPHAGITIS PRESENCE NOT SPECIFIED: ICD-10-CM

## 2019-12-12 NOTE — TELEPHONE ENCOUNTER
Pending Prescriptions:                       Disp   Refills    omeprazole (PRILOSEC) 20 MG DR capsule [P*90 cap*             Sig: TAKE ONE CAPSULE BY MOUTH ONE TIME DAILY     Review   Fax deny and close encounter  Dona  [phone] (home) NONE (work)

## 2019-12-15 ENCOUNTER — HEALTH MAINTENANCE LETTER (OUTPATIENT)
Age: 69
End: 2019-12-15

## 2020-01-13 DIAGNOSIS — B00.9 HERPES SIMPLEX VIRUS (HSV) INFECTION: ICD-10-CM

## 2020-01-13 RX ORDER — ACYCLOVIR 800 MG/1
TABLET ORAL
Qty: 24 TABLET | Refills: 0 | COMMUNITY
Start: 2020-01-13

## 2020-01-13 NOTE — TELEPHONE ENCOUNTER
Priscilla Linda is requesting a refill of:    Refused Prescriptions:                       Disp   Refills    acyclovir (ZOVIRAX) 800 MG tablet [Pharmac*24 tab*0        Sig: TAKE AS DIRECTED FOR OUTBREAKS  Refused By: GERARDO ARECHIGA  Reason for Refusal: Patient needs appointment    Last refilled 12/07/18, not discussed since then. Pt needs OV for further refills.

## 2020-01-23 DIAGNOSIS — B00.9 HERPES SIMPLEX VIRUS (HSV) INFECTION: ICD-10-CM

## 2020-01-23 RX ORDER — ACYCLOVIR 800 MG/1
TABLET ORAL
Qty: 24 TABLET | Refills: 0 | Status: ON HOLD | OUTPATIENT
Start: 2020-01-23 | End: 2020-09-03

## 2020-01-23 NOTE — TELEPHONE ENCOUNTER
Pending Prescriptions:                       Disp   Refills    acyclovir (ZOVIRAX) 800 MG tablet [Pharma*24 tab*             Sig: TAKE AS DIRECTED FOR OUTBREAKS    Fax or deny

## 2020-01-30 ENCOUNTER — OFFICE VISIT (OUTPATIENT)
Dept: FAMILY MEDICINE | Facility: CLINIC | Age: 70
End: 2020-01-30

## 2020-01-30 VITALS
DIASTOLIC BLOOD PRESSURE: 82 MMHG | HEART RATE: 64 BPM | BODY MASS INDEX: 30.37 KG/M2 | HEIGHT: 63 IN | OXYGEN SATURATION: 98 % | WEIGHT: 171.4 LBS | SYSTOLIC BLOOD PRESSURE: 138 MMHG | TEMPERATURE: 98 F

## 2020-01-30 DIAGNOSIS — E78.5 HYPERLIPIDEMIA LDL GOAL <100: ICD-10-CM

## 2020-01-30 DIAGNOSIS — I10 ESSENTIAL HYPERTENSION, BENIGN: ICD-10-CM

## 2020-01-30 DIAGNOSIS — J45.30 MILD PERSISTENT ASTHMA WITHOUT COMPLICATION: ICD-10-CM

## 2020-01-30 DIAGNOSIS — B00.9 RECURRENT HERPES SIMPLEX: ICD-10-CM

## 2020-01-30 LAB
ALT 1742-6: 18 U/L (ref 0–32)
BUN SERPL-MCNC: 22 MG/DL (ref 7–25)
BUN/CREATININE RATIO: 22.4
CALCIUM SERPL-MCNC: 9.8 MG/DL (ref 8.6–10.3)
CHLORIDE SERPLBLD-SCNC: 105.4 MMOL/L (ref 98–110)
CHOLEST SERPL-MCNC: 225 MG/DL (ref 0–199)
CHOLEST/HDLC SERPL: 3 {RATIO} (ref 0–5)
CO2 SERPL-SCNC: 29.6 MMOL/L (ref 20–32)
CREAT SERPL-MCNC: 0.98 MG/DL (ref 0.7–1.18)
GLUCOSE SERPL-MCNC: 102 MG/DL (ref 60–99)
HDLC SERPL-MCNC: 81 MG/DL (ref 40–150)
LDLC SERPL CALC-MCNC: 107 MG/DL (ref 0–130)
POTASSIUM SERPL-SCNC: 4.22 MMOL/L (ref 3.5–5.3)
SODIUM SERPL-SCNC: 144 MMOL/L (ref 135–146)
TRIGL SERPL-MCNC: 187 MG/DL (ref 0–149)

## 2020-01-30 PROCEDURE — 80061 LIPID PANEL: CPT | Performed by: FAMILY MEDICINE

## 2020-01-30 PROCEDURE — 99214 OFFICE O/P EST MOD 30 MIN: CPT | Performed by: FAMILY MEDICINE

## 2020-01-30 PROCEDURE — 36415 COLL VENOUS BLD VENIPUNCTURE: CPT | Performed by: FAMILY MEDICINE

## 2020-01-30 PROCEDURE — 84460 ALANINE AMINO (ALT) (SGPT): CPT | Performed by: FAMILY MEDICINE

## 2020-01-30 PROCEDURE — 80048 BASIC METABOLIC PNL TOTAL CA: CPT | Performed by: FAMILY MEDICINE

## 2020-01-30 RX ORDER — TRIAMTERENE/HYDROCHLOROTHIAZID 37.5-25 MG
1 TABLET ORAL DAILY
Qty: 90 TABLET | Refills: 3 | Status: SHIPPED | OUTPATIENT
Start: 2020-01-30 | End: 2020-12-03

## 2020-01-30 RX ORDER — ATORVASTATIN CALCIUM 40 MG/1
40 TABLET, FILM COATED ORAL DAILY
Qty: 90 TABLET | Refills: 3 | Status: SHIPPED | OUTPATIENT
Start: 2020-01-30 | End: 2020-10-20 | Stop reason: DRUGHIGH

## 2020-01-30 RX ORDER — METOPROLOL TARTRATE 25 MG/1
25 TABLET, FILM COATED ORAL 2 TIMES DAILY
Qty: 180 TABLET | Refills: 3 | Status: SHIPPED | OUTPATIENT
Start: 2020-01-30 | End: 2020-12-03

## 2020-01-30 RX ORDER — ACYCLOVIR 400 MG/1
400 TABLET ORAL EVERY 12 HOURS
Qty: 180 TABLET | Refills: 1 | Status: ON HOLD | OUTPATIENT
Start: 2020-01-30 | End: 2020-09-03

## 2020-01-30 RX ORDER — ALBUTEROL SULFATE 90 UG/1
1-2 AEROSOL, METERED RESPIRATORY (INHALATION) EVERY 4 HOURS PRN
Qty: 1 INHALER | Refills: 1 | Status: SHIPPED | OUTPATIENT
Start: 2020-01-30 | End: 2021-11-22

## 2020-01-30 ASSESSMENT — MIFFLIN-ST. JEOR: SCORE: 1272.63

## 2020-01-30 NOTE — NURSING NOTE
Scooter is here today for a fasting med recheck.    Pre-visit Screening:  Immunizations:  up to date  Colonoscopy:  is up to date  Mammogram: is up to date  Asthma Action Test/Plan: Done today  PHQ9:  NA  GAD7:  NA  Questioned patient about current smoking habits Pt. has never smoked.  Ok to leave detailed message on voice mail for today's visit only Yes, phone # [phone]

## 2020-01-30 NOTE — PROGRESS NOTES
Subjective     Priscilla Linda is a 69 year old female who presents to clinic today for the following health issues:    Fractured ankle in   Helping her  recover from recent spine surgery-     HPI   Hyperlipidemia Follow-Up      Are you regularly taking any medication or supplement to lower your cholesterol?   Yes- Atorvastatin    Are you having muscle aches or other side effects that you think could be caused by your cholesterol lowering medication?  No    Hypertension Follow-up      Do you check your blood pressure regularly outside of the clinic? No recorded blood pressures to review  Are you following a low salt diet? Yes- no added salt          How many days per week do you exercise enough to make your heart beat faster? Less active with recent family commitments     How many days per week do you miss taking your medication? 0       Patient Active Problem List   Diagnosis     Asthma, mild persistent     Mixed hyperlipidemia     Ocular migraine     Herpes simplex virus (HSV) infection     Essential hypertension, benign     Health Care Home     Ganglion cyst of wrist, left     Past Surgical History:   Procedure Laterality Date     C NONSPECIFIC PROCEDURE      ganglion cyst     C TOTAL ABDOM HYSTERECTOMY      prolapse, marshal chay/ BSO     CATARACT IOL, RT/LT      Right and Left     COLONOSCOPY  2013    polyps, repeat in 3 years     COLONOSCOPY  2016    tub adenoma/ repeat 5 years     FOOT SURGERY Right 10/2018    fusion      HC COLONOSCOPY THRU STOMA, DIAGNOSTIC  ,     hx of iron defic/ two normal colonoscopies     TUBAL LIGATION       UPPER GI ENDOSCOPY      hiatal hernia       Social History     Tobacco Use     Smoking status: Never Smoker     Smokeless tobacco: Never Used   Substance Use Topics     Alcohol use: Yes     Alcohol/week: 0.0 standard drinks     Comment: very occ     Family History   Problem Relation Age of Onset     Diabetes Father          age  75 complications     Hypertension Mother         mild     Diabetes Sister         type 1     Diabetes Brother         aodm in his forties     Blood Disease No family hx of         family hx of hemochromatosis         Current Outpatient Medications   Medication Sig Dispense Refill     acyclovir (ZOVIRAX) 400 MG tablet Take 1 tablet (400 mg) by mouth every 12 hours 180 tablet 1     acyclovir (ZOVIRAX) 800 MG tablet TAKE AS DIRECTED FOR OUTBREAKS 24 tablet 0     albuterol (PROAIR HFA) 108 (90 Base) MCG/ACT inhaler Inhale 1-2 puffs into the lungs every 4 hours as needed 1 Inhaler 1     ASPIRIN NOT PRESCRIBED (INTENTIONAL) continuous prn for other Antiplatelet medication not prescribed intentionally due to Refusal by patient and Not indicated based on age       atorvastatin (LIPITOR) 40 MG tablet Take 1 tablet (40 mg) by mouth daily 90 tablet 3     budesonide (PULMICORT FLEXHALER) 180 MCG/ACT inhaler Inhale 1 puff into the lungs 2 times daily 2 Inhaler 3     metoprolol tartrate (LOPRESSOR) 25 MG tablet Take 1 tablet (25 mg) by mouth 2 times daily 180 tablet 3     MULTI-VITAMIN/IRON OR TABS 1 TABLET DAILY       omeprazole (PRILOSEC) 20 MG DR capsule TAKE ONE CAPSULE BY MOUTH ONE TIME DAILY  90 capsule 3     triamterene-HCTZ (MAXZIDE-25) 37.5-25 MG tablet Take 1 tablet by mouth daily 90 tablet 3     BP Readings from Last 3 Encounters:   01/30/20 138/82   07/09/19 112/76   04/29/19 128/78    Wt Readings from Last 3 Encounters:   01/30/20 77.7 kg (171 lb 6.4 oz)   04/29/19 76.7 kg (169 lb)   03/29/19 77.1 kg (170 lb)                    Longevity in her family  MOTHER TURNS 90     Reviewed and updated as needed this visit by Provider         Review of Systems   ROS COMP: Constitutional, HEENT, cardiovascular, pulmonary, GI, , musculoskeletal, neuro, skin, endocrine and psych systems are negative, except as otherwise noted.    Cold sores in nose and mouth  Under a lot of stress-   Recommended six months of suppressive  "therapy- RX for acyclovir    Asthma has been well controlled  ACT scores 25        Objective    /82 (BP Location: Right arm, Patient Position: Sitting, Cuff Size: Adult Large)   Pulse 64   Temp 98  F (36.7  C) (Oral)   Ht 1.594 m (5' 2.75\")   Wt 77.7 kg (171 lb 6.4 oz)   LMP  (LMP Unknown)   SpO2 98%   BMI 30.60 kg/m    There is no height or weight on file to calculate BMI.  Physical Exam   GENERAL: healthy, alert and no distress  NECK: no adenopathy, no asymmetry, masses, or scars and thyroid normal to palpation  External ears  and canals clear bilaterally. TM's normal bilaterally. Nose normal without lesions or discharge. Oropharynx normal. Neck supple without palpable adenopathy.  No herpes ulcers currently nose or mouth  RESP: lungs clear to auscultation - no rales, rhonchi or wheezes  CV: regular rate and rhythm,  no murmur   MS: no gross musculoskeletal defects noted, no edema    Diagnostic Test Results:  Labs reviewed in Epic  Results for orders placed or performed in visit on 01/30/20   Lipid Panel ([facility])     Status: Abnormal   Result Value Ref Range    Cholesterol 225 (A) 0 - 199 mg/dL    Triglycerides 187 (A) 0 - 149 mg/dL    HDL Cholesterol 81 40 - 150 mg/dL    LDL Cholesterol Direct 107 0 - 130 mg/dL    Cholesterol/HDL Ratio 3 0 - 5   ALT (B[facility])     Status: None   Result Value Ref Range    ALT 18 0 - 32 U/L   Basic Metabolic Panel ([facility])     Status: Abnormal   Result Value Ref Range    Carbon Dioxide 29.6 20 - 32 mmol/L    Creatinine 0.98 0.70 - 1.18 mg/dL    Glucose 102 (A) 60 - 99 mg/dL    Sodium 144.0 135 - 146 mmol/L    Potassium 4.22 3.5 - 5.3 mmol/L    Chloride 105.4 98 - 110 mmol/L    Urea Nitrogen 22 7 - 25 mg/dL    Calcium 9.8 8.6 - 10.3 mg/dL    BUN/Creatinine Ratio 22.4            Assessment & Plan    (J45.30) Mild persistent asthma without complication  Comment:symptoms controlled- albuterol refill updated  Plan: albuterol (PROAIR HFA) 108 (90 Base) MCG/ACT         Inhaler  Update " "refill of daily inhaler- pulmicort            (E78.5) Hyperlipidemia LDL goal <100  Comment: LDL at goal- refill of current dose  Plan: atorvastatin (LIPITOR) 40 MG tablet, ALT (BFP),        VENOUS COLLECTION            (I10) Essential hypertension, benign  Comment: controlled  Plan: Lipid Panel ([facility]), metoprolol tartrate         (LOPRESSOR) 25 MG tablet, triamterene-HCTZ         (MAXZIDE-25) 37.5-25 MG tablet, Basic Metabolic        Panel (B[facility])            (B00.9) Recurrent herpes simplex  Comment: suppressive therapy  Plan: acyclovir (ZOVIRAX) 400 MG tablet                     BMI:   Estimated body mass index is 30.6 kg/m  as calculated from the following:    Height as of this encounter: 1.594 m (5' 2.75\").    Weight as of this encounter: 77.7 kg (171 lb 6.4 oz).   Weight management plan: Discussed healthy diet and exercise guidelines        FUTURE APPOINTMENTS:       - Follow-up visit in  12 months pending needs for refills  No follow-ups on file.    Beatrice Benz MD  [facility]        "

## 2020-01-31 ASSESSMENT — ASTHMA QUESTIONNAIRES: ACT_TOTALSCORE: 25

## 2020-06-25 ENCOUNTER — TRANSFERRED RECORDS (OUTPATIENT)
Dept: FAMILY MEDICINE | Facility: CLINIC | Age: 70
End: 2020-06-25

## 2020-07-13 ENCOUNTER — OFFICE VISIT (OUTPATIENT)
Dept: FAMILY MEDICINE | Facility: CLINIC | Age: 70
End: 2020-07-13

## 2020-07-13 VITALS
TEMPERATURE: 97.3 F | HEIGHT: 63 IN | BODY MASS INDEX: 30.12 KG/M2 | HEART RATE: 64 BPM | DIASTOLIC BLOOD PRESSURE: 76 MMHG | RESPIRATION RATE: 20 BRPM | SYSTOLIC BLOOD PRESSURE: 128 MMHG | WEIGHT: 170 LBS

## 2020-07-13 DIAGNOSIS — I10 ESSENTIAL HYPERTENSION, BENIGN: ICD-10-CM

## 2020-07-13 DIAGNOSIS — E78.2 MIXED HYPERLIPIDEMIA: ICD-10-CM

## 2020-07-13 DIAGNOSIS — Z01.818 PREOPERATIVE EXAMINATION: Primary | ICD-10-CM

## 2020-07-13 DIAGNOSIS — M79.672 LEFT FOOT PAIN: ICD-10-CM

## 2020-07-13 DIAGNOSIS — J45.30 MILD PERSISTENT ASTHMA WITHOUT COMPLICATION: ICD-10-CM

## 2020-07-13 LAB
ALBUMIN SERPL-MCNC: 4.8 G/DL (ref 3.6–5.1)
ALBUMIN/GLOB SERPL: 2.1 {RATIO} (ref 1–2.5)
ALP SERPL-CCNC: 102 U/L (ref 33–130)
ALT 1742-6: 21 U/L (ref 0–32)
AST 1920-8: 23 U/L (ref 0–35)
BILIRUB SERPL-MCNC: 1.2 MG/DL (ref 0.2–1.2)
BUN SERPL-MCNC: 23 MG/DL (ref 7–25)
BUN/CREATININE RATIO: 22.8 (ref 6–22)
CALCIUM SERPL-MCNC: 10.3 MG/DL (ref 8.6–10.3)
CHLORIDE SERPLBLD-SCNC: 104.2 MMOL/L (ref 98–110)
CO2 SERPL-SCNC: 31.5 MMOL/L (ref 20–32)
CREAT SERPL-MCNC: 1.01 MG/DL (ref 0.7–1.18)
ERYTHROCYTE [DISTWIDTH] IN BLOOD BY AUTOMATED COUNT: 12 %
GLOBULIN, CALCULATED - QUEST: 2.3 (ref 1.9–3.7)
GLUCOSE SERPL-MCNC: 100 MG/DL (ref 60–99)
HCT VFR BLD AUTO: 45.5 % (ref 35–47)
HEMOGLOBIN: 15.7 G/DL (ref 11.7–15.7)
MCH RBC QN AUTO: 32.8 PG (ref 26–33)
MCHC RBC AUTO-ENTMCNC: 34.5 G/DL (ref 31–36)
MCV RBC AUTO: 95.2 FL (ref 78–100)
PLATELET COUNT - QUEST: 311 10^9/L (ref 150–375)
POTASSIUM SERPL-SCNC: 4.39 MMOL/L (ref 3.5–5.3)
PROT SERPL-MCNC: 7.1 G/DL (ref 6.1–8.1)
RBC # BLD AUTO: 4.78 10*12/L (ref 3.8–5.2)
SODIUM SERPL-SCNC: 145.1 MMOL/L (ref 135–146)
WBC # BLD AUTO: 9.5 10*9/L (ref 4–11)

## 2020-07-13 PROCEDURE — 85027 COMPLETE CBC AUTOMATED: CPT | Performed by: FAMILY MEDICINE

## 2020-07-13 PROCEDURE — 36415 COLL VENOUS BLD VENIPUNCTURE: CPT | Performed by: FAMILY MEDICINE

## 2020-07-13 PROCEDURE — 93000 ELECTROCARDIOGRAM COMPLETE: CPT | Performed by: FAMILY MEDICINE

## 2020-07-13 PROCEDURE — 80053 COMPREHEN METABOLIC PANEL: CPT | Performed by: FAMILY MEDICINE

## 2020-07-13 PROCEDURE — 99214 OFFICE O/P EST MOD 30 MIN: CPT | Mod: 25 | Performed by: FAMILY MEDICINE

## 2020-07-13 ASSESSMENT — MIFFLIN-ST. JEOR: SCORE: 1266.27

## 2020-07-13 NOTE — PROGRESS NOTES
[facility]  [address]  [address]  [facility] [address]  [phone]  Dept: [phone]    PRE-OP EVALUATION:  Today's date: 2020    Priscilla Linda (: 1950) presents for pre-operative evaluation assessment as requested by Dr. Bonds.  She requires evaluation and anesthesia risk assessment prior to undergoing surgery/procedure for treatment of left foot .    Proposed Surgery/ Procedure: left foot  Date of Surgery/ Procedure: 20  Time of Surgery/ Procedure: [facility]  Hospital/Surgical Facility: [facility]    Primary Physician: Emanuel Riggs  Type of Anesthesia Anticipated: to be determined    Patient has a Health Care Directive or Living Will:  YES     1. NO - Do you have a history of heart attack, stroke, stent, bypass or surgery on an artery in the head, neck, heart or legs?  2. NO - Do you ever have any pain or discomfort in your chest?  3. NO - Do you have a history of  Heart Failure?  4. NO - Are you troubled by shortness of breath when: walking on the level, up a slight hill or at night?  5. NO - Do you currently have a cold, bronchitis or other respiratory infection?  6. NO - Do you have a cough, shortness of breath or wheezing?  7. NO - Do you sometimes get pains in the calves of your legs when you walk?  8. NO - Do you or anyone in your family have previous history of blood clots?  9. NO - Do you or does anyone in your family have a serious bleeding problem such as prolonged bleeding following surgeries or cuts?  10. NO - Have you ever had problems with anemia or been told to take iron pills?  11. NO - Have you had any abnormal blood loss such as black, tarry or bloody stools, or abnormal vaginal bleeding?  12. NO - Have you ever had a blood transfusion?  13. NO - Have you or any of your relatives ever had problems with anesthesia?  14. NO - Do you have sleep apnea, excessive snoring or daytime drowsiness?  15. NO - Do you have any prosthetic heart  valves?  16. NO - Do you have prosthetic joints?  17. NO - Is there any chance that you may be pregnant?      HPI:     HPI related to upcoming procedure: left foot pain, previous surgery not healing completely      See problem list for active medical problems.  Problems all longstanding and stable, except as noted/documented.  See ROS for pertinent symptoms related to these conditions.    ASTHMA - Patient has a longstanding history of moderate-severe Asthma . Patient has been doing well overall noting NO SYMPTOMS and continues on medication regimen consisting of albuterol prn without adverse reactions or side effects.     HYPERTENSION - Patient has longstanding history of HTN , currently denies any symptoms referable to elevated blood pressure. Specifically denies chest pain, palpitations, dyspnea, orthopnea, PND or peripheral edema. Blood pressure readings have been in normal range. Current medication regimen is as listed below. Patient denies any side effects of medication.       MEDICAL HISTORY:     Patient Active Problem List    Diagnosis Date Noted     Ganglion cyst of wrist, left 10/10/2018     Priority: Medium     Essential hypertension, benign 03/21/2012     Priority: Medium     Asthma, mild persistent 02/12/2010     Priority: Medium     Mixed hyperlipidemia 02/12/2010     Priority: Medium     Ocular migraine 02/12/2010     Priority: Medium     Problem list name updated by automated process. Provider to review       Herpes simplex virus (HSV) infection 02/12/2010     Priority: Medium     Problem list name updated by automated process. 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    Hx of Clomid       Past  Surgical History:   Procedure Laterality Date     C NONSPECIFIC PROCEDURE  1999    ganglion cyst     C TOTAL ABDOM HYSTERECTOMY  1995    prolapse, marshal chay/ BSO     CATARACT IOL, RT/LT  2007    Right and Left     COLONOSCOPY  6/2013    polyps, repeat in 3 years     COLONOSCOPY  8/2016    tub adenoma/ repeat 5 years     FOOT SURGERY Right 10/2018    fusion      HC COLONOSCOPY THRU STOMA, DIAGNOSTIC  2003,     hx of iron defic/ two normal colonoscopies     TUBAL LIGATION  1983     UPPER GI ENDOSCOPY  2003    hiatal hernia     Current Outpatient Medications   Medication Sig Dispense Refill     acyclovir (ZOVIRAX) 400 MG tablet Take 1 tablet (400 mg) by mouth every 12 hours 180 tablet 1     acyclovir (ZOVIRAX) 800 MG tablet TAKE AS DIRECTED FOR OUTBREAKS 24 tablet 0     albuterol (PROAIR HFA) 108 (90 Base) MCG/ACT inhaler Inhale 1-2 puffs into the lungs every 4 hours as needed 1 Inhaler 1     ASPIRIN NOT PRESCRIBED (INTENTIONAL) continuous prn for other Antiplatelet medication not prescribed intentionally due to Refusal by patient and Not indicated based on age       atorvastatin (LIPITOR) 40 MG tablet Take 1 tablet (40 mg) by mouth daily 90 tablet 3     budesonide (PULMICORT FLEXHALER) 180 MCG/ACT inhaler Inhale 1 puff into the lungs 2 times daily 2 Inhaler 3     metoprolol tartrate (LOPRESSOR) 25 MG tablet Take 1 tablet (25 mg) by mouth 2 times daily 180 tablet 3     MULTI-VITAMIN/IRON OR TABS 1 TABLET DAILY       omeprazole (PRILOSEC) 20 MG DR capsule TAKE ONE CAPSULE BY MOUTH ONE TIME DAILY  90 capsule 3     triamterene-HCTZ (MAXZIDE-25) 37.5-25 MG tablet Take 1 tablet by mouth daily 90 tablet 3     OTC products: None, except as noted above    No Known Allergies   Latex Allergy: NO    Social History     Tobacco Use     Smoking status: Never Smoker     Smokeless tobacco: Never Used   Substance Use Topics     Alcohol use: Yes     Alcohol/week: 0.0 standard drinks     Comment: very occ     History   Drug Use  "No       REVIEW OF SYSTEMS:   CONSTITUTIONAL: NEGATIVE for fever, chills, change in weight  ENT/MOUTH: NEGATIVE for ear, mouth and throat problems  RESP: NEGATIVE for significant cough or SOB  CV: NEGATIVE for chest pain, palpitations or peripheral edema    EXAM:   LMP  (LMP Unknown)    /76 (BP Location: Right arm, Patient Position: Chair, Cuff Size: Adult Regular)   Pulse 64   Temp 97.3  F (36.3  C)   Resp 20   Ht 1.594 m (5' 2.75\")   Wt 77.1 kg (170 lb)   LMP  (LMP Unknown)   BMI 30.35 kg/m     GENERAL APPEARANCE: healthy, alert and no distress  HENT: ear canals and TM's normal and nose and mouth without ulcers or lesions  RESP: lungs clear to auscultation - no rales, rhonchi or wheezes  CV: regular rate and rhythm, normal S1 S2, no S3 or S4 and no murmur, click or rub   ABDOMEN: soft, nontender, no HSM or masses and bowel sounds normal  NEURO: Normal strength and tone, sensory exam grossly normal, mentation intact and speech normal    DIAGNOSTICS:     EKG: appears normal, NSR, normal axis, normal intervals, no acute ST/T changes c/w ischemia, no LVH by voltage criteria, unchanged from previous tracings, RBB stable  Labs Resulted Today:   Results for orders placed or performed in visit on 07/13/20   HEMOGRAM/PLATELET ([facility])     Status: None   Result Value Ref Range    WBC 9.5 4.0 - 11 10*9/L    RBC Count 4.78 3.8 - 5.2 10*12/L    Hemoglobin 15.7 11.7 - 15.7 g/dL    Hematocrit 45.5 35.0 - 47.0 %    MCV 95.2 78 - 100 fL    MCH 32.8 26 - 33 pg    MCHC 34.5 31 - 36 g/dL    RDW 12.0 %    Platelet Count 311 150 - 375 10^9/L       Recent Labs   Lab Test 01/30/20 03/20/19  0805  01/27/19  1156  10/10/18  1243   HGB  --   --   --  15.6  --  15.6   PLT  --   --   --  355  --  343   .0 140   < > 143   < >  --    POTASSIUM 4.22 3.9   < > 3.4   < >  --    CR 0.98 0.85   < > 0.84   < >  --     < > = values in this interval not displayed.        IMPRESSION:   Reason for surgery/procedure: left foot pain, " arthritis    The proposed surgical procedure is considered INTERMEDIATE risk.    REVISED CARDIAC RISK INDEX  The patient has the following serious cardiovascular risks for perioperative complications such as (MI, PE, VFib and 3  AV Block):  No serious cardiac risks  INTERPRETATION: 0 risks: Class I (very low risk - 0.4% complication rate)    The patient has the following additional risks for perioperative complications:  No identified additional risks      ICD-10-CM    1. Preoperative examination  Z01.818 HEMOGRAM/PLATELET ([facility])     VENOUS COLLECTION     EKG 12-lead complete w/read - Clinics   2. Left foot pain  M79.672 HEMOGRAM/PLATELET ([facility])     VENOUS COLLECTION     EKG 12-lead complete w/read - Clinics   3. Mild persistent asthma without complication  J45.30    4. Essential hypertension, benign  I10 EKG 12-lead complete w/read - Clinics       RECOMMENDATIONS:         --Patient is to take metoprolol and pulmicort medications on the day of surgery-others when home    APPROVAL GIVEN to proceed with proposed procedure, without further diagnostic evaluation       Signed Electronically by: Emanuel Riggs MD    Copy of this evaluation report is provided to requesting physician.    [facility] Preop Guidelines    Revised Cardiac Risk Index

## 2020-07-13 NOTE — NURSING NOTE
Priscilla Linda is here for a pre-op exam    Questioned patient about current smoking habits.  Pt. has never smoked.  PULSE regular  My Chart: active  CLASSIFICATION OF OVERWEIGHT AND OBESITY BY BMI                        Obesity Class           BMI(kg/m2)  Underweight                                    < 18.5  Normal                                         18.5-24.9  Overweight                                     25.0-29.9  OBESITY                     I                  30.0-34.9                             II                 35.0-39.9  EXTREME OBESITY             III                >40                            Patient's  BMI Body mass index is 30.35 kg/m .  http://hin.nhlbi.nih.gov/menuplanner/menu.cgi  Pre-visit planning  Immunizations - up to date  Colonoscopy - is up to date  Mammogram - is up to date  Asthma -   PHQ9 -    JEFFY-7 -

## 2020-09-02 ENCOUNTER — APPOINTMENT (OUTPATIENT)
Dept: CT IMAGING | Facility: CLINIC | Age: 70
End: 2020-09-02
Attending: PHYSICIAN ASSISTANT
Payer: MEDICARE

## 2020-09-02 ENCOUNTER — HOSPITAL ENCOUNTER (EMERGENCY)
Facility: CLINIC | Age: 70
Discharge: ANOTHER HOSPITAL | End: 2020-09-03
Attending: PHYSICIAN ASSISTANT | Admitting: PHYSICIAN ASSISTANT
Payer: MEDICARE

## 2020-09-02 VITALS
WEIGHT: 169 LBS | RESPIRATION RATE: 12 BRPM | OXYGEN SATURATION: 96 % | DIASTOLIC BLOOD PRESSURE: 78 MMHG | HEART RATE: 68 BPM | BODY MASS INDEX: 30.18 KG/M2 | SYSTOLIC BLOOD PRESSURE: 128 MMHG | TEMPERATURE: 97.6 F

## 2020-09-02 DIAGNOSIS — R11.2 NAUSEA AND VOMITING: ICD-10-CM

## 2020-09-02 DIAGNOSIS — R42 DIZZINESS: ICD-10-CM

## 2020-09-02 LAB
ANION GAP SERPL CALCULATED.3IONS-SCNC: 11 MMOL/L (ref 3–14)
APTT PPP: 25 SEC (ref 22–37)
BASOPHILS # BLD AUTO: 0.1 10E9/L (ref 0–0.2)
BASOPHILS NFR BLD AUTO: 0.5 %
BUN SERPL-MCNC: 20 MG/DL (ref 7–30)
CALCIUM SERPL-MCNC: 9.8 MG/DL (ref 8.5–10.1)
CHLORIDE SERPL-SCNC: 106 MMOL/L (ref 94–109)
CO2 SERPL-SCNC: 22 MMOL/L (ref 20–32)
CREAT SERPL-MCNC: 0.86 MG/DL (ref 0.52–1.04)
DIFFERENTIAL METHOD BLD: ABNORMAL
EOSINOPHIL # BLD AUTO: 0.3 10E9/L (ref 0–0.7)
EOSINOPHIL NFR BLD AUTO: 2.4 %
ERYTHROCYTE [DISTWIDTH] IN BLOOD BY AUTOMATED COUNT: 11.9 % (ref 10–15)
GFR SERPL CREATININE-BSD FRML MDRD: 69 ML/MIN/{1.73_M2}
GLUCOSE BLDC GLUCOMTR-MCNC: 186 MG/DL (ref 70–99)
GLUCOSE SERPL-MCNC: 186 MG/DL (ref 70–99)
HCT VFR BLD AUTO: 44.3 % (ref 35–47)
HGB BLD-MCNC: 15 G/DL (ref 11.7–15.7)
IMM GRANULOCYTES # BLD: 0.1 10E9/L (ref 0–0.4)
IMM GRANULOCYTES NFR BLD: 0.4 %
INR PPP: 0.94 (ref 0.86–1.14)
LYMPHOCYTES # BLD AUTO: 2.1 10E9/L (ref 0.8–5.3)
LYMPHOCYTES NFR BLD AUTO: 17 %
MCH RBC QN AUTO: 30.9 PG (ref 26.5–33)
MCHC RBC AUTO-ENTMCNC: 33.9 G/DL (ref 31.5–36.5)
MCV RBC AUTO: 91 FL (ref 78–100)
MONOCYTES # BLD AUTO: 0.7 10E9/L (ref 0–1.3)
MONOCYTES NFR BLD AUTO: 5.7 %
NEUTROPHILS # BLD AUTO: 9 10E9/L (ref 1.6–8.3)
NEUTROPHILS NFR BLD AUTO: 74 %
NRBC # BLD AUTO: 0 10*3/UL
NRBC BLD AUTO-RTO: 0 /100
PLATELET # BLD AUTO: 330 10E9/L (ref 150–450)
POTASSIUM SERPL-SCNC: 3.3 MMOL/L (ref 3.4–5.3)
RBC # BLD AUTO: 4.85 10E12/L (ref 3.8–5.2)
SODIUM SERPL-SCNC: 139 MMOL/L (ref 133–144)
TROPONIN I SERPL-MCNC: <0.015 UG/L (ref 0–0.04)
WBC # BLD AUTO: 12.2 10E9/L (ref 4–11)

## 2020-09-02 PROCEDURE — C9803 HOPD COVID-19 SPEC COLLECT: HCPCS

## 2020-09-02 PROCEDURE — 80048 BASIC METABOLIC PNL TOTAL CA: CPT | Performed by: PHYSICIAN ASSISTANT

## 2020-09-02 PROCEDURE — 96376 TX/PRO/DX INJ SAME DRUG ADON: CPT | Mod: 59

## 2020-09-02 PROCEDURE — 85610 PROTHROMBIN TIME: CPT | Performed by: PHYSICIAN ASSISTANT

## 2020-09-02 PROCEDURE — 70496 CT ANGIOGRAPHY HEAD: CPT

## 2020-09-02 PROCEDURE — 85730 THROMBOPLASTIN TIME PARTIAL: CPT | Performed by: PHYSICIAN ASSISTANT

## 2020-09-02 PROCEDURE — 85025 COMPLETE CBC W/AUTO DIFF WBC: CPT | Performed by: PHYSICIAN ASSISTANT

## 2020-09-02 PROCEDURE — 25000128 H RX IP 250 OP 636: Performed by: PHYSICIAN ASSISTANT

## 2020-09-02 PROCEDURE — 99285 EMERGENCY DEPT VISIT HI MDM: CPT | Mod: 25

## 2020-09-02 PROCEDURE — 25000132 ZZH RX MED GY IP 250 OP 250 PS 637: Mod: GY | Performed by: PHYSICIAN ASSISTANT

## 2020-09-02 PROCEDURE — 96374 THER/PROPH/DIAG INJ IV PUSH: CPT | Mod: 59

## 2020-09-02 PROCEDURE — 25000125 ZZHC RX 250: Performed by: PHYSICIAN ASSISTANT

## 2020-09-02 PROCEDURE — 0042T CT HEAD PERFUSION WITH CONTRAST: CPT

## 2020-09-02 PROCEDURE — 84484 ASSAY OF TROPONIN QUANT: CPT | Performed by: PHYSICIAN ASSISTANT

## 2020-09-02 PROCEDURE — 00000146 ZZHCL STATISTIC GLUCOSE BY METER IP

## 2020-09-02 PROCEDURE — U0003 INFECTIOUS AGENT DETECTION BY NUCLEIC ACID (DNA OR RNA); SEVERE ACUTE RESPIRATORY SYNDROME CORONAVIRUS 2 (SARS-COV-2) (CORONAVIRUS DISEASE [COVID-19]), AMPLIFIED PROBE TECHNIQUE, MAKING USE OF HIGH THROUGHPUT TECHNOLOGIES AS DESCRIBED BY CMS-2020-01-R: HCPCS | Performed by: PHYSICIAN ASSISTANT

## 2020-09-02 PROCEDURE — 70450 CT HEAD/BRAIN W/O DYE: CPT | Mod: XS

## 2020-09-02 PROCEDURE — 93005 ELECTROCARDIOGRAM TRACING: CPT

## 2020-09-02 RX ORDER — MECLIZINE HYDROCHLORIDE 25 MG/1
25 TABLET ORAL ONCE
Status: COMPLETED | OUTPATIENT
Start: 2020-09-02 | End: 2020-09-02

## 2020-09-02 RX ORDER — ONDANSETRON 2 MG/ML
4 INJECTION INTRAMUSCULAR; INTRAVENOUS ONCE
Status: COMPLETED | OUTPATIENT
Start: 2020-09-02 | End: 2020-09-02

## 2020-09-02 RX ORDER — ONDANSETRON 2 MG/ML
4 INJECTION INTRAMUSCULAR; INTRAVENOUS EVERY 30 MIN PRN
Status: DISCONTINUED | OUTPATIENT
Start: 2020-09-02 | End: 2020-09-03 | Stop reason: HOSPADM

## 2020-09-02 RX ORDER — IOPAMIDOL 755 MG/ML
500 INJECTION, SOLUTION INTRAVASCULAR ONCE
Status: COMPLETED | OUTPATIENT
Start: 2020-09-02 | End: 2020-09-02

## 2020-09-02 RX ORDER — ASPIRIN 81 MG/1
324 TABLET, CHEWABLE ORAL ONCE
Status: COMPLETED | OUTPATIENT
Start: 2020-09-02 | End: 2020-09-02

## 2020-09-02 RX ADMIN — ONDANSETRON 4 MG: 2 INJECTION INTRAMUSCULAR; INTRAVENOUS at 21:33

## 2020-09-02 RX ADMIN — MECLIZINE HYDROCHLORIDE 25 MG: 25 TABLET ORAL at 21:33

## 2020-09-02 RX ADMIN — ONDANSETRON 4 MG: 2 INJECTION INTRAMUSCULAR; INTRAVENOUS at 20:56

## 2020-09-02 RX ADMIN — SODIUM CHLORIDE 95 ML: 9 INJECTION, SOLUTION INTRAVENOUS at 20:45

## 2020-09-02 RX ADMIN — IOPAMIDOL 120 ML: 755 INJECTION, SOLUTION INTRAVENOUS at 20:45

## 2020-09-02 RX ADMIN — ASPIRIN 81 MG 324 MG: 81 TABLET ORAL at 21:33

## 2020-09-02 ASSESSMENT — ENCOUNTER SYMPTOMS
VOMITING: 1
SPEECH DIFFICULTY: 0
HEADACHES: 1
CONFUSION: 0
NAUSEA: 1

## 2020-09-03 ENCOUNTER — HOSPITAL ENCOUNTER (OUTPATIENT)
Facility: CLINIC | Age: 70
Setting detail: OBSERVATION
Discharge: HOME | End: 2020-09-03
Attending: INTERNAL MEDICINE | Admitting: INTERNAL MEDICINE
Payer: MEDICARE

## 2020-09-03 ENCOUNTER — APPOINTMENT (OUTPATIENT)
Dept: PHYSICAL THERAPY | Facility: CLINIC | Age: 70
End: 2020-09-03
Attending: INTERNAL MEDICINE
Payer: MEDICARE

## 2020-09-03 ENCOUNTER — APPOINTMENT (OUTPATIENT)
Dept: MRI IMAGING | Facility: CLINIC | Age: 70
End: 2020-09-03
Attending: INTERNAL MEDICINE
Payer: MEDICARE

## 2020-09-03 VITALS
DIASTOLIC BLOOD PRESSURE: 68 MMHG | SYSTOLIC BLOOD PRESSURE: 120 MMHG | TEMPERATURE: 98.4 F | OXYGEN SATURATION: 92 % | HEART RATE: 69 BPM | RESPIRATION RATE: 16 BRPM

## 2020-09-03 DIAGNOSIS — R42 VERTIGO: Primary | ICD-10-CM

## 2020-09-03 LAB
INTERPRETATION ECG - MUSE: NORMAL
POTASSIUM SERPL-SCNC: 3.9 MMOL/L (ref 3.4–5.3)
SARS-COV-2 RNA SPEC QL NAA+PROBE: NOT DETECTED
SPECIMEN SOURCE: NORMAL

## 2020-09-03 PROCEDURE — G0378 HOSPITAL OBSERVATION PER HR: HCPCS

## 2020-09-03 PROCEDURE — A9585 GADOBUTROL INJECTION: HCPCS | Performed by: INTERNAL MEDICINE

## 2020-09-03 PROCEDURE — 25000128 H RX IP 250 OP 636: Performed by: INTERNAL MEDICINE

## 2020-09-03 PROCEDURE — 99207 ZZC NO CHARGE LOS: CPT | Performed by: HOSPITALIST

## 2020-09-03 PROCEDURE — 25500064 ZZH RX 255 OP 636: Performed by: INTERNAL MEDICINE

## 2020-09-03 PROCEDURE — 84132 ASSAY OF SERUM POTASSIUM: CPT | Performed by: INTERNAL MEDICINE

## 2020-09-03 PROCEDURE — 96374 THER/PROPH/DIAG INJ IV PUSH: CPT | Mod: 59

## 2020-09-03 PROCEDURE — 70553 MRI BRAIN STEM W/O & W/DYE: CPT

## 2020-09-03 PROCEDURE — 99235 HOSP IP/OBS SAME DATE MOD 70: CPT | Performed by: INTERNAL MEDICINE

## 2020-09-03 PROCEDURE — 36415 COLL VENOUS BLD VENIPUNCTURE: CPT | Performed by: INTERNAL MEDICINE

## 2020-09-03 PROCEDURE — 97162 PT EVAL MOD COMPLEX 30 MIN: CPT | Mod: GP | Performed by: PHYSICAL THERAPIST

## 2020-09-03 PROCEDURE — 25000132 ZZH RX MED GY IP 250 OP 250 PS 637: Mod: GY | Performed by: INTERNAL MEDICINE

## 2020-09-03 RX ORDER — METOPROLOL TARTRATE 25 MG/1
25 TABLET, FILM COATED ORAL 2 TIMES DAILY
Status: DISCONTINUED | OUTPATIENT
Start: 2020-09-03 | End: 2020-09-03 | Stop reason: HOSPADM

## 2020-09-03 RX ORDER — GADOBUTROL 604.72 MG/ML
8 INJECTION INTRAVENOUS ONCE
Status: COMPLETED | OUTPATIENT
Start: 2020-09-03 | End: 2020-09-03

## 2020-09-03 RX ORDER — MECLIZINE HYDROCHLORIDE 25 MG/1
25 TABLET ORAL 3 TIMES DAILY PRN
COMMUNITY
Start: 2020-09-03 | End: 2020-09-10

## 2020-09-03 RX ORDER — ACETAMINOPHEN 325 MG/1
650 TABLET ORAL EVERY 4 HOURS PRN
Status: DISCONTINUED | OUTPATIENT
Start: 2020-09-03 | End: 2020-09-03 | Stop reason: HOSPADM

## 2020-09-03 RX ORDER — ONDANSETRON 4 MG/1
4 TABLET, ORALLY DISINTEGRATING ORAL EVERY 6 HOURS PRN
Status: DISCONTINUED | OUTPATIENT
Start: 2020-09-03 | End: 2020-09-03 | Stop reason: HOSPADM

## 2020-09-03 RX ORDER — NALOXONE HYDROCHLORIDE 0.4 MG/ML
.1-.4 INJECTION, SOLUTION INTRAMUSCULAR; INTRAVENOUS; SUBCUTANEOUS
Status: DISCONTINUED | OUTPATIENT
Start: 2020-09-03 | End: 2020-09-03 | Stop reason: HOSPADM

## 2020-09-03 RX ORDER — ACETAMINOPHEN 500 MG
500 TABLET ORAL 2 TIMES DAILY PRN
COMMUNITY
End: 2023-04-27

## 2020-09-03 RX ORDER — AMOXICILLIN 250 MG
2 CAPSULE ORAL 2 TIMES DAILY PRN
Status: DISCONTINUED | OUTPATIENT
Start: 2020-09-03 | End: 2020-09-03 | Stop reason: HOSPADM

## 2020-09-03 RX ORDER — TRIAMTERENE/HYDROCHLOROTHIAZID 37.5-25 MG
1 TABLET ORAL DAILY
Status: DISCONTINUED | OUTPATIENT
Start: 2020-09-03 | End: 2020-09-03 | Stop reason: HOSPADM

## 2020-09-03 RX ORDER — AMOXICILLIN 250 MG
1 CAPSULE ORAL 2 TIMES DAILY PRN
Status: DISCONTINUED | OUTPATIENT
Start: 2020-09-03 | End: 2020-09-03 | Stop reason: HOSPADM

## 2020-09-03 RX ORDER — ACETAMINOPHEN 650 MG/1
650 SUPPOSITORY RECTAL EVERY 4 HOURS PRN
Status: DISCONTINUED | OUTPATIENT
Start: 2020-09-03 | End: 2020-09-03 | Stop reason: HOSPADM

## 2020-09-03 RX ORDER — IBUPROFEN 200 MG
200 TABLET ORAL 2 TIMES DAILY PRN
COMMUNITY
End: 2023-04-27

## 2020-09-03 RX ORDER — ONDANSETRON 2 MG/ML
4 INJECTION INTRAMUSCULAR; INTRAVENOUS EVERY 6 HOURS PRN
Status: DISCONTINUED | OUTPATIENT
Start: 2020-09-03 | End: 2020-09-03 | Stop reason: HOSPADM

## 2020-09-03 RX ADMIN — TRIAMTERENE AND HYDROCHLOROTHIAZIDE 1 TABLET: 37.5; 25 TABLET ORAL at 08:17

## 2020-09-03 RX ADMIN — GADOBUTROL 8 ML: 604.72 INJECTION INTRAVENOUS at 13:03

## 2020-09-03 RX ADMIN — OMEPRAZOLE 20 MG: 20 CAPSULE, DELAYED RELEASE ORAL at 08:17

## 2020-09-03 RX ADMIN — METOPROLOL TARTRATE 25 MG: 25 TABLET, FILM COATED ORAL at 08:17

## 2020-09-03 RX ADMIN — METOPROLOL TARTRATE 25 MG: 25 TABLET, FILM COATED ORAL at 01:17

## 2020-09-03 RX ADMIN — ONDANSETRON 4 MG: 2 INJECTION INTRAMUSCULAR; INTRAVENOUS at 01:33

## 2020-09-03 NOTE — DISCHARGE INSTRUCTIONS
If an appointment was recommended for you please call ASAP to schedule. Be aware, DUE TO COVID-19 PANDEMIC, MANY CLINICS ARE TEMPORARILY NOT TAKING IN-PERSON APPOINTMENTS. The clinic may schedule you for a virtual or phone visit--if this is the case, please answer your phone. If you develop any symptoms or have any followup questions please call your primary care clinic. If it is an emergency, please dial 911.      It is very important to check in with your provider--during a phone or clinic visit, talk about your hospital stay.  Tell the clinic provider how you feel.  Talk about the medications listed on the discharge papers.  Your doctor will update records, make sure you are still doing OK, and decide if any tests or medication changes are needed.

## 2020-09-03 NOTE — H&P
[facility]    History and Physical - Hospitalist Service       Date of Admission:  9/3/2020    Assessment & Plan   Priscilla Linda is a 70 year old female with a history of HTN, HLP and asthma who had surgery on her left foot approximately 6 weeks initially presented to the ED at [facility] for dizziness and was subsequently transferred to [facility] due a lack of bed.    Vertigo, suspect BPPV  Initially presented with dizziness, N/V.  Code stroke called and neurology consulted.  CT head and CTA head/neck were essentially normal. Neurology recommended MRI and if normal patient could be discharged to home.  Unfortunately the patient's vertigo was still severe and the decision was made to admit her to observation.  Prior to transfer   - Admission under observation status  - MRI brain ordered  - Holding off on Meclizine PRN so we can have the vestibular evaluation   - PT for vestibular evaluation  - Neurology consulted and appreciate their recommendations     HTN   HLP   - PTA Lopressor and Maxzide ordered  - Holding PTA statin as observation status     H/o Asthma  No issues currently.    - Holding PTA inhaler as observation status.  Can use her own if brought in        Diet: Regular Diet Adult    DVT Prophylaxis: Low Risk/Ambulatory with no VTE prophylaxis indicated  Lopez Catheter: not present  Code Status: Full Code           Disposition Plan   Expected discharge: Today, recommended to prior living arrangement once MRI done and neurology and PT evaluation complete.  Entered: Seferino Perez DO 09/03/2020, 12:51 AM     The patient's care was discussed with the Patient and ED provider.    Seferino Perez DO  [facility]    ______________________________________________________________________    Chief Complaint   Dizziness    History is obtained from the patient    History of Present Illness   Priscilla Linda is a 70 year old female with a history of HTN, HLP and asthma who had  surgery on her left foot approximately 6 weeks initially presented to the ED at [facility] for dizziness.  Patient was working outside for approximately 4 hours when she went inside and had a sudden onset of dizziness that she described as the room spinning with associated nausea and 7 episodes of vomiting.  She notes that the dizziness was worsened with movement of the head.  Of note, the patient had an episode of dizziness approximately 10 years ago but does not really remember all the symptoms associated with that.  Of note, the patient does have some mild ringing in her ears but states this has been present for the past year and a half.  She denies any headaches, weakness, numbness, tingling, speech changes or vision changes.  Also denies any fevers, chills, cough, chest pain, SOB, abdominal pain, difficulties with urination, leg pain or leg swelling.    Review of Systems    The 10 point Review of Systems is negative other than noted in the HPI    Past Medical History    I have reviewed this patient's medical history and updated it with pertinent information if needed.   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  Past Surgical History:   Procedure Laterality Date     C NONSPECIFIC PROCEDURE  1999    ganglion cyst     C TOTAL ABDOM HYSTERECTOMY  1995    prolapse, marshal chay/ BSO     CATARACT IOL, RT/LT  2007    Right and Left     COLONOSCOPY  6/2013    polyps, repeat in 3 years     COLONOSCOPY  8/2016    tub adenoma/ repeat 5 years     FOOT SURGERY Right 10/2018    fusion      HC COLONOSCOPY THRU STOMA, DIAGNOSTIC  2003,     hx of iron defic/ two normal colonoscopies     TUBAL LIGATION  1983     UPPER GI ENDOSCOPY  2003    hiatal hernia        Social History   I have reviewed this patient's social history and updated it with pertinent information if needed.  Social History     Tobacco Use     Smoking status: Never Smoker     Smokeless tobacco: Never Used   Substance Use Topics     Alcohol use: Yes     Alcohol/week: 0.0 standard drinks     Comment: very occ     Drug use: No       Family History   I have reviewed this patient's family history and updated it with pertinent information if needed.  Family History   Problem Relation Age of Onset     Diabetes Father          age 75 complications     Hypertension Mother         mild     Diabetes Sister         type 1     Diabetes Brother         aodm in his forties     Blood Disease No family hx of         family hx of hemochromatosis       Prior to Admission Medications   Prior to Admission Medications   Prescriptions Last Dose Informant Patient Reported? Taking?   ASPIRIN NOT PRESCRIBED (INTENTIONAL)   Yes No   Sig: continuous prn for other Antiplatelet medication not prescribed intentionally due to Refusal by patient and Not indicated based on age   MULTI-VITAMIN/IRON OR TABS   Yes No   Si TABLET DAILY   acyclovir (ZOVIRAX) 400 MG tablet   No No   Sig: Take 1 tablet (400 mg) by mouth every 12 hours   acyclovir (ZOVIRAX) 800 MG tablet   No No   Sig: TAKE AS DIRECTED FOR OUTBREAKS   albuterol (PROAIR HFA) 108 (90 Base) MCG/ACT inhaler   No No   Sig: Inhale 1-2 puffs into the lungs every 4 hours as needed   atorvastatin (LIPITOR) 40 MG tablet   No No   Sig: Take 1 tablet (40 mg) by mouth daily   budesonide (PULMICORT FLEXHALER) 180 MCG/ACT inhaler   No No   Sig: Inhale 1 puff into the lungs 2 times daily   metoprolol tartrate (LOPRESSOR) 25 MG tablet   No No   Sig: Take 1 tablet (25 mg) by mouth 2 times daily   omeprazole (PRILOSEC) 20 MG DR capsule   No No   Sig: TAKE ONE CAPSULE BY MOUTH ONE TIME DAILY    triamterene-HCTZ (MAXZIDE-25) 37.5-25 MG tablet   No No   Sig: Take 1 tablet by mouth daily       Facility-Administered Medications: None     Allergies   No Known Allergies    Physical Exam   Vital Signs: Temp: (P) 98.1  F (36.7  C) Temp src: (P) Oral BP: (!) (P) 150/73 Pulse: (P) 76   Resp: (P) 14 SpO2: (P) 99 %      Weight: 0 lbs 0 oz    General Appearance: Resting comfortably.  NAD   Eyes:  Horizontal nystagmus noted.  PERRLA  HEENT: NC/AT.  Moist mucous membranes  Respiratory: Clear to auscultation.  No respiratory distress  Cardiovascular: RRR.  No obvious murmurs  GI: Bowel sounds present.  Non-tender  Skin: No rashes.  No cyanosis  Musculoskeletal: No edema.  No calf tenderness  Neurologic: 5/5 strength to all 4 extremities.  Sensation intact  Psychiatric: Alert and oriented.  Pleasant     Data   Data reviewed today: I reviewed all medications, new labs and imaging results over the last 24 hours. I personally reviewed CT and CTA results as below    EKG:  NSR. Rate 81 BPM.  Right BBB, seen on previous.       Recent Labs   Lab 09/02/20 2036   WBC 12.2*   HGB 15.0   MCV 91      INR 0.94      POTASSIUM 3.3*   CHLORIDE 106   CO2 22   BUN 20   CR 0.86   ANIONGAP 11   AMAN 9.8   *   TROPI <0.015     Recent Results (from the past 24 hour(s))   CT Head w/o Contrast    Narrative    EXAM: CT HEAD W/O CONTRAST  LOCATION: [facility]  DATE/TIME: 9/2/2020 8:38 PM    INDICATION: Code stroke. Sudden onset dizziness with associated nausea, horizontal nystagmus.  COMPARISON: MRI brain dated 12/02/2011.  TECHNIQUE: Routine without IV contrast. Multiplanar reformats. Dose reduction techniques were used.    FINDINGS:  INTRACRANIAL CONTENTS: No intracranial hemorrhage, extraaxial collection, or mass effect. No CT evidence of acute infarct. Moderate presumed chronic small vessel ischemic changes. Mild generalized volume loss. No hydrocephalus.     VISUALIZED ORBITS/SINUSES/MASTOIDS: Prior bilateral cataract surgery. Visualized portions of the orbits are otherwise unremarkable. No paranasal  sinus mucosal disease. No middle ear or mastoid effusion.    BONES/SOFT TISSUES: No acute abnormality.      Impression    IMPRESSION:  1.  No CT evidence for acute intracranial process. ASPECTS score = 10.  2.  Brain atrophy and presumed chronic microvascular ischemic changes as above.   CTA Head Neck with Contrast    Narrative    EXAM: CTA  HEAD NECK WITH CONTRAST  LOCATION: [facility]  DATE/TIME: 9/2/2020 8:38 PM    INDICATION: Code stroke.  COMPARISON: CT head of same day.  CONTRAST: 70 ml isovue 370  TECHNIQUE: Head and neck CT angiogram with IV contrast. Axial helical CT images of the head and neck vessels obtained during the arterial phase of intravenous contrast administration. Axial 2D reconstructed images and multiplanar 3D MIP reconstructed   images of the head and neck vessels were performed by the technologist. Dose reduction techniques were used. All stenosis measurements made according to NASCET criteria unless otherwise specified.    FINDINGS:   HEAD CTA:  ANTERIOR CIRCULATION: No stenosis/occlusion, aneurysm, or high flow vascular malformation. Fetal origin of both posterior cerebral arteries from the anterior circulation.    POSTERIOR CIRCULATION: No stenosis/occlusion, aneurysm, or high flow vascular malformation. 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     DURAL VENOUS SINUSES: Expected enhancement of the major dural venous sinuses.    NECK CTA:  RIGHT CAROTID: No measurable stenosis or dissection.    LEFT CAROTID: Minimal atherosclerosis of the left carotid bifurcation/proximal cervical left internal carotid artery. No measurable stenosis or dissection.    VERTEBRAL ARTERIES: No focal stenosis or dissection. Dominant left and smaller right vertebral arteries.    AORTIC ARCH: Vascular variant aortic arch origin left vertebral artery.  No significant stenosis at the origin of the great  vessels.    NONVASCULAR STRUCTURES: Minimal degenerative changes in the cervical spine and upper thoracic spine. Levoconvex curvature of the lower cervical/upper thoracic spine.      Impression    IMPRESSION:   HEAD CTA:   1.  No significant stenosis, aneurysm, or high flow vascular malformation identified.  2.  Variant [place] anatomy as above.    NECK CTA:  1.  Normal neck CTA.    The findings from the noncontrast head CT and CT angiogram of the head and neck were discussed by myself with Dr. Graham at approximately 8:56 PM on 09/02/2020.   CT Head Perfusion w Contrast    Narrative    EXAM: CT HEAD PERFUSION WITH CONTRAST  LOCATION: [facility]  DATE/TIME: 9/2/2020 8:38 PM    INDICATION: Code stroke.  COMPARISON: CT angiogram of head and neck of same date.  TECHNIQUE: CT cerebral perfusion was performed utilizing a second contrast bolus. Perfusion data were post processed with generation of standard perfusion maps and estimation of ischemic/infarcted volumes utilizing standard threshold values. Dose   reduction techniques were used.  CONTRAST: 50 mL Isovue-370.      Impression    IMPRESSION:   Symmetrical cerebral perfusion. No gross focal asymmetry in cerebral blood flow, volume, or contrast transient time to suggest ischemia/oligemia.

## 2020-09-03 NOTE — ED TRIAGE NOTES
Arrives via EMS with dizziness, vomiting, and headache starting at 4pm today. Hx vertigo, HTN, and migraines.  by EMS. EMS gave 4mg zofran PTA.

## 2020-09-03 NOTE — PLAN OF CARE
Nursing shift note  Admitted from [facility] at 0030. Pt here with dizziness, vomiting, and headache. A&Ox4. Neuros intact ex intermittent horizontal nystagmus. VSS on R/A. Tele SR w/ BBB. Regular diet, thin liquids. Takes pills whole. Up with A1 pivot w/ GB to commode - wears L foot brace when OOB d/t recent foot surgery. Nausea managed w/ IV zofran. Denies pain. Pt scoring green on the Aggression Stop Light Tool. Plan for MRI, will work w/ PT. Discharge pending.     Behavior & Aggression Tool color:  Green    Pt's belongings:   Belongings remain w/ pt.      Home medications: No

## 2020-09-03 NOTE — PLAN OF CARE
Discharge Planner PT   Patient plan for discharge: Home  Current status: Patient seen for initial eval. Patient lives in a house with her . She has been NWB on left foot for six weeks due to left foot surgery. She presented to ER with nausea and vomiting. Currently she denies dizziness. Vestibular eval reveals resting horizontal nystagmus to the left. Oculomotor tests were - and Andre hallpike tests for BPPV all negative. Unable to reproduce patients vertigo but she does have resting nystagmus. Cause unclear from my evaluation. Patient was able to perform bed mobility, transfers and amb 10 feet NWB on left with the walker with just supervision. From PT standpoint she is safe for discharge to home. Consider OP vestibular PT if symptoms return. Currently not a clear vestibular reason for resting nystagmus.   Barriers to return to prior living situation: none with use of walker   Recommendations for discharge: Home   Rationale for recommendations: Patient reports feeling better and able to demonstrate modified independence with bed mobility, transfers and with short distance amb with ww NWB on left. Patient reports she is back to baseline. Could consider OP vestibular PT if symptoms return.        Entered by: Marisel Carlson 09/03/2020 2:27 PM

## 2020-09-03 NOTE — DISCHARGE SUMMARY
[facility]    Discharge Summary  Hospitalist    Date of Admission:  9/3/2020  Date of Discharge:  9/3/2020  Discharging Provider: Ced Brown MD  Date of Service (when I saw the patient): 09/03/20    Discharge Diagnoses   Vertigo  Hypertension   Hyperlipidemia   H/o Asthma    History of Present Illness   Priscilla Linda is a 70 year old female with a history of HTN, HLP and asthma who had surgery on her left foot approximately 6 weeks initially presented to the ED at [facility] for dizziness and was subsequently transferred to [facility] due a lack of bed.    Hospital Course   Priscilla Linda was admitted on 9/3/2020.  The following problems were addressed during her hospitalization:    Vertigo  Initially presented with dizziness, N/V.  Code stroke called and neurology consulted.  CT head and CTA head/neck were essentially normal. Neurology recommended MRI and if normal patient could be discharged to home.  Unfortunately the patient's vertigo was still severe and the decision was made to admit her to observation.   - Admission under observation status.  - MRI brain obtained, no evidence of stroke or tumor.  - Stroke neurology consulted, no further recommendations.  - PT consulted for vestibular evaluation. They were unable to reproduce her symptoms. Etiology of symptoms unclear.  - Symptoms resolved. She feels back to baseline. Has been up in the room without symptoms. Tolerating oral intake.  - Discharge home.  - Follow-up with PCP in one week.  - Discussed reasons to seek medical attention prior to follow-up appointment.  - May use PRN meclizine if she has recurrent symptoms.     Hypertension   Hyperlipidemia   - Continue PTA metoprolol tartrate, triamterene/HCTZ, and atorvastatin.     H/o Asthma  No issues currently.    - Resume PTA pulmicort inhaler and PRN albuterol inhaler upon discharge.    Ced Brown MD    Significant Results and Procedures   As above.    Pending Results   These  results will be followed up by hospitalist.  Unresulted Labs Ordered in the Past 30 Days of this Admission     Date and Time Order Name Status Description    9/2/2020 2113 Asymptomatic COVID-19 Virus (Coronavirus) by PCR In process         Code Status   Full Code       Primary Care Physician   Emanuel Riggs    Physical Exam   Temp: 98.1  F (36.7  C) Temp src: Oral BP: 135/73 Pulse: 70   Resp: 16 SpO2: 95 % O2 Device: None (Room air)    There were no vitals filed for this visit.  Vital Signs with Ranges  Temp:  [97.5  F (36.4  C)-98.1  F (36.7  C)] 98.1  F (36.7  C)  Pulse:  [65-98] 70  Resp:  [9-18] 16  BP: (126-162)/() 135/73  SpO2:  [92 %-99 %] 95 %  No intake/output data recorded.    Constitutional: awake, alert, cooperative, no apparent distress  Respiratory: clear to auscultation bilaterally, no crackles or wheezing  Cardiovascular: regular rate and rhythm, normal S1 and S2, no murmur noted  GI: normal bowel sounds, soft, non-distended, non-tender  Skin: warm, dry  Musculoskeletal: no lower extremity pitting edema present  Neurologic: awake, alert, oriented to name, place and time. cranial nerves II-XII are grossly intact. horizontal nystagmus to the left. motor is 5 out of 5 bilaterally. sensory is intact.    Discharge Disposition   Discharged to home  Condition at discharge: Stable    Consultations This Hospital Stay   NEUROLOGY IP CONSULT  PHYSICAL THERAPY ADULT IP CONSULT    Time Spent on this Encounter   I, Ced Brown MD, personally saw the patient today and spent less than or equal to 30 minutes discharging this patient.    Discharge Orders      Reason for your hospital stay    You were in the hospital due to dizziness. The exact cause is unclear. An MRI of you brain did not reveal any evidence of stroke or tumor. You can use meclizine as directed if your symptoms return. We recommend that you follow up with your primary doctor in about a week. If you develop recurrent symptoms  that do not respond to meclizine or have other neurologic symptoms, we recommend that you seek medical attention.     Follow-up and recommended labs and tests     Follow up with primary care provider, Emanuel Riggs, within 7 days for hospital follow- up.  No follow up labs or test are needed.     Activity    Your activity upon discharge: activity as tolerated     Full Code     Diet    Follow this diet upon discharge: Regular Diet Adult     Discharge Medications   Current Discharge Medication List      START taking these medications    Details   meclizine (ANTIVERT) 25 MG tablet Take 1 tablet (25 mg) by mouth 3 times daily as needed for dizziness  Qty:      Associated Diagnoses: Vertigo         CONTINUE these medications which have NOT CHANGED    Details   acetaminophen (TYLENOL) 500 MG tablet Take 500 mg by mouth 2 times daily as needed for mild pain      albuterol (PROAIR HFA) 108 (90 Base) MCG/ACT inhaler Inhale 1-2 puffs into the lungs every 4 hours as needed  Qty: 1 Inhaler, Refills: 1    Comments: Pharmacy may dispense brand covered by insurance (Proair, or proventil or ventolin or generic albuterol inhaler)  UPDATE REFILLS ONLY  Associated Diagnoses: Mild persistent asthma without complication      atorvastatin (LIPITOR) 40 MG tablet Take 1 tablet (40 mg) by mouth daily  Qty: 90 tablet, Refills: 3    Associated Diagnoses: Hyperlipidemia LDL goal <100      budesonide (PULMICORT FLEXHALER) 180 MCG/ACT inhaler Inhale 1 puff into the lungs 2 times daily  Qty: 2 Inhaler, Refills: 3    Comments: UPDATE REFILLS ONLY  Associated Diagnoses: Mild persistent asthma without complication      ibuprofen (ADVIL/MOTRIN) 200 MG tablet Take 200 mg by mouth 2 times daily as needed for mild pain      metoprolol tartrate (LOPRESSOR) 25 MG tablet Take 1 tablet (25 mg) by mouth 2 times daily  Qty: 180 tablet, Refills: 3    Associated Diagnoses: Essential hypertension, benign      MULTI-VITAMIN/IRON OR TABS 1 TABLET DAILY       omeprazole (PRILOSEC) 20 MG DR capsule TAKE ONE CAPSULE BY MOUTH ONE TIME DAILY   Qty: 90 capsule, Refills: 3    Associated Diagnoses: Gastroesophageal reflux disease, esophagitis presence not specified      triamterene-HCTZ (MAXZIDE-25) 37.5-25 MG tablet Take 1 tablet by mouth daily  Qty: 90 tablet, Refills: 3    Associated Diagnoses: Essential hypertension, benign      ASPIRIN NOT PRESCRIBED (INTENTIONAL) continuous prn for other Antiplatelet medication not prescribed intentionally due to Refusal by patient and Not indicated based on age           Allergies   No Known Allergies     Data   Most Recent 3 CBC's:  Recent Labs   Lab Test 09/02/20 2036 07/13/20  1126 01/27/19  1156   WBC 12.2* 9.5 11.7*   HGB 15.0 15.7 15.6   MCV 91 95.2 88    311 355      Most Recent 3 BMP's:  Recent Labs   Lab Test 09/03/20  0743 09/02/20 2036 07/13/20 01/30/20 01/27/19  1156   NA  --  139 145.1 144.0   < > 143   POTASSIUM 3.9 3.3* 4.39 4.22   < > 3.4   CHLORIDE  --  106 104.2 105.4   < > 110*   CO2  --  22 31.5 29.6   < > 23   BUN  --  20 23  22.8* 22  22.4   < > 18   CR  --  0.86 1.01 0.98   < > 0.84   ANIONGAP  --  11  --   --   --  10   AMAN  --  9.8 10.3 9.8   < > 9.9   GLC  --  186* 100* 102*   < > 128*    < > = values in this interval not displayed.     Most Recent INR's and Anticoagulation Dosing History:  Anticoagulation Dose History     Recent Dosing and Labs Latest Ref Rng & Units 9/2/2020    INR 0.86 - 1.14 0.94        Most Recent 3 Troponin's:  Recent Labs   Lab Test 09/02/20 2036   TROPI <0.015     Results for orders placed or performed during the hospital encounter of 09/03/20   MR Brain w/o & w Contrast    Narrative    MRI BRAIN WITHOUT AND WITH CONTRAST  9/3/2020 1:04 PM    HISTORY:  Transient ischemic attack, initial exam.     TECHNIQUE:  Multiplanar, multisequence MRI of the brain without and  with 8 mL Gadavist.    COMPARISON: Head CT 9/2/2020, head MRI 12/2/2011    FINDINGS:      Nonspecific  patchy and confluent frontoparietal predominant white  matter T2 hyperintensities are present which likely represent advanced  chronic small vessel ischemic change, worsened compared to 2011.  Parenchyma is otherwise unremarkable. No evidence of acute ischemia,  hemorrhage, mass, mass effect, or hydrocephalus. No abnormal  enhancement or diffusion restriction.    The visualized calvarium, tympanic cavities, and mastoid cavities are  unremarkable. Trace paranasal sinus mucosal thickening. Bilateral lens  replacements are present.      Impression    IMPRESSION:  1. No evidence of acute ischemia or hemorrhage.  2. Nonspecific patchy and confluent white matter T2 hyperintensities  which likely represent chronic small-vessel ischemic change as  detailed.    LULÚ RIVERA MD

## 2020-09-03 NOTE — PROGRESS NOTES
"   09/03/20 1147   Quick Adds   Quick Adds Vestibular Eval   Type of Visit Initial PT Evaluation   Living Environment   Lives With spouse   Living Arrangements house   Home Accessibility stairs to enter home;stairs within home   Number of Stairs, Main Entrance 2   Number of Stairs, Within Home, Primary   (Rambler with steps to lower level. Patient on main floor. )   Transportation Anticipated car, drives self;family or friend will provide   Living Environment Comment Patient normally drives but  has been doing it since left foot surgery   Self-Care   Usual Activity Tolerance good   Current Activity Tolerance fair   Activity/Exercise/Self-Care Comment Limited for last 6 weeks due to left foot surgery and NWB   Functional Level Prior   Ambulation 1-->assistive equipment   Transferring 1-->assistive equipment   Toileting 1-->assistive equipment   Bathing 1-->assistive equipment   Prior Functional Level Comment Has needed equipment the past 6 weeks due to left foot surgery. Prior to that patient was independent.    General Information   Onset of Illness/Injury or Date of Surgery - Date 09/03/20   Referring Physician Seferino Perez, DO    Patient/Family Goals Statement Home with assist of .    Pertinent History of Current Problem (include personal factors and/or comorbidities that impact the POC) Patient reports symptoms started suddinly with her head feeling \"weird\". The room \"was just spinning and then I started vomiting.\" Has ringing in ears at baseline with no change. Denies hearing loss.   General Observations Had surgery 6 weeks ago on left foot so currently NWB in boot on left.    Cognitive Status Examination   Orientation orientation to person, place and time   Level of Consciousness alert   Follows Commands and Answers Questions 100% of the time   Personal Safety and Judgment intact   Memory intact   Pain Assessment   Patient Currently in Pain No   Integumentary/Edema   Integumentary/Edema " "no deficits were identifed   Posture    Posture Forward head position   Range of Motion (ROM)   ROM Comment Left foot limited due to recent surgery   Strength   Strength Comments Left foot not assessed. Otherwise, strength grossly 5/5. Some generalized weakness due to limited activity for 6 weeks.    Bed Mobility   Bed Mobility Comments Independent   Transfer Skills   Transfer Comments Modified independent with use of ww   Gait   Gait Comments Modified independent with use of walker.    Balance   Balance Comments Good sitting balance, Good standing balance with support of walker. Steady with amb with walker 10 feet to w/c.    Oculomotor Exam   Smooth Pursuit Normal   Saccades Normal   VOR Normal   VOR Cancellation Normal   Rapid Head Thrust Normal   Infrared Goggle Exam or Frenzel Lense Exam   Exam completed with Infrared Goggles   Spontaneous Nystagmus Horizontal L   Positional Testing Comments Unable to elicit symptoms of vertigo with any positional testing. Left horizontal nystagmus present throughout all positioning.    General Therapy Interventions   Intervention Comments No treatment indicated.    Clinical Impression   Criteria for Skilled Therapeutic Intervention no problems identified which require skilled intervention   PT Diagnosis Spontaneous nystagmus-unsure of cause.    Functional limitations due to impairments Reports she is at baseline with amb with ww NWB. Does not feel balance is impaired.    Clinical Presentation Stable/Uncomplicated   Clinical Presentation Rationale >3 factors affecting mobility.   Clinical Decision Making (Complexity) Moderate complexity   Therapy Frequency   (One time eval only.)   Anticipated Equipment Needs at Discharge   (Has a walker already.)   Anticipated Discharge Disposition Home   Risk & Benefits of therapy have been explained Yes   Patient, Family & other staff in agreement with plan of care Yes   [facility] AM-PAC TM \"6 Clicks\"   2016, Trustees of [employer], under license to [employer].  All rights reserved.   6 Clicks Short Forms Basic Mobility Inpatient Short Form   [facility] AM-PAC  \"6 Clicks\" V.2 Basic Mobility Inpatient Short Form   1. Turning from your back to your side while in a flat bed without using bedrails? 4 - None   2. Moving from lying on your back to sitting on the side of a flat bed without using bedrails? 4 - None   3. Moving to and from a bed to a chair (including a wheelchair)? 4 - None   4. Standing up from a chair using your arms (e.g., wheelchair, or bedside chair)? 4 - None   5. To walk in hospital room? 4 - None   6. Climbing 3-5 steps with a railing? 2 - A Lot   Basic Mobility Raw Score (Score out of 24.Lower scores equate to lower levels of function) 22   Total Evaluation Time   Total Evaluation Time (Minutes) 26     "

## 2020-09-03 NOTE — ED PROVIDER NOTES
History     Chief Complaint:  Dizziness      HPI   Priscilla Linda is a 70 year old female with a history of hypertension and migraines who presents via EMS with dizziness. Per the patient, about four hours ago she developed sudden onset dizziness, vomiting, and headache. She stated her head felt funny and the room was spinning when her head moved. It did not feel like her prior vertigo episode because it is much worse now. EMS noted a blood pressure of about 200. She also endorses nausea and was given Zofran by EMS. She denies confusion, speech difficulty, and a visual disturbance. She has not tried to walk because she is non-weight bearing due to a foot procedure but her  states she could not even sit up. She vomited while at the ER and has a light headache.    Allergies:  No known drug allergies.    Medications:    Zovirax  Lipitor  Lopressor  Prilosec  Maxzide    Past Medical History:    Asthma  Hypertension  Hyperlipidemia  GERD  Ocular Migraine  Left wrist ganglion cyst    Past Surgical History:    Surgery for ganglion cyst  Total abdominal hysterectomy  Cataract IOL, bilateral  Foot surgery, right fusion  Tubal ligation    Family History:    Father: Diabetes  Mother: Hypertension  Sister: Type I diabetes  Brother: Diabetes    Social History:  The patient was accompanied to the ED by her .  Smoking Status: Never Smoker  Smokeless Tobacco: Never Used  Alcohol Use: Positive  Drug Use: Negative  PCP: Emanuel Riggs  Marital Status:        Review of Systems   Eyes: Negative for visual disturbance.   Gastrointestinal: Positive for nausea and vomiting.   Neurological: Positive for headaches. Negative for speech difficulty.   Psychiatric/Behavioral: Negative for confusion.   All other systems reviewed and are negative.    Physical Exam     Patient Vitals for the past 24 hrs:   BP Temp Temp src Pulse Resp SpO2 Weight   09/02/20 2345 128/78 -- -- 68 12 96 % --   09/02/20 2330  139/85 -- -- 69 12 96 % --   09/02/20 2315 137/70 -- -- 75 14 97 % --   09/02/20 2300 (!) 149/93 -- -- 77 15 98 % --   09/02/20 2245 (!) 148/84 -- -- 77 14 97 % --   09/02/20 2230 126/74 -- -- 69 13 96 % --   09/02/20 2215 138/87 -- -- 69 12 95 % --   09/02/20 2200 (!) 154/89 -- -- 73 12 94 % --   09/02/20 2145 (!) 150/89 -- -- 81 12 99 % --   09/02/20 2130 (!) 162/88 -- -- 75 14 97 % --   09/02/20 2125 -- -- -- 78 9 93 % --   09/02/20 2120 (!) 156/86 -- -- 74 14 96 % --   09/02/20 2115 (!) 156/86 -- -- 73 10 96 % --   09/02/20 2110 -- -- -- 77 11 92 % --   09/02/20 2105 -- -- -- 78 13 96 % --   09/02/20 2100 (!) 154/105 -- -- 81 13 99 % --   09/02/20 2049 -- -- -- -- -- -- 76.7 kg (169 lb)   09/02/20 2030 (!) 151/89 -- -- 98 18 -- --   09/02/20 2022 -- 97.6  F (36.4  C) Oral -- -- -- --   09/02/20 2020 (!) 153/94 -- -- 84 -- 98 % --       Physical Exam  General: Resting on the bed, eyes closed, appears nauseated and very uncomfortable.  Skin: Good turgor, no rash, no unusual bruising or prominent lesions.  HEENT: Head: Normocephalic, atraumatic, no visible or palpable masses, depressions, or scarring.  Eyes: Conjunctiva clear, sclera non-icteric, horizontal nystagmus bilaterally.  PERRL.   Throat/pharynx: Mucous membranes moist, no mucosal lesions. Mucosa non-inflamed, no tonsillar hypertrophy or exudate.   Neck: Supple, without lesions or adenopathy.  Cardiac: Normal rate and regular rhythm, no murmur or gallop.   Lungs: Clear to auscultation and percussion   Abdomen: Bowel sounds normal, no tenderness, organomegaly, masses, or hernia. No guarding or rebound tenderness.   Musculoskeletal: Normal gait and station. Full strength in upper and lower extremities.   Neurologic: Alert and oriented x3. GCS 15. CN II-VII intact. Normal finger to nose, rapid hand movements. No pronator drift.   Psychiatric: Intact recent and remote memory, judgment and insight, normal mood and affect.     Emergency Department Course      ECG:  ECG taken at 2021, ECG read at 2030  Normal sinus rhythm  Right bundle branch block  Abnormal ECG  Rate 81 bpm. PA interval 166 ms. QRS duration 140 ms. QT/QTc 444/514 ms. P-R-T axes 48 4 14.    Imaging:  Radiology findings were communicated with the patient who voiced understanding of the findings.    CT Head w/o Contrast  1.  No CT evidence for acute intracranial process. ASPECTS score = 10.  2.  Brain atrophy and presumed chronic microvascular ischemic changes as above.  Reading per radiology.    CT Head Perfusion w Contrast  Symmetrical cerebral perfusion. No gross focal asymmetry in cerebral blood flow, volume, or contrast transient time to suggest ischemia/oligemia.  Reading per radiology.    CTA Head Neck with Contrast  HEAD CTA:   1.  No significant stenosis, aneurysm, or high flow vascular malformation identified.  2.  Variant [place] anatomy as above.  NECK CTA:  1.  Normal neck CTA.  Reading per radiology.    Laboratory:  Laboratory findings were communicated with the patient who voiced understanding of the findings.    CBC: WBC 12.2(H), HGB 15.0,   BMP: Potassium 3.3(L), Glucose 186(H) o/w WNL (Creatinine 0.86)  PTT: 25  INR: 0.94  Troponin (Collected 2036): <0.015    Glucose by Meter: 186(H)    Asymptomatic COVID-19 by PCR: In process    Interventions:  2056: Zofran, 4 mg, IV  2133: Aspirin, 324 mg, Oral  2133: Antivert, 25 mg, Oral  2133: Zofran, 4 mg, IV    Emergency Department Course:    ED Course as of Sep 02 2242   Wed Sep 02, 2020   2021 Nursing notes and vitals reviewed.      2021 EKG obtained in the ED, see results above.        2022 I performed a physical exam of the patient as documented above.      2025 Glucose by meter tested, results above.      2032 I called a code stroke.      2034 I spoke with the stroke neurology service from [facility] regarding patient's presentation, findings, and plan of care.       2036 IV was inserted and blood was drawn for  laboratory testing, results above.       2038 The patient was sent for a CT while in the emergency department, results above.        2040 I spoke with [facility] regarding patient's presentation, findings, and plan of care.       2053 I spoke with [facility] regarding their final CT findings.      2132 Patient swabbed for COVID-19 testing.      2134 Patient rechecked and updated. She is okay with transfer.      2137 I spoke with Dr. Perez of the hospitalist service from [facility] regarding patient's presentation, findings, and plan of care.       2239 Patient rechecked and updated with findings and plan.        Findings and plan explained to the patient. Patient will be transferred to [facility] via EMS. Discussed the case with Dr. Perez, who will admit the patient to a monitored bed for further monitoring, evaluation, and treatment.    Impression & Plan      CMS Diagnoses: The patient has stroke symptoms:         ED Stroke specific documentation           NIHSS PDF     Patient last known well time: 1600  ED Provider first to bedside at: 2022  CT Results received at: 2053    tPA:   Not given due to unclear or unfavorable risk-benefit profile for extended window thrombolysis beyond the conventional 4.5 hour time window.    If treating with tPA: Ensure SBP<185 and DBP<105 prior to treatment with IV tPA.  Administering IV tPA after treatment with IV labetalol, hydralazine, or nicardipine is reasonable once BP control is established.    Endovascular Retrieval:  Not initiated due to absence of proximal vessel occlusion    National Institutes of Health Stroke Scale (Baseline)  Time Performed: 2030     Score    Level of consciousness: (0)   Alert, keenly responsive    LOC questions: (0)   Answers both questions correctly    LOC commands: (0)   Performs both tasks correctly    Best gaze: (0)   Normal    Visual: (0)   No visual loss    Facial palsy: (0)   Normal symmetrical movements     Motor arm (left): (0)   No drift    Motor arm (right): (0)   No drift    Motor leg (left): (0)   No drift    Motor leg (right): (0)   No drift    Limb ataxia: (0)   Absent    Sensory: (0)   Normal- no sensory loss    Best language: (0)   Normal- no aphasia    Dysarthria: (0)   Normal    Extinction and inattention: (0)   No abnormality        Total Score:  0        Stroke Mimics were considered (including migraine headache, seizure disorder, hypoglycemia (or hyperglycemia), head or spinal trauma, CNS infection, Toxin ingestion and shock state (e.g. sepsis) .    Medical Decision Making:  Priscilla Linda is a 70 year old female who presents to the emergency department today for evaluation of dizziness, nausea vomiting.  Details of the patient's history can be known HPI.  Differentials considered include CVA, medication reaction, vertigo, TIA, PE, ACS, hypotension, sepsis, amongst others.  On my exam, patient is obviously uncomfortable, had horizontal nystagmus, multiple episodes of vomiting, but otherwise had an NIHSS score of 0.  No focal deficits were noted.  She was at the very end of the stroke window, however given her sudden onset and prolonged symptoms, code stroke was called.  CTs were obtained as noted above, showing no large vessel occlusion, hemorrhage, etc.  TPA not administered as she was out of the time window, no focal deficit, NIHSS of 0.  No obvious proximal vessel occlusion to pursue endovascular treatment.  Pulmonology's recommendation was admission for MRI studies, symptomatic control. She felt that the MRI studies could be completed inpatient did not need to be completed emergently here in the ER.  I do have high suspicion for vertigo as symptoms appear positional and she has remote history of this.  Regardless, remain symptomatic throughout her stay.  She did improve mildly with meclizine.  Unfortunately, there are no beds available here at [facility].  I therefore consulted with  hospital staff at [facility] will admit the patient into the hospital for further management and care.  She was transferred via via EMS.  She was stable prior to transfer.  All questions were answered.  She was in agreement with transfer and the treatment plan as stated above.    Critical Care time was 30 minutes for this patient excluding procedures.     Covid-19  Priscilla Linda was evaluated during a global COVID-19 pandemic, which necessitated consideration that the patient might be at risk for infection with the SARS-CoV-2 virus that causes COVID-19.   Applicable protocols for evaluation were followed during the patient's care.   COVID-19 was considered as part of the patient's evaluation. The plan for testing is:  a test was obtained during this visit.      Diagnosis:    ICD-10-CM    1. Dizziness  R42    2. Nausea and vomiting  R11.2      Disposition:   Patient was transferred to [facility].    Dragon Disclosure:  This was created at least in part with a voice recognition software. Mistakes/typos may be present.     Scribe Disclosure:  I, Remy Rice, am serving as a scribe at 8:21 PM on 9/2/2020 to document services personally performed by Alison Graham PA based on my observations and the provider's statements to me.    [facility] EMERGENCY DEPARTMENT       Alison Graham PA  09/03/20 0127

## 2020-09-03 NOTE — CONSULTS
"[facility]    Stroke Telephone Note    I was called by VINICIUS Friend on 09/02/20 at 20:36 regarding patient Priscilla Linda. The patient is a 70 year old female with past medical history of hypertension and hyperlipidemia who presented with sudden onset dizziness and room spinning since 1600 with associated nausea.  Horizontal nystagmus noted on exam but nonfocal exam otherwise.    Stroke Code Data  (for stroke code without tele)  Stroke code activated 09/02/20 2032   First stroke provider response  09/02/20 2036   Last known normal 09/02/20 1600   Time of discovery   (or onset of symptoms) 09/02/20   1600   Head CT read by me 09/02/20 2047   Was stroke code de-escalated? Yes 09/02/20    other (see comments) No tPA due to mild symptoms, low NIHSS, and benefits do not outweigh risks of going beyond 4.5 hour window, no LVO for endovascular intervention     TPA Treatment   Not given due to minor/isolated/quickly resolving symptoms and unclear or unfavorable risk-benefit profile for extended window thrombolysis beyond the conventional 4.5 hour time window.    Endovascular Treatment  Not initiated due to absence of proximal vessel occlusion    Impression  Dizziness, central versus peripheral in etiology.    Recommendations  - Symptomatic treatment  - MRI Brain without and with IV contrast to rule out posterior circulation infarction  - If positive start aspirin 325 mg and admit for stroke work up (day team will follow up and make final recommendations)    My recommendations are based on the  information provided on the phone by VINICIUS Friend.    Keke Nixon MD   Neurocritical Care  To page me or covering stroke neurology team member, click here: AMCOM   Choose \"On Call\" tab at top, then search dropdown box for \"Neurology Adult\", select location, press Enter, then look for stroke/neuro ICU/telestroke.           "

## 2020-09-03 NOTE — PHARMACY-ADMISSION MEDICATION HISTORY
Admission medication history interview status for the 9/3/2020  admission is complete. See EPIC admission navigator for prior to admission medications     Medication history source reliability:Good    Actions taken by pharmacist (provider contacted, etc): Verified medications with patient     Additional medication history information not noted on PTA med list :  Verified medications and last date filled through outside med list on file.  Patient stated she had surgery 6 weeks ago. She took Aspirin 81mg x2 for 3 weeks but is not taking anymore.   Senna-Doc, Tramadol 50mg, Ibuprofen 600mg, and Gabapentin 300mg were [facility] [place] (patient does not take anymore)    Medication reconciliation/reorder completed by provider prior to medication history? Yes    Time spent in this activity: 30 mins    Prior to Admission medications    Medication Sig Last Dose Taking? Auth Provider   acetaminophen (TYLENOL) 500 MG tablet Take 500 mg by mouth 2 times daily as needed for mild pain prn at prn Yes Unknown, Entered By History   albuterol (PROAIR HFA) 108 (90 Base) MCG/ACT inhaler Inhale 1-2 puffs into the lungs every 4 hours as needed prn at prn Yes Beatrice Benz MD   atorvastatin (LIPITOR) 40 MG tablet Take 1 tablet (40 mg) by mouth daily 9/1/2020 at pm Yes Beatrice Benz MD   budesonide (PULMICORT FLEXHALER) 180 MCG/ACT inhaler Inhale 1 puff into the lungs 2 times daily 9/2/2020 at am Yes Beatrice Benz MD   ibuprofen (ADVIL/MOTRIN) 200 MG tablet Take 200 mg by mouth 2 times daily as needed for mild pain prn at prn Yes Unknown, Entered By History   metoprolol tartrate (LOPRESSOR) 25 MG tablet Take 1 tablet (25 mg) by mouth 2 times daily 9/2/2020 at am Yes Beatrice Benz MD   MULTI-VITAMIN/IRON OR TABS 1 TABLET DAILY 9/2/2020 at am Yes Reported, Patient   omeprazole (PRILOSEC) 20 MG DR capsule TAKE ONE CAPSULE BY MOUTH ONE TIME DAILY  9/1/2020 at pm Yes Tuyet  Beatrice WEEMS MD   triamterene-HCTZ (MAXZIDE-25) 37.5-25 MG tablet Take 1 tablet by mouth daily 9/2/2020 at am Yes Beatrice Benz MD   ASPIRIN NOT PRESCRIBED (INTENTIONAL) continuous prn for other Antiplatelet medication not prescribed intentionally due to Refusal by patient and Not indicated based on age   Reported, Patient

## 2020-09-03 NOTE — PLAN OF CARE
9/3/20 7a to 3p. A&O, calm and cooperative, VSS on RA, on Regular diet, with good appetite, Denies Pain, Nausea and Dizziness, Continent, Up with A1 GB/W, uses crutches L foot brace during ambulation, baseline, Neuros are intact, Tele SR with BBB, MRI done with negative results, For discharge later this PM.

## 2020-09-10 ENCOUNTER — CARE COORDINATION (OUTPATIENT)
Dept: FAMILY MEDICINE | Facility: CLINIC | Age: 70
End: 2020-09-10

## 2020-09-10 ENCOUNTER — OFFICE VISIT (OUTPATIENT)
Dept: FAMILY MEDICINE | Facility: CLINIC | Age: 70
End: 2020-09-10

## 2020-09-10 VITALS
DIASTOLIC BLOOD PRESSURE: 80 MMHG | OXYGEN SATURATION: 98 % | HEART RATE: 71 BPM | TEMPERATURE: 98.4 F | SYSTOLIC BLOOD PRESSURE: 130 MMHG | BODY MASS INDEX: 30.75 KG/M2 | WEIGHT: 172.2 LBS

## 2020-09-10 DIAGNOSIS — R42 DIZZINESS: Primary | ICD-10-CM

## 2020-09-10 DIAGNOSIS — I65.22 ASYMPTOMATIC STENOSIS OF LEFT CAROTID ARTERY: ICD-10-CM

## 2020-09-10 DIAGNOSIS — I10 ESSENTIAL HYPERTENSION, BENIGN: ICD-10-CM

## 2020-09-10 DIAGNOSIS — Z82.3 FAMILY HISTORY OF STROKE: ICD-10-CM

## 2020-09-10 DIAGNOSIS — I67.82 CEREBRAL ISCHEMIA: ICD-10-CM

## 2020-09-10 DIAGNOSIS — R42 VERTIGO: ICD-10-CM

## 2020-09-10 LAB
BUN SERPL-MCNC: 21 MG/DL (ref 7–25)
BUN/CREATININE RATIO: 24.1 (ref 6–22)
CALCIUM SERPL-MCNC: 10.3 MG/DL (ref 8.6–10.3)
CHLORIDE SERPLBLD-SCNC: 103.1 MMOL/L (ref 98–110)
CO2 SERPL-SCNC: 30.8 MMOL/L (ref 20–32)
CREAT SERPL-MCNC: 0.87 MG/DL (ref 0.7–1.18)
GLUCOSE SERPL-MCNC: 118 MG/DL (ref 60–99)
POTASSIUM SERPL-SCNC: 3.61 MMOL/L (ref 3.5–5.3)
SODIUM SERPL-SCNC: 141.2 MMOL/L (ref 135–146)

## 2020-09-10 PROCEDURE — 99213 OFFICE O/P EST LOW 20 MIN: CPT | Performed by: PHYSICIAN ASSISTANT

## 2020-09-10 PROCEDURE — 36415 COLL VENOUS BLD VENIPUNCTURE: CPT | Performed by: PHYSICIAN ASSISTANT

## 2020-09-10 PROCEDURE — 80048 BASIC METABOLIC PNL TOTAL CA: CPT | Performed by: PHYSICIAN ASSISTANT

## 2020-09-10 RX ORDER — ATORVASTATIN CALCIUM 80 MG/1
80 TABLET, FILM COATED ORAL DAILY
Qty: 90 TABLET | Refills: 3 | Status: SHIPPED | OUTPATIENT
Start: 2020-09-10 | End: 2021-09-09

## 2020-09-10 NOTE — PROGRESS NOTES
"Subjective     Priscilla Linda is a 70 year old female who presents to clinic today for the following health issues:    [facility]         Hospital Follow-up Visit:    Hospital[facility]Nursing [facility]IP [facility]  Date of Admission: 09/02/2020  Date of Discharge: 09/03/2020  Reason(s) for Admission: Possible stroke/vertigo      Was your hospitalization related to COVID-19? No   Problems taking medications regularly:  None  Medication changes since discharge: None  Problems adhering to non-medication therapy:  None    Summary of hospitalization:  [facility] discharge summary reviewed  Diagnostic Tests/Treatments reviewed.  Follow up needed: will refer to neurologist  Other Healthcare Providers Involved in Patient s Care:         None  Update since discharge: Still felt \"off\" for a few days after discharge, took Dramamine one day upon discharge  Post Discharge Medication Reconciliation: discharge medications reconciled, continue medications without change.  Plan of care communicated with patient    2 years ago developed tinnitis   Didn't worsen during episode of dizziness    Did have episode of BPPV in 2011    Father with CVA hx.                     Review of Systems   Constitutional, HEENT, cardiovascular, pulmonary, gi and gu systems are negative, except as otherwise noted.      Objective    /80 (BP Location: Right arm, Patient Position: Sitting, Cuff Size: Adult Large)   Pulse 71   Temp 98.4  F (36.9  C) (Oral)   Wt 78.1 kg (172 lb 3.2 oz)   LMP  (LMP Unknown)   SpO2 98%   BMI 30.75 kg/m    Body mass index is 30.75 kg/m .  Physical Exam   GENERAL: healthy, alert and no distress  EYES: Eyes grossly normal to inspection, PERRL and conjunctivae and sclerae normal  HENT: ear canals and TM's normal, nose and mouth without ulcers or lesions  NECK: no adenopathy, no asymmetry, masses, or scars and thyroid normal to palpation  RESP: lungs clear to auscultation - no rales, " rhonchi or wheezes  CV: regular rate and rhythm, normal S1 S2, no S3 or S4, no murmur, click or rub, no peripheral edema and peripheral pulses strong  MS: no gross musculoskeletal defects noted, no edema  SKIN: no suspicious lesions or rashes  PSYCH: mentation appears normal, affect normal/bright    Neuro: CN II - XII intact  Gait:  Normal gait  Test strength in the following muscles bilaterally: biceps, Triceps, hip flexors, knee flexor/extensors, ankle dorsiflexors and plantarflexors are all normal.   Normal: Test for a pronator drift. Test finger tapping, nose to finger, and heel-knee-shin performance.   Pupils are round, reactive to light, EOM intact in all directions.             No results found for this or any previous visit (from the past 24 hour(s)).        Assessment & Plan     1. Dizziness    2. Asymptomatic stenosis of left carotid artery - US 9/21 recommended    3. Essential hypertension, benign    4. Family history of stroke    5. Vertigo    6. Cerebral ischemia- small vessel seen on MRI 2020      Most likely BPPV but unusual presentation.    Sx now resolved.     DD includes TIA/CVA/BPPV/Menieres as well as others  Due to small vessel ischemia and carotid plaque - I increased atorvastatin to 80 mg and added 81 mg ASA daily. Lab only fasting recheck 6 weeks.    I have recommended Neuro consult for further evaluation and workup if needed  Signs/sx of stroke reviewed  Call 911 if these occur.          VINICIUS Lopez  [facility]

## 2020-09-10 NOTE — PROGRESS NOTES
Care Coordination Assessment    PCP: Emanuel Riggs    Referral Source:  ED/IP List    Clinical Data: Patient discharged from inpatient at [facility] 09/03/2020 with vertigo.  Patient discharged home with instruction to follow up with PCP in 1 week.    Plan: Patient has already followed up.  I will close encounter

## 2020-09-11 ENCOUNTER — TRANSFERRED RECORDS (OUTPATIENT)
Dept: FAMILY MEDICINE | Facility: CLINIC | Age: 70
End: 2020-09-11

## 2020-09-11 ENCOUNTER — MYC MEDICAL ADVICE (OUTPATIENT)
Dept: FAMILY MEDICINE | Facility: CLINIC | Age: 70
End: 2020-09-11

## 2020-09-14 ENCOUNTER — TRANSFERRED RECORDS (OUTPATIENT)
Dept: FAMILY MEDICINE | Facility: CLINIC | Age: 70
End: 2020-09-14

## 2020-09-21 ENCOUNTER — TRANSFERRED RECORDS (OUTPATIENT)
Dept: FAMILY MEDICINE | Facility: CLINIC | Age: 70
End: 2020-09-21

## 2020-10-08 ENCOUNTER — TRANSFERRED RECORDS (OUTPATIENT)
Dept: FAMILY MEDICINE | Facility: CLINIC | Age: 70
End: 2020-10-08

## 2020-10-15 ENCOUNTER — TRANSFERRED RECORDS (OUTPATIENT)
Dept: FAMILY MEDICINE | Facility: CLINIC | Age: 70
End: 2020-10-15

## 2020-10-20 DIAGNOSIS — I65.22 ASYMPTOMATIC STENOSIS OF LEFT CAROTID ARTERY: ICD-10-CM

## 2020-10-20 LAB
ALT 1742-6: 17 U/L (ref 0–32)
CHOLEST SERPL-MCNC: 204 MG/DL (ref 0–199)
CHOLEST/HDLC SERPL: 3 {RATIO} (ref 0–5)
HDLC SERPL-MCNC: 79 MG/DL (ref 40–150)
LDLC SERPL CALC-MCNC: 91 MG/DL (ref 0–130)
TRIGL SERPL-MCNC: 168 MG/DL (ref 0–149)

## 2020-10-20 PROCEDURE — 84460 ALANINE AMINO (ALT) (SGPT): CPT | Performed by: PHYSICIAN ASSISTANT

## 2020-10-20 PROCEDURE — 36415 COLL VENOUS BLD VENIPUNCTURE: CPT | Performed by: PHYSICIAN ASSISTANT

## 2020-10-20 PROCEDURE — 80061 LIPID PANEL: CPT | Performed by: PHYSICIAN ASSISTANT

## 2020-12-03 ENCOUNTER — OFFICE VISIT (OUTPATIENT)
Dept: FAMILY MEDICINE | Facility: CLINIC | Age: 70
End: 2020-12-03

## 2020-12-03 VITALS
TEMPERATURE: 98.3 F | OXYGEN SATURATION: 98 % | BODY MASS INDEX: 30.35 KG/M2 | DIASTOLIC BLOOD PRESSURE: 68 MMHG | SYSTOLIC BLOOD PRESSURE: 120 MMHG | HEART RATE: 61 BPM | WEIGHT: 170 LBS

## 2020-12-03 DIAGNOSIS — G43.109 MIGRAINE WITH AURA AND WITHOUT STATUS MIGRAINOSUS, NOT INTRACTABLE: ICD-10-CM

## 2020-12-03 DIAGNOSIS — K21.9 GASTROESOPHAGEAL REFLUX DISEASE, UNSPECIFIED WHETHER ESOPHAGITIS PRESENT: Primary | ICD-10-CM

## 2020-12-03 DIAGNOSIS — I67.82 CEREBRAL ISCHEMIA: ICD-10-CM

## 2020-12-03 DIAGNOSIS — I65.22 ASYMPTOMATIC STENOSIS OF LEFT CAROTID ARTERY: ICD-10-CM

## 2020-12-03 DIAGNOSIS — J45.30 MILD PERSISTENT ASTHMA WITHOUT COMPLICATION: ICD-10-CM

## 2020-12-03 DIAGNOSIS — I10 ESSENTIAL HYPERTENSION, BENIGN: ICD-10-CM

## 2020-12-03 PROCEDURE — 99213 OFFICE O/P EST LOW 20 MIN: CPT | Performed by: PHYSICIAN ASSISTANT

## 2020-12-03 RX ORDER — METOPROLOL TARTRATE 25 MG/1
25 TABLET, FILM COATED ORAL 2 TIMES DAILY
Qty: 180 TABLET | Refills: 3 | Status: SHIPPED | OUTPATIENT
Start: 2020-12-03 | End: 2021-09-14

## 2020-12-03 RX ORDER — CALCIUM CARBONATE 500 MG/1
1 TABLET, CHEWABLE ORAL DAILY
COMMUNITY
End: 2022-10-26

## 2020-12-03 RX ORDER — MULTIVITAMIN WITH IRON
1 TABLET ORAL 2 TIMES DAILY
COMMUNITY
End: 2022-10-26

## 2020-12-03 RX ORDER — TRIAMTERENE/HYDROCHLOROTHIAZID 37.5-25 MG
1 TABLET ORAL DAILY
Qty: 90 TABLET | Refills: 3 | Status: SHIPPED | OUTPATIENT
Start: 2020-12-03 | End: 2021-09-14

## 2020-12-03 RX ORDER — BUDESONIDE 180 UG/1
1 AEROSOL, POWDER RESPIRATORY (INHALATION) 2 TIMES DAILY
Qty: 1 INHALER | Refills: 12 | Status: SHIPPED | OUTPATIENT
Start: 2020-12-03 | End: 2021-09-14

## 2020-12-03 RX ORDER — RIBOFLAVIN (VITAMIN B2) 100 MG
25 TABLET ORAL DAILY
COMMUNITY
End: 2022-10-26

## 2020-12-03 NOTE — PROGRESS NOTES
Subjective     Nursing Notes:   Amaya Montejo, JUAN  12/3/2020 12:42 PM  Signed  Scooter is here for a nonfasting med check.    Pre-Visit Screening:  Immunizations:UTD  Colonoscopy:UTD  Mammogram:UTD  Asthma Action Test/Plan:today  PHQ9:NA  GAD7:Na  Questioned patient about current smoking habits Pt.never smoked  OK to leave a detailed message on voice mail for today's visit yes, phone # [phone]          Priscilla Linda is a 70 year old female who presents to clinic today for the following health issues:    HPI         Follow-up after ED visit 9/10.  Since that time saw neurologist  Dg with migraine   500 Naproxen + Benadryl 25 mg      Mild HELLEN diagnosed per Neurologist  CPAP advised  Using for 3 weeks now  Tina at neurologist recheck in     Needs refills on omeprazole  Several years  Ranitidine didn't help.  Many years ago had endoscopy      Pain in right hip 10 months  Saw [facility] last week  Xrays - no arthritis  Injection completed  PT ordered for hip and back   Met with NP     Starting Vit D3 2,000 international unit(s) daily    Calcium carbonate advised.         Review of Systems   Constitutional, HEENT, cardiovascular, pulmonary, gi and gu systems are negative, except as otherwise noted.      Patient Active Problem List   Diagnosis     Mixed hyperlipidemia     Ocular migraine     Herpes simplex virus (HSV) infection     Essential hypertension, benign     Health Care Home     Ganglion cyst of wrist, left     Vertigo     Asymptomatic stenosis of left carotid artery - US 9/21 recommended     Cerebral ischemia- small vessel seen on MRI 2020     Mild persistent asthma without complication     Migraine with aura and without status migrainosus, not intractable     Past Surgical History:   Procedure Laterality Date     C TOTAL ABDOM HYSTERECTOMY  1995    prolapse, marshal chay/ BSO     CATARACT IOL, RT/LT  2007    Right and Left     COLONOSCOPY  6/2013    polyps, repeat in 3 years     COLONOSCOPY   2016    tub adenoma/ repeat 5 years     FOOT SURGERY Right 10/2018    fusion      HC COLONOSCOPY THRU STOMA, DIAGNOSTIC  ,     hx of iron defic/ two normal colonoscopies     TUBAL LIGATION       UPPER GI ENDOSCOPY      hiatal hernia     ZZC NONSPECIFIC PROCEDURE      ganglion cyst       Social History     Tobacco Use     Smoking status: Never Smoker     Smokeless tobacco: Never Used   Substance Use Topics     Alcohol use: Yes     Alcohol/week: 0.0 standard drinks     Comment: very occ     Family History   Problem Relation Age of Onset     Diabetes Father          age 75 complications     Hypertension Mother         mild     Diabetes Sister         type 1     Diabetes Brother         aodm in his forties     Blood Disease No family hx of         family hx of hemochromatosis           Current Outpatient Medications   Medication Sig Dispense Refill     acetaminophen (TYLENOL) 500 MG tablet Take 500 mg by mouth 2 times daily as needed for mild pain       albuterol (PROAIR HFA) 108 (90 Base) MCG/ACT inhaler Inhale 1-2 puffs into the lungs every 4 hours as needed 1 Inhaler 1     ASPIRIN NOT PRESCRIBED (INTENTIONAL) continuous prn for other Antiplatelet medication not prescribed intentionally due to Refusal by patient and Not indicated based on age       atorvastatin (LIPITOR) 80 MG tablet Take 1 tablet (80 mg) by mouth daily 90 tablet 3     budesonide (PULMICORT FLEXHALER) 180 MCG/ACT inhaler Inhale 1 puff into the lungs 2 times daily 1 Inhaler 12     calcium carbonate (TUMS) 500 MG chewable tablet Take 1 chew tab by mouth daily       ibuprofen (ADVIL/MOTRIN) 200 MG tablet Take 200 mg by mouth 2 times daily as needed for mild pain       magnesium 250 MG tablet Take 1 tablet by mouth 2 times daily       metoprolol tartrate (LOPRESSOR) 25 MG tablet Take 1 tablet (25 mg) by mouth 2 times daily 180 tablet 3     MULTI-VITAMIN/IRON OR TABS 1 TABLET DAILY       omeprazole (PRILOSEC) 20 MG DR capsule Take 1  capsule (20 mg) by mouth daily 90 capsule 3     riboflavin (VITAMIN  B-2) 100 MG TABS tablet Take 25 mg by mouth daily       triamterene-HCTZ (MAXZIDE-25) 37.5-25 MG tablet Take 1 tablet by mouth daily 90 tablet 3     No Known Allergies  Recent Labs   Lab Test 10/20/20  1041 09/10/20 09/03/20  0743 09/02/20  2036 01/30/20  0000 01/30/20 09/16/19 03/20/19  0805 03/20/19  0805 01/27/19  1156 01/27/19  1156 07/12/18  0855 07/12/18  0855 01/11/17  0824 01/11/17  0824 01/15/16  0902 01/15/16  0902   LDL 91  --   --   --   --  107 138*   < >  --   --   --   --  130*   < > 143*  --  133*   HDL 79  --   --   --   --  81 82   < >  --   --   --   --  94   < > 85  --  90   TRIG 168*  --   --   --   --  187* 186*   < >  --   --   --   --  189*   < > 200*  --  155*   ALT  --   --   --   --   --   --   --   --   --   --   --   --  25  --  38*  --  42*   CR  --  0.87  --  0.86   < > 0.98  --   --  0.85   < > 0.84   < > 0.79   < > 0.89   < >  --    GFRESTIMATED  --   --   --  69  --   --   --   --  71   < > 72   < > 78   < > 68   < >  --    GFRESTBLACK  --   --   --  79  --   --   --   --   --   --  84  --   --   --   --   --   --    POTASSIUM  --  3.61 3.9 3.3*   < > 4.22  --   --  3.9   < > 3.4   < > 3.8   < > 4.8   < >  --     < > = values in this interval not displayed.        BP Readings from Last 3 Encounters:   12/03/20 120/68   09/10/20 130/80   09/03/20 120/68    Wt Readings from Last 3 Encounters:   12/03/20 77.1 kg (170 lb)   09/10/20 78.1 kg (172 lb 3.2 oz)   09/02/20 76.7 kg (169 lb)              Objective    /68 (BP Location: Right arm, Patient Position: Sitting, Cuff Size: Adult Large)   Pulse 61   Temp 98.3  F (36.8  C) (Oral)   Wt 77.1 kg (170 lb)   LMP  (LMP Unknown)   SpO2 98%   BMI 30.35 kg/m    Body mass index is 30.35 kg/m .  Physical Exam     GENERAL: healthy, alert and no distress  Head: Normocephalic, atraumatic.  RESP: lungs clear to auscultation - no rales, rhonchi or wheezes  CV: regular rate  and rhythm, normal S1 S2, no S3 or S4, no murmur, click or rub, no peripheral edema and peripheral pulses strong                Assessment & Plan   1. Gastroesophageal reflux disease, unspecified whether esophagitis present  AE reviewed  - omeprazole (PRILOSEC) 20 MG DR capsule; Take 1 capsule (20 mg) by mouth daily  Dispense: 90 capsule; Refill: 3    2. Essential hypertension, benign    - triamterene-HCTZ (MAXZIDE-25) 37.5-25 MG tablet; Take 1 tablet by mouth daily  Dispense: 90 tablet; Refill: 3  - metoprolol tartrate (LOPRESSOR) 25 MG tablet; Take 1 tablet (25 mg) by mouth 2 times daily  Dispense: 180 tablet; Refill: 3    3. Mild persistent asthma without complication    - budesonide (PULMICORT FLEXHALER) 180 MCG/ACT inhaler; Inhale 1 puff into the lungs 2 times daily  Dispense: 1 Inhaler; Refill: 12    4. Migraine with aura and without status migrainosus, not intractable      5. Cerebral ischemia- small vessel seen on MRI 2020      6. Asymptomatic stenosis of left carotid artery - US 9/21 recommended         Follow-up FASTING 1 YEAR    VINICIUS Lopez  [facility]

## 2020-12-03 NOTE — LETTER
My Asthma Action Plan    Name: Priscilla Linda   YOB: 1950  Date: 12/3/2020   My doctor: VINICIUS Lopez   My clinic: [facility]        My Control Medicine: Budesonide (Pulmicort Flexhaler) -  180 mcg bid  My Rescue Medicine: Albuterol (Proair/Ventolin/Proventil HFA) 2-4 puffs EVERY 4 HOURS as needed. Use a spacer if recommended by your provider.  My Oral Steroid Medicine: none My Asthma Severity:   Moderate Persistent  Know your asthma triggers: upper respiratory infections              GREEN ZONE   Good Control    I feel good    No cough or wheeze    Can work, sleep and play without asthma symptoms       Take your asthma control medicine every day.     1. If exercise triggers your asthma, take your rescue medication    15 minutes before exercise or sports, and    During exercise if you have asthma symptoms  2. Spacer to use with inhaler: If you have a spacer, make sure to use it with your inhaler             YELLOW ZONE Getting Worse  I have ANY of these:    I do not feel good    Cough or wheeze    Chest feels tight    Wake up at night   1. Keep taking your Green Zone medications  2. Start taking your rescue medicine:    every 20 minutes for up to 1 hour. Then every 4 hours for 24-48 hours.  3. If you stay in the Yellow Zone for more than 12-24 hours, contact your doctor.  4. If you do not return to the Green Zone in 12-24 hours or you get worse, start taking your oral steroid medicine if prescribed by your provider.           RED ZONE Medical Alert - Get Help  I have ANY of these:    I feel awful    Medicine is not helping    Breathing getting harder    Trouble walking or talking    Nose opens wide to breathe       1. Take your rescue medicine NOW  2. If your provider has prescribed an oral steroid medicine, start taking it NOW  3. Call your doctor NOW  4. If you are still in the Red Zone after 20 minutes and you have not reached your doctor:    Take your rescue  medicine again and    Call 911 or go to the emergency room right away    See your regular doctor within 2 weeks of an Emergency Room or Urgent Care visit for follow-up treatment.          Annual Reminders:  Meet with Asthma Educator,  Flu Shot in the Fall, consider Pneumonia Vaccination for patients with asthma (aged 19 and older).    Pharmacy: [id number] [place], [place] - [address] [address]    Electronically signed by VINICIUS Lopez   Date: 12/03/20                      Asthma Triggers  How To Control Things That Make Your Asthma Worse    Triggers are things that make your asthma worse.  Look at the list below to help you find your triggers and what you can do about them.  You can help prevent asthma flare-ups by staying away from your triggers.      Trigger                                                          What you can do   Cigarette Smoke  Tobacco smoke can make asthma worse. Do not allow smoking in your home, car or around you.  Be sure no one smokes at a child s day care or school.  If you smoke, ask your health care provider for ways to help you quit.  Ask family members to quit too.  Ask your health care provider for a referral to Quit Plan to help you quit smoking, or call 5-501-701-PLAN.     Colds, Flu, Bronchitis  These are common triggers of asthma. Wash your hands often.  Don t touch your eyes, nose or mouth.  Get a flu shot every year.     Dust Mites  These are tiny bugs that live in cloth or carpet. They are too small to see. Wash sheets and blankets in hot water every week.   Encase pillows and mattress in dust mite proof covers.  Avoid having carpet if you can. If you have carpet, vacuum weekly.   Use a dust mask and HEPA vacuum.   Pollen and Outdoor Mold  Some people are allergic to trees, grass, or weed pollen, or molds. Try to keep your windows closed.  Limit time out doors when pollen count is high.   Ask you health care provider about taking medicine during  allergy season.     Animal Dander  Some people are allergic to skin flakes, urine or saliva from pets with fur or feathers. Keep pets with fur or feathers out of your home.    If you can t keep the pet outdoors, then keep the pet out of your bedroom.  Keep the bedroom door closed.  Keep pets off cloth furniture and away from stuffed toys.     Mice, Rats, and Cockroaches   Some people are allergic to the waste from these pests.   Cover food and garbage.  Clean up spills and food crumbs.  Store grease in the refrigerator.   Keep food out of the bedroom.   Indoor Mold  This can be a trigger if your home has high moisture. Fix leaking faucets, pipes, or other sources of water.   Clean moldy surfaces.  Dehumidify basement if it is damp and smelly.   Smoke, Strong Odors, and Sprays  These can reduce air quality. Stay away from strong odors and sprays, such as perfume, powder, hair spray, paints, smoke incense, paint, cleaning products, candles and new carpet.   Exercise or Sports  Some people with asthma have this trigger. Be active!  Ask your doctor about taking medicine before sports or exercise to prevent symptoms.    Warm up for 5-10 minutes before and after sports or exercise.     Other Triggers of Asthma  Cold air:  Cover your nose and mouth with a scarf.  Sometimes laughing or crying can be a trigger.  Some medicines and food can trigger asthma.

## 2020-12-03 NOTE — NURSING NOTE
Scooter is here for a nonfasting med check.    Pre-Visit Screening:  Immunizations:UTD  Colonoscopy:UTD  Mammogram:UTD  Asthma Action Test/Plan:today  PHQ9:NA  GAD7:Na  Questioned patient about current smoking habits Pt.never smoked  OK to leave a detailed message on voice mail for today's visit yes, phone # [phone]

## 2020-12-04 ENCOUNTER — TRANSFERRED RECORDS (OUTPATIENT)
Dept: FAMILY MEDICINE | Facility: CLINIC | Age: 70
End: 2020-12-04

## 2020-12-04 ASSESSMENT — ASTHMA QUESTIONNAIRES: ACT_TOTALSCORE: 25

## 2021-01-06 ENCOUNTER — HOSPITAL ENCOUNTER (OUTPATIENT)
Dept: MAMMOGRAPHY | Facility: CLINIC | Age: 71
Discharge: HOME | End: 2021-01-06
Attending: PHYSICIAN ASSISTANT | Admitting: PHYSICIAN ASSISTANT
Payer: MEDICARE

## 2021-01-06 DIAGNOSIS — Z12.31 VISIT FOR SCREENING MAMMOGRAM: ICD-10-CM

## 2021-01-06 PROCEDURE — 77063 BREAST TOMOSYNTHESIS BI: CPT

## 2021-01-15 ENCOUNTER — HEALTH MAINTENANCE LETTER (OUTPATIENT)
Age: 71
End: 2021-01-15

## 2021-01-18 ENCOUNTER — TRANSFERRED RECORDS (OUTPATIENT)
Dept: FAMILY MEDICINE | Facility: CLINIC | Age: 71
End: 2021-01-18

## 2021-01-22 ENCOUNTER — TELEPHONE (OUTPATIENT)
Dept: FAMILY MEDICINE | Facility: CLINIC | Age: 71
End: 2021-01-22

## 2021-01-22 ENCOUNTER — OFFICE VISIT (OUTPATIENT)
Dept: FAMILY MEDICINE | Facility: CLINIC | Age: 71
End: 2021-01-22

## 2021-01-22 VITALS
DIASTOLIC BLOOD PRESSURE: 74 MMHG | SYSTOLIC BLOOD PRESSURE: 132 MMHG | HEART RATE: 60 BPM | BODY MASS INDEX: 30.76 KG/M2 | RESPIRATION RATE: 20 BRPM | WEIGHT: 173.6 LBS | HEIGHT: 63 IN | TEMPERATURE: 97.4 F

## 2021-01-22 DIAGNOSIS — I67.82 CEREBRAL ISCHEMIA: ICD-10-CM

## 2021-01-22 DIAGNOSIS — J45.30 MILD PERSISTENT ASTHMA WITHOUT COMPLICATION: ICD-10-CM

## 2021-01-22 DIAGNOSIS — I65.22 ASYMPTOMATIC STENOSIS OF LEFT CAROTID ARTERY: ICD-10-CM

## 2021-01-22 DIAGNOSIS — G47.33 OSA (OBSTRUCTIVE SLEEP APNEA): ICD-10-CM

## 2021-01-22 DIAGNOSIS — M67.432 GANGLION CYST OF WRIST, LEFT: ICD-10-CM

## 2021-01-22 DIAGNOSIS — B00.9 HERPES SIMPLEX VIRUS (HSV) INFECTION: ICD-10-CM

## 2021-01-22 DIAGNOSIS — G43.109 OCULAR MIGRAINE: ICD-10-CM

## 2021-01-22 DIAGNOSIS — E78.2 MIXED HYPERLIPIDEMIA: ICD-10-CM

## 2021-01-22 DIAGNOSIS — M19.072 ARTHRITIS OF LEFT FOOT: ICD-10-CM

## 2021-01-22 DIAGNOSIS — G43.109 MIGRAINE WITH AURA AND WITHOUT STATUS MIGRAINOSUS, NOT INTRACTABLE: ICD-10-CM

## 2021-01-22 DIAGNOSIS — Z01.818 PREOP GENERAL PHYSICAL EXAM: Primary | ICD-10-CM

## 2021-01-22 DIAGNOSIS — I10 ESSENTIAL HYPERTENSION, BENIGN: ICD-10-CM

## 2021-01-22 LAB
BUN SERPL-MCNC: 19 MG/DL (ref 7–25)
BUN/CREATININE RATIO: 19.2 (ref 6–22)
CALCIUM SERPL-MCNC: 10.2 MG/DL (ref 8.6–10.3)
CHLORIDE SERPLBLD-SCNC: 103.1 MMOL/L (ref 98–110)
CO2 SERPL-SCNC: 30.2 MMOL/L (ref 20–32)
CREAT SERPL-MCNC: 0.99 MG/DL (ref 0.6–1.3)
ERYTHROCYTE [DISTWIDTH] IN BLOOD BY AUTOMATED COUNT: 11.9 %
GLUCOSE SERPL-MCNC: 97 MG/DL (ref 60–99)
HCT VFR BLD AUTO: 43.4 % (ref 35–47)
HEMOGLOBIN: 14.5 G/DL (ref 11.7–15.7)
MCH RBC QN AUTO: 30.9 PG (ref 26–33)
MCHC RBC AUTO-ENTMCNC: 33.4 G/DL (ref 31–36)
MCV RBC AUTO: 92.4 FL (ref 78–100)
PLATELET COUNT - QUEST: 325 10^9/L (ref 150–375)
POTASSIUM SERPL-SCNC: 4.25 MMOL/L (ref 3.5–5.3)
RBC # BLD AUTO: 4.7 10*12/L (ref 3.8–5.2)
SODIUM SERPL-SCNC: 141.7 MMOL/L (ref 135–146)
WBC # BLD AUTO: 9.6 10*9/L (ref 4–11)

## 2021-01-22 PROCEDURE — 93000 ELECTROCARDIOGRAM COMPLETE: CPT | Performed by: PHYSICIAN ASSISTANT

## 2021-01-22 PROCEDURE — 85027 COMPLETE CBC AUTOMATED: CPT | Performed by: PHYSICIAN ASSISTANT

## 2021-01-22 PROCEDURE — 36415 COLL VENOUS BLD VENIPUNCTURE: CPT | Performed by: PHYSICIAN ASSISTANT

## 2021-01-22 PROCEDURE — 80048 BASIC METABOLIC PNL TOTAL CA: CPT | Performed by: PHYSICIAN ASSISTANT

## 2021-01-22 PROCEDURE — 99214 OFFICE O/P EST MOD 30 MIN: CPT | Performed by: PHYSICIAN ASSISTANT

## 2021-01-22 ASSESSMENT — MIFFLIN-ST. JEOR: SCORE: 1273.63

## 2021-01-22 NOTE — PROGRESS NOTES
[facility]  [address]  [address]  [facility] [address]  Phone: [phone]  Fax: [phone]    1/22/2021    Adult PRE-OP Evaluation:    Priscilla OLGUIN Joseflyndsay, 1950 presents for pre-operative evaluation and assessment as requested by Dr. Wen, prior to undergoing surgery/procedure for treatment of  Left foot arthritis    Proposed procedure: left foot bone graft    Date of Surgery/ Procedure: 1/27/21  Hospital/Surgical Facility: [place] [facility]  Primary Physician: Emanuel Riggs  Type of Anesthesia Anticipated: to be determined  History of anesthesia complications: PONV  History of  abnormal bleeding: NONE   History of blood transfusions: NO  Patient has a Health Care Directive or Living Will:  YES     Preoperative Questions   1. No - Have you ever had a heart attack or stroke?  2. No - Have you ever had surgery on your heart or blood vessels, such as a stent, coronary (heart) bypass, or surgery on an artery in the head, neck, heart, or legs?  3. No - Do you have chest pain when you are physically active?  4. No - Do you have a history of heart failure?  5. No - Do you currently have a cold, bronchitis, or symptoms of other respiratory (head and chest) infections?  6. No - Do you have a cough, shortness of breath, or wheezing?  7. No - Do you or anyone in your family have a history of blood clots?  8. YES  - Do you or anyone in your family have a serious bleeding problem, such as long-lasting bleeding after surgeries or cuts? Brother had DVT  9. No - Have you ever had anemia or been told to take iron pills?  10. No - Have you had any abnormal blood loss such as black, tarry or bloody stools, or abnormal vaginal bleeding?  11. No - Have you ever had a blood transfusion?  12. Yes - Are you willing to have a blood transfusion if it is medically needed before, during, or after your surgery?  13. YES - Have you or anyone in your family ever had problems with anesthesia  (sedation for surgery)?  14. No - Do you have sleep apnea, excessive snoring, or daytime drowsiness? PONV  15. No - Do you have any artifical heart valves or other implanted medical devices, such as a pacemaker, defibrillator, or continuous glucose monitor?  16. No - Do you have any artifical joints?  17. No - Are you allergic to latex?  18. No - Is there any chance that you may be pregnant?    Patient Active Problem List   Diagnosis     Mixed hyperlipidemia     Ocular migraine     Herpes simplex virus (HSV) infection     Essential hypertension, benign     Health Care Home     Ganglion cyst of wrist, left     Vertigo     Asymptomatic stenosis of left carotid artery - US 9/21 recommended     Cerebral ischemia- small vessel seen on MRI 2020     Mild persistent asthma without complication     Migraine with aura and without status migrainosus, not intractable     HELLEN (obstructive sleep apnea)            acetaminophen (TYLENOL) 500 MG tablet, Take 500 mg by mouth 2 times daily as needed for mild pain       albuterol (PROAIR HFA) 108 (90 Base) MCG/ACT inhaler, Inhale 1-2 puffs into the lungs every 4 hours as needed       ASPIRIN NOT PRESCRIBED (INTENTIONAL), continuous prn for other Antiplatelet medication not prescribed intentionally due to Refusal by patient and Not indicated based on age       atorvastatin (LIPITOR) 80 MG tablet, Take 1 tablet (80 mg) by mouth daily       budesonide (PULMICORT FLEXHALER) 180 MCG/ACT inhaler, Inhale 1 puff into the lungs 2 times daily       calcium carbonate (TUMS) 500 MG chewable tablet, Take 1 chew tab by mouth daily       ibuprofen (ADVIL/MOTRIN) 200 MG tablet, Take 200 mg by mouth 2 times daily as needed for mild pain       magnesium 250 MG tablet, Take 1 tablet by mouth 2 times daily       metoprolol tartrate (LOPRESSOR) 25 MG tablet, Take 1 tablet (25 mg) by mouth 2 times daily       MULTI-VITAMIN/IRON OR TABS, 1 TABLET DAILY       omeprazole (PRILOSEC) 20 MG DR capsule, Take 1  capsule (20 mg) by mouth daily       riboflavin (VITAMIN  B-2) 100 MG TABS tablet, Take 25 mg by mouth daily       triamterene-HCTZ (MAXZIDE-25) 37.5-25 MG tablet, Take 1 tablet by mouth daily    No current facility-administered medications on file prior to visit.       OTC products: 1 ibuprofen    No Known Allergies  Latex Allergy: NO    Social History     Socioeconomic History     Marital status:      Spouse name: None     Number of children: 2     Years of education: None     Highest education level: None   Occupational History     None   Social Needs     Financial resource strain: None     Food insecurity     Worry: None     Inability: None     Transportation needs     Medical: None     Non-medical: None   Tobacco Use     Smoking status: Never Smoker     Smokeless tobacco: Never Used   Substance and Sexual Activity     Alcohol use: Yes     Alcohol/week: 0.0 standard drinks     Comment: very occ     Drug use: No     Sexual activity: Not Currently   Lifestyle     Physical activity     Days per week: None     Minutes per session: None     Stress: None   Relationships     Social connections     Talks on phone: None     Gets together: None     Attends Yazdanism service: None     Active member of club or organization: None     Attends meetings of clubs or organizations: None     Relationship status: None     Intimate partner violence     Fear of current or ex partner: None     Emotionally abused: None     Physically abused: None     Forced sexual activity: None   Other Topics Concern     Parent/sibling w/ CABG, MI or angioplasty before 65F 55M? Not Asked      Service No     Blood Transfusions No     Caffeine Concern No     Occupational Exposure No     Hobby Hazards No     Sleep Concern Not Asked     Stress Concern Not Asked     Weight Concern Yes     Special Diet Yes     Comment: low cholesterol     Back Care Not Asked     Exercise Yes     Bike Helmet Not Asked     Seat Belt Yes     Self-Exams Not Asked  "  Social History Narrative     None       REVIEW OF SYSTEMS:   Constitutional, HEENT, cardiovascular, pulmonary, gi and gu systems are negative, except as otherwise noted.    EXAM:     Patient Vitals for the past 24 hrs:   BP Temp Pulse Resp Height Weight   01/22/21 1131 132/74 97.4  F (36.3  C) 60 20 1.588 m (5' 2.5\") 78.7 kg (173 lb 9.6 oz)     Body mass index is 31.25 kg/m .  GENERAL: healthy, alert and no distress  EYES: Eyes grossly normal to inspection, extraocular movements - intact, and PERRL  HENT: ear canals- normal; TMs- normal; Nose- normal; Mouth- no ulcers, no lesions  NECK: no tenderness, no adenopathy, no asymmetry, no masses, no stiffness; thyroid- normal to palpation  RESP: lungs clear to auscultation - no rales, no rhonchi, no wheezes  CV: regular rates and rhythm, normal S1 S2, no S3 or S4 and no murmur, no click or rub -  ABDOMEN: soft, no tenderness, no  hepatosplenomegaly, no masses, normal bowel sounds  MS: extremities- no gross deformities noted, no edema  SKIN: no suspicious lesions, no rashes  NEURO: strength and tone- normal, sensory exam- grossly normal, mentation- intact, speech- normal, reflexes- symmetric  BACK: no CVA tenderness, no paralumbar tenderness  PSYCH: Alert and oriented times 3; speech- coherent , normal rate and volume; able to articulate logical thoughts  LYMPHATICS: ant. cervical- normal, post. cervical- normal    DIAGNOSTICS:      EKG: appears normal, NSR, normal axis, normal intervals, no acute ST/T changes c/w ischemia, no LVH by voltage criteria, Right Bundle Branch Block  Labs Resulted Today:   Results for orders placed or performed in visit on 01/22/21   Basic Metabolic Panel (BFP)     Status: None   Result Value Ref Range    Carbon Dioxide 30.2 20 - 32 mmol/L    Creatinine 0.99 0.60 - 1.30 mg/dL    Glucose 97 60 - 99 mg/dL    Sodium 141.7 135 - 146 mmol/L    Potassium 4.25 3.5 - 5.3 mmol/L    Chloride 103.1 98 - 110 mmol/L    Urea Nitrogen 19 7 - 25 mg/dL    " Calcium 10.2 8.6 - 10.3 mg/dL    BUN/Creatinine Ratio 19.2 6 - 22   Hemogram with Platelets (BFP)     Status: None   Result Value Ref Range    WBC 9.6 4.0 - 11 10*9/L    RBC Count 4.70 3.8 - 5.2 10*12/L    Hemoglobin 14.5 11.7 - 15.7 g/dL    Hematocrit 43.4 35.0 - 47.0 %    MCV 92.4 78 - 100 fL    MCH 30.9 26 - 33 pg    MCHC 33.4 31 - 36 g/dL    RDW 11.9 %    Platelet Count 325 150 - 375 10^9/L       RISK ASSESSMENT:     Cardiovascular Risk:  -Patient is able to do heavy housework without chest pain.  -The patient does not have chest pain with exertion.  -Patient does not have a history of congestive heart failure.    -The patient does not have a history of stroke and does not have a history of valvular disease.    Pulmonary Risk:  -In terms of risk factors for pulmonary complication, the patient is older then 60 and has asthma    Perioperative Complications:  -The patient does not have a history of bleeding or clotting problems in the past.    -The patient has not had complications from surgeries.    -The patient does not have a family history of any anesthesia or surgical complications.      IMPRESSION:   Reason for surgery/procedure: arthritis    The proposed surgical procedure is considered INTERMEDIATE risk.    For above listed surgery and anesthesia:   Patient is at low risk for surgery/procedure and perioperative/procedure complications.    RECOMMENDATIONS:       PATIENT IS MEDICALLY CLEARED FOR SURGERY.    Medications:  Patient should hold their regular medications the morning of surgery except metoprolol.       Hold aspirin 7 days prior to surgery.  Hold ibuprofen for 5 days prior.  Hold ACE inhibitors and ARB's the day of surgery.      VINICIUS Lopez    Please contact our office if there are any further questions or information required about this patient.

## 2021-01-22 NOTE — TELEPHONE ENCOUNTER
"I was about to fax the preop that was printed next to my desk. I noticed at the end it does not say \"approval given\" Could you addend it? I can reprint and fax to [facility].  "

## 2021-02-12 ENCOUNTER — TRANSFERRED RECORDS (OUTPATIENT)
Dept: FAMILY MEDICINE | Facility: CLINIC | Age: 71
End: 2021-02-12

## 2021-03-15 ENCOUNTER — TRANSFERRED RECORDS (OUTPATIENT)
Dept: FAMILY MEDICINE | Facility: CLINIC | Age: 71
End: 2021-03-15

## 2021-04-05 ENCOUNTER — TRANSFERRED RECORDS (OUTPATIENT)
Dept: FAMILY MEDICINE | Facility: CLINIC | Age: 71
End: 2021-04-05

## 2021-04-26 ENCOUNTER — TRANSFERRED RECORDS (OUTPATIENT)
Dept: FAMILY MEDICINE | Facility: CLINIC | Age: 71
End: 2021-04-26

## 2021-07-06 ENCOUNTER — TRANSFERRED RECORDS (OUTPATIENT)
Dept: FAMILY MEDICINE | Facility: CLINIC | Age: 71
End: 2021-07-06

## 2021-08-10 ENCOUNTER — TRANSFERRED RECORDS (OUTPATIENT)
Dept: FAMILY MEDICINE | Facility: CLINIC | Age: 71
End: 2021-08-10

## 2021-09-01 ENCOUNTER — APPOINTMENT (OUTPATIENT)
Dept: URBAN - METROPOLITAN AREA CLINIC 253 | Age: 71
Setting detail: DERMATOLOGY
End: 2021-09-07

## 2021-09-01 VITALS — HEIGHT: 64 IN | WEIGHT: 170 LBS | RESPIRATION RATE: 15 BRPM

## 2021-09-01 DIAGNOSIS — D17 BENIGN LIPOMATOUS NEOPLASM: ICD-10-CM

## 2021-09-01 DIAGNOSIS — L82.1 OTHER SEBORRHEIC KERATOSIS: ICD-10-CM

## 2021-09-01 DIAGNOSIS — Z71.89 OTHER SPECIFIED COUNSELING: ICD-10-CM

## 2021-09-01 DIAGNOSIS — D18.0 HEMANGIOMA: ICD-10-CM

## 2021-09-01 DIAGNOSIS — D22 MELANOCYTIC NEVI: ICD-10-CM

## 2021-09-01 DIAGNOSIS — L81.4 OTHER MELANIN HYPERPIGMENTATION: ICD-10-CM

## 2021-09-01 PROBLEM — D23.39 OTHER BENIGN NEOPLASM OF SKIN OF OTHER PARTS OF FACE: Status: ACTIVE | Noted: 2021-09-01

## 2021-09-01 PROBLEM — D22.5 MELANOCYTIC NEVI OF TRUNK: Status: ACTIVE | Noted: 2021-09-01

## 2021-09-01 PROBLEM — D48.5 NEOPLASM OF UNCERTAIN BEHAVIOR OF SKIN: Status: ACTIVE | Noted: 2021-09-01

## 2021-09-01 PROBLEM — D18.01 HEMANGIOMA OF SKIN AND SUBCUTANEOUS TISSUE: Status: ACTIVE | Noted: 2021-09-01

## 2021-09-01 PROCEDURE — OTHER ADDITIONAL NOTES: OTHER

## 2021-09-01 PROCEDURE — OTHER COUNSELING: OTHER

## 2021-09-01 PROCEDURE — OTHER PATHOLOGY BILLING: OTHER

## 2021-09-01 PROCEDURE — 11102 TANGNTL BX SKIN SINGLE LES: CPT

## 2021-09-01 PROCEDURE — 88305 TISSUE EXAM BY PATHOLOGIST: CPT

## 2021-09-01 PROCEDURE — 99203 OFFICE O/P NEW LOW 30 MIN: CPT | Mod: 25

## 2021-09-01 PROCEDURE — OTHER BIOPSY BY SHAVE METHOD: OTHER

## 2021-09-01 ASSESSMENT — LOCATION ZONE DERM
LOCATION ZONE: TRUNK
LOCATION ZONE: ARM

## 2021-09-01 ASSESSMENT — LOCATION SIMPLE DESCRIPTION DERM
LOCATION SIMPLE: RIGHT UPPER ARM
LOCATION SIMPLE: ABDOMEN
LOCATION SIMPLE: UPPER BACK

## 2021-09-01 ASSESSMENT — LOCATION DETAILED DESCRIPTION DERM
LOCATION DETAILED: LEFT RIB CAGE
LOCATION DETAILED: RIGHT ANTERIOR MEDIAL PROXIMAL UPPER ARM
LOCATION DETAILED: INFERIOR THORACIC SPINE

## 2021-09-01 NOTE — PROCEDURE: PATHOLOGY BILLING
Immunohistochemistry ([id number] and [id number]) billing is not performed here. Please use the Immunohistochemistry Stain Billing plan to accomplish this. Immunohistochemistry ([id number] and [id number]) billing is not performed here. Please use the Immunohistochemistry Stain Billing plan to accomplish this.

## 2021-09-01 NOTE — HPI: FULL BODY SKIN EXAMINATION
How Severe Are Your Spot(S)?: mild
What Type Of Note Output Would You Prefer (Optional)?: Standard Output
What Is The Reason For Today's Visit?: Full Body Skin Examination with No Concerns
What Is The Reason For Today's Visit? (Being Monitored For X): concerning skin lesions on an annual basis
Additional History: She rides and bike and goes for walks daily. Wears a daily sunscreen on her face.  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

## 2021-09-01 NOTE — PROCEDURE: BIOPSY BY SHAVE METHOD
[address]  [address]  [phone]               Thank you for choosing us for your health care visit with Steve Adams DPM.  We are glad to serve you and happy to provide yo
Follow Up Visit with Jillian Bunn MD   [facility] Dermatology [facility])    [address] [address] [phone]              Allergies as of Feb 10, 2017     No Known Allergies
Your unique GetMyRx Access Code: [email or web address]  Expires: 3/12/2017  8:51 PM    If you have questions, you can call [phone] to talk to our [facility] Staff. Remember, GetMyRx is NOT to be used for urgent needs. For medical emergencies, dial 911.
Billing Type: Client Bill
Hide Topical Anesthesia?: No
Cryotherapy Text: The wound bed was treated with cryotherapy after the biopsy was performed.
Notification Instructions: Patient will be notified of biopsy results. However, patient instructed to call the office if not contacted within 2 weeks.
Detail Level: Detailed
Silver Nitrate Text: The wound bed was treated with silver nitrate after the biopsy was performed.
Biopsy Method: Dermablade
Type Of Destruction Used: Curettage
Curettage Text: The wound bed was treated with curettage after the biopsy was performed.
Anesthesia Volume In Cc (Will Not Render If 0): 0.3
Consent: Written consent was obtained and risks were reviewed including but not limited to scarring, infection, bleeding, scabbing, incomplete removal, nerve damage and allergy to anesthesia.
Depth Of Biopsy: dermis
Dressing: bandage
Hemostasis: Drysol
X Size Of Lesion In Cm: 0
Post-Care Instructions: I reviewed with the patient in detail post-care instructions. Patient is to keep the biopsy site dry overnight, and then apply bacitracin twice daily until healed. Patient may apply hydrogen peroxide soaks to remove any crusting.
Electrodesiccation Text: The wound bed was treated with electrodesiccation after the biopsy was performed.
Information: Selecting Yes will display possible errors in your note based on the variables you have selected. This validation is only offered as a suggestion for you. PLEASE NOTE THAT THE VALIDATION TEXT WILL BE REMOVED WHEN YOU FINALIZE YOUR NOTE. IF YOU WANT TO FAX A PRELIMINARY NOTE YOU WILL NEED TO TOGGLE THIS TO 'NO' IF YOU DO NOT WANT IT IN YOUR FAXED NOTE.
Was A Bandage Applied: Yes
Biopsy Type: H and E
Wound Care: Petrolatum
Anesthesia Type: 2% lidocaine with epinephrine and a 1:10 solution of 8.4% sodium bicarbonate
Electrodesiccation And Curettage Text: The wound bed was treated with electrodesiccation and curettage after the biopsy was performed.

## 2021-09-01 NOTE — PROCEDURE: ADDITIONAL NOTES
Additional Notes: Discussed excision as desired.
Detail Level: Zone
Render Risk Assessment In Note?: no

## 2021-09-04 ENCOUNTER — HEALTH MAINTENANCE LETTER (OUTPATIENT)
Age: 71
End: 2021-09-04

## 2021-09-07 DIAGNOSIS — I65.22 ASYMPTOMATIC STENOSIS OF LEFT CAROTID ARTERY: ICD-10-CM

## 2021-09-09 RX ORDER — ATORVASTATIN CALCIUM 80 MG/1
TABLET, FILM COATED ORAL
Qty: 90 TABLET | Refills: 0 | Status: SHIPPED | OUTPATIENT
Start: 2021-09-09 | End: 2021-09-14

## 2021-09-09 NOTE — TELEPHONE ENCOUNTER
12/3/2020, return in 1 year, was not filled at that time     Pending Prescriptions:                       Disp   Refills    atorvastatin (LIPITOR) 80 MG tablet [Phar*90 tab*             Sig: TAKE ONE TABLET BY MOUTH ONE TIME DAILY        REPORT NAME: Progress Notes Report  VISIT DATE: 5/30/2017  VISIT TIME: 3:32 PM EDT  PATIENT NAME: Joaquin Duke  MEDICAL RECORD NUMBER: [id number]  YOB: 1938  AGE: 69  REFERRING PHYSICIAN: Jorge Chau  SUPERVISING CLINICIAN: Jalyn Arnett CARE PROVIDER: [facility]  PATIENT HOME ADDRESS: [address] [place] [address], Formerly named [facility] [address] [facility]  PATIENT HOME PHONE: [phone]  SOCIAL SECURITY NUMBER:   DIAGNOSIS 1: Unspecified atrial flutter / [id number]  DIAGNOSIS 2:   INR RANGE: 2 - 3  INR GOAL: 2 5  TREATMENT START DATE:   TREATMENT END DATE:   NEXT VISIT: 6/13/2017  [facility]  VISIT RESULTS  ENCOUNTER NUMBER:   TEST LOCATION:   TEST TYPE:   VISIT TYPE:   CURRENT INR: 1 77 PROTIME:   SPECIMEN COL AND RPT DATE: 5/30/2017 3:32 PM  EDT  VITAL SIGNS  PULSE:  BP: / WEIGHT:  HEIGHT:  TEMP:   CURRENT ANTICOAGULANT DOSING SCHEDULE  DOSE SIZE: 2mg    ANTICOAGULANT TYPE: WARFARIN SOD  DOSING REGIMEN  Sun       Mon       Tues      Wed       Thurs     Fri       Sat  Total/Wk  2         4         4         4         2         4         4         24  PATIENT MEDICATION INSTRUCTION: Yes  PATIENT NUTRITIONAL COUNSELING: No  PATIENT BRUISING INSTRUCTION: No  LAST EDUCATION DATE:   PREVIOUS VISIT INFORMATION  VISITDATE  INRGoal INR   Sun    Mon    Tues   Wed    Thurs  Fri    Sat  Total/wk  5/30/2017   2 5     1 77  2      4      4      4      2      4      4  24  5/9/2017    2 5     2 7   2      4      2      4      2      4      4  22  4/18/2017   2 5     2 59  2      4      2      4      2      4      4  22  4/4/2017    2 5     1 85  2      4      2      4      2      4      4  22  ADDITIONAL PREVIOUS VISIT INFORMATION  VISITDATE   PRIMARY RX               DOSE      CrCl  5/30/2017   WARFARIN SOD             2mg  5/9/2017    WARFARIN SOD             2mg  4/18/2017   WARFARIN SOD             2mg  4/4/2017    WARFARIN SOD             2mg  OTHER CURRENT MEDICATIONS:  WARFARIN SOD, WARFARIN SOD  PROGRESS NOTES: gave dosage to pt  retest inr in 2 wks  pt has bleeding  hemorrhoids  pt is seeing GI dr for this    PATIENT INSTRUCTIONS:   TEST LOCATION: , , ,   INBOUND LAB DATA:  Lab       Lab Value Col Date                 Rpt Date                 Lab  Reference Range  Electronically signed byYenni Dixon on 5/30/2017 3:32 PM EDT

## 2021-09-13 ENCOUNTER — APPOINTMENT (OUTPATIENT)
Dept: URBAN - METROPOLITAN AREA CLINIC 253 | Age: 71
Setting detail: DERMATOLOGY
End: 2021-09-15

## 2021-09-13 VITALS — RESPIRATION RATE: 14 BRPM | HEIGHT: 64 IN | WEIGHT: 173 LBS

## 2021-09-13 DIAGNOSIS — D485 NEOPLASM OF UNCERTAIN BEHAVIOR OF SKIN: ICD-10-CM

## 2021-09-13 PROBLEM — D48.5 NEOPLASM OF UNCERTAIN BEHAVIOR OF SKIN: Status: ACTIVE | Noted: 2021-09-13

## 2021-09-13 PROCEDURE — 88305 TISSUE EXAM BY PATHOLOGIST: CPT

## 2021-09-13 PROCEDURE — 12032 INTMD RPR S/A/T/EXT 2.6-7.5: CPT

## 2021-09-13 PROCEDURE — 11402 EXC TR-EXT B9+MARG 1.1-2 CM: CPT

## 2021-09-13 PROCEDURE — OTHER EXCISION: OTHER

## 2021-09-13 PROCEDURE — OTHER PATHOLOGY BILLING: OTHER

## 2021-09-13 ASSESSMENT — LOCATION SIMPLE DESCRIPTION DERM: LOCATION SIMPLE: ABDOMEN

## 2021-09-13 ASSESSMENT — LOCATION ZONE DERM: LOCATION ZONE: TRUNK

## 2021-09-13 ASSESSMENT — LOCATION DETAILED DESCRIPTION DERM: LOCATION DETAILED: LEFT RIB CAGE

## 2021-09-13 NOTE — PROCEDURE: PATHOLOGY BILLING
Immunohistochemistry ([id number] and [id number]) billing is not performed here. Please use the Immunohistochemistry Stain Billing plan to accomplish this. Immunohistochemistry ([id number] and [id number]) billing is not performed here. Please use the Immunohistochemistry Stain Billing plan to accomplish this.

## 2021-09-13 NOTE — PROCEDURE: EXCISION
Health Maintenance Due   Topic Date Due   • Influenza Vaccine (1) 09/01/2018   • Depression Screening  02/23/2019       Patient is due for the topics as listed above and wishes to proceed with them. Orders placed for flu vaccine.PHQ2 done today.            O-Z Flap Text: The defect edges were debeveled with a #15 scalpel blade.  Given the location of the defect, shape of the defect and the proximity to free margins an O-Z flap was deemed most appropriate.  Using a sterile surgical marker, an appropriate transposition flap was drawn incorporating the defect and placing the expected incisions within the relaxed skin tension lines where possible. The area thus outlined was incised deep to adipose tissue with a #15 scalpel blade.  The skin margins were undermined to an appropriate distance in all directions utilizing iris scissors.

## 2021-09-14 ENCOUNTER — TELEPHONE (OUTPATIENT)
Dept: FAMILY MEDICINE | Facility: CLINIC | Age: 71
End: 2021-09-14

## 2021-09-14 ENCOUNTER — OFFICE VISIT (OUTPATIENT)
Dept: FAMILY MEDICINE | Facility: CLINIC | Age: 71
End: 2021-09-14

## 2021-09-14 VITALS
TEMPERATURE: 97.9 F | OXYGEN SATURATION: 98 % | BODY MASS INDEX: 31.61 KG/M2 | WEIGHT: 175.6 LBS | SYSTOLIC BLOOD PRESSURE: 130 MMHG | DIASTOLIC BLOOD PRESSURE: 70 MMHG | HEART RATE: 64 BPM

## 2021-09-14 DIAGNOSIS — E78.2 MIXED HYPERLIPIDEMIA: ICD-10-CM

## 2021-09-14 DIAGNOSIS — I65.22 ASYMPTOMATIC STENOSIS OF LEFT CAROTID ARTERY: Primary | ICD-10-CM

## 2021-09-14 DIAGNOSIS — I10 ESSENTIAL HYPERTENSION, BENIGN: ICD-10-CM

## 2021-09-14 DIAGNOSIS — M85.852 OSTEOPENIA OF NECK OF LEFT FEMUR: ICD-10-CM

## 2021-09-14 DIAGNOSIS — G43.109 OCULAR MIGRAINE: ICD-10-CM

## 2021-09-14 DIAGNOSIS — G43.109 MIGRAINE WITH AURA AND WITHOUT STATUS MIGRAINOSUS, NOT INTRACTABLE: ICD-10-CM

## 2021-09-14 DIAGNOSIS — I67.82 CEREBRAL ISCHEMIA: ICD-10-CM

## 2021-09-14 DIAGNOSIS — J45.30 MILD PERSISTENT ASTHMA WITHOUT COMPLICATION: ICD-10-CM

## 2021-09-14 DIAGNOSIS — G47.33 OSA (OBSTRUCTIVE SLEEP APNEA): ICD-10-CM

## 2021-09-14 DIAGNOSIS — R73.01 ELEVATED FASTING GLUCOSE: ICD-10-CM

## 2021-09-14 DIAGNOSIS — K21.9 GASTROESOPHAGEAL REFLUX DISEASE, UNSPECIFIED WHETHER ESOPHAGITIS PRESENT: ICD-10-CM

## 2021-09-14 DIAGNOSIS — B00.9 HERPES SIMPLEX VIRUS (HSV) INFECTION: ICD-10-CM

## 2021-09-14 LAB
ALBUMIN SERPL-MCNC: 4.6 G/DL (ref 3.6–5.1)
ALBUMIN/GLOB SERPL: 1.8 {RATIO} (ref 1–2.5)
ALP SERPL-CCNC: 115 U/L (ref 33–130)
ALT 1742-6: 22 U/L (ref 0–32)
AST 1920-8: 24 U/L (ref 0–35)
BILIRUB SERPL-MCNC: 0.7 MG/DL (ref 0.2–1.2)
BUN SERPL-MCNC: 28 MG/DL (ref 7–25)
BUN/CREATININE RATIO: 25.9 (ref 6–22)
CALCIUM SERPL-MCNC: 10.1 MG/DL (ref 8.6–10.3)
CHLORIDE SERPLBLD-SCNC: 104.2 MMOL/L (ref 98–110)
CHOLEST SERPL-MCNC: 253 MG/DL (ref 0–199)
CHOLEST/HDLC SERPL: 3 {RATIO} (ref 0–5)
CO2 SERPL-SCNC: 26.4 MMOL/L (ref 20–32)
CREAT SERPL-MCNC: 1.08 MG/DL (ref 0.6–1.3)
GLOBULIN, CALCULATED - QUEST: 2.5 (ref 1.9–3.7)
GLUCOSE SERPL-MCNC: 110 MG/DL (ref 60–99)
HBA1C MFR BLD: 5.8 % (ref 4–7)
HDLC SERPL-MCNC: 83 MG/DL (ref 40–150)
LDLC SERPL CALC-MCNC: 124 MG/DL (ref 0–130)
POTASSIUM SERPL-SCNC: 3.74 MMOL/L (ref 3.5–5.3)
PROT SERPL-MCNC: 7.1 G/DL (ref 6.1–8.1)
SODIUM SERPL-SCNC: 141.5 MMOL/L (ref 135–146)
TRIGL SERPL-MCNC: 230 MG/DL (ref 0–149)

## 2021-09-14 PROCEDURE — 80053 COMPREHEN METABOLIC PANEL: CPT | Performed by: PHYSICIAN ASSISTANT

## 2021-09-14 PROCEDURE — 80061 LIPID PANEL: CPT | Performed by: PHYSICIAN ASSISTANT

## 2021-09-14 PROCEDURE — 99214 OFFICE O/P EST MOD 30 MIN: CPT | Performed by: PHYSICIAN ASSISTANT

## 2021-09-14 PROCEDURE — 36415 COLL VENOUS BLD VENIPUNCTURE: CPT | Performed by: PHYSICIAN ASSISTANT

## 2021-09-14 PROCEDURE — 83036 HEMOGLOBIN GLYCOSYLATED A1C: CPT | Performed by: PHYSICIAN ASSISTANT

## 2021-09-14 RX ORDER — ALBUTEROL SULFATE 90 UG/1
2 AEROSOL, METERED RESPIRATORY (INHALATION) EVERY 6 HOURS
Qty: 18 G | Refills: 1 | Status: SHIPPED | OUTPATIENT
Start: 2021-09-14 | End: 2023-11-17

## 2021-09-14 RX ORDER — ATORVASTATIN CALCIUM 80 MG/1
80 TABLET, FILM COATED ORAL DAILY
Qty: 90 TABLET | Refills: 3 | Status: SHIPPED | OUTPATIENT
Start: 2021-09-14 | End: 2022-07-08

## 2021-09-14 RX ORDER — METOPROLOL TARTRATE 25 MG/1
25 TABLET, FILM COATED ORAL 2 TIMES DAILY
Qty: 180 TABLET | Refills: 3 | Status: SHIPPED | OUTPATIENT
Start: 2021-09-14 | End: 2022-07-08

## 2021-09-14 RX ORDER — BUDESONIDE 180 UG/1
1 AEROSOL, POWDER RESPIRATORY (INHALATION) 2 TIMES DAILY
Qty: 3 EACH | Refills: 3 | Status: SHIPPED | OUTPATIENT
Start: 2021-09-14 | End: 2022-07-08

## 2021-09-14 RX ORDER — TRIAMTERENE/HYDROCHLOROTHIAZID 37.5-25 MG
1 TABLET ORAL DAILY
Qty: 90 TABLET | Refills: 1 | Status: SHIPPED | OUTPATIENT
Start: 2021-09-14 | End: 2022-07-08

## 2021-09-14 NOTE — PROGRESS NOTES
Assessment & Plan     Asymptomatic stenosis of left carotid artery - US 9/21 recommended  Restart 81 mg.     - VENOUS COLLECTION  - Lipid Panel ([facility])  - Comprehensive Metobolic Panel ([facility])  - Radiology Referral  - US Carotid Bilateral  - atorvastatin (LIPITOR) 80 MG tablet  Dispense: 90 tablet; Refill: 3    Cerebral ischemia- small vessel seen on MRI 2020    - VENOUS COLLECTION  - Lipid Panel ([facility])  - Comprehensive Metobolic Panel ([facility])  - aspirin (ASA) 81 MG EC tablet    Essential hypertension, benign    - VENOUS COLLECTION  - Comprehensive Metobolic Panel ([facility])  - metoprolol tartrate (LOPRESSOR) 25 MG tablet  Dispense: 180 tablet; Refill: 3  - triamterene-HCTZ (MAXZIDE-25) 37.5-25 MG tablet  Dispense: 90 tablet; Refill: 1    Herpes simplex virus (HSV) infection      Migraine with aura and without status migrainosus, not intractable      Mild persistent asthma without complication  Start using albuterol prior to physical activity - call me with update if improving will switch to Advair  If not improvement see me 3-4 eeks  - budesonide (PULMICORT FLEXHALER) 180 MCG/ACT inhaler  Dispense: 3 each; Refill: 3  - albuterol (PROAIR HFA/PROVENTIL HFA/VENTOLIN HFA) 108 (90 Base) MCG/ACT inhaler  Dispense: 18 g; Refill: 1    Mixed hyperlipidemia    - VENOUS COLLECTION  - Lipid Panel (BFP)  - Comprehensive Metobolic Panel (BFP)    Ocular migraine      HELLEN (obstructive sleep apnea)      Osteopenia of neck of left femur - repeat 2023  Repeat DEXA next year    Gastroesophageal reflux disease, unspecified whether esophagitis present    - omeprazole (PRILOSEC) 20 MG DR capsule  Dispense: 90 capsule; Refill: 3        FUTURE APPOINTMENTS:       - Follow-up visit in 6 months  Work on weight loss  Regular exercise      VINICIUS Lopez  [facility]    Subjective     Nursing Notes:   Amaya Duncan CMA  9/14/2021  8:32 AM  Signed  Chief Complaint   Patient presents with     Recheck Medication      fasting   Went to derm, removal on left side, waiting for pathology report       Pre-Visit Screening:  Immunizations:informed   Colonoscopy:Aug.   Mammogram:Na  Asthma Action Test/Plan:today  PHQ9:NA  GAD7:Na  Questioned patient about current smoking habits Pt.never  OK to leave a detailed message on voice mail for today's visit yes, phone # [phone]           Priscilla Linda is a 71 year old female who presents to clinic today for the following health issues     HPI     GERD  - omeprazole daily - taking MV    Cold sores  - several a year    HELLEN - mild CPAP  Not sleeping well with it.   Tried dental appliance - wore for a few months  Jaw pain with that.   Appt in 6 weeks.     Hyperlipidemia Follow-Up      Are you regularly taking any medication or supplement to lower your cholesterol?   Yes- 80mg  Lipitor    Are you having muscle aches or other side effects that you think could be caused by your cholesterol lowering medication?  No    Hypertension Follow-up      Do you check your blood pressure regularly outside of the clinic? Yes       Vascular Disease Follow-up        Do you take an aspirin every day? No    Asthma Follow-Up    Was ACT completed today?    Yes    ACT Total Scores 9/14/2021   ACT TOTAL SCORE -   ASTHMA ER VISITS -   ASTHMA HOSPITALIZATIONS -   ACT TOTAL SCORE (Goal Greater than or Equal to 20) 21   In the past 12 months, how many times did you visit the emergency room for your asthma without being admitted to the hospital? 0   In the past 12 months, how many times were you hospitalized overnight because of your asthma? 0          How many days per week do you miss taking your asthma controller medication?  0    Please describe any recent triggers for your asthma: None    Have you had any Emergency Room Visits, Urgent Care Visits, or Hospital Admissions since your last office visit?  No      Feels like breathing worse in the last summer   Less active after surgeries  Weight up  Will feel like  breathing with pursed lips at the gym  Not using albuterol      Went to the dermatologist a few weeks ago  Spot on left side - removed - and bx possible Malignant melanoma  Complete excision done yesterday and result pending          Current Medications  Current Outpatient Medications   Medication Sig Dispense Refill     acetaminophen (TYLENOL) 500 MG tablet Take 500 mg by mouth 2 times daily as needed for mild pain       albuterol (PROAIR HFA) 108 (90 Base) MCG/ACT inhaler Inhale 1-2 puffs into the lungs every 4 hours as needed 1 Inhaler 1     albuterol (PROAIR HFA/PROVENTIL HFA/VENTOLIN HFA) 108 (90 Base) MCG/ACT inhaler Inhale 2 puffs into the lungs every 6 hours 18 g 1     aspirin (ASA) 81 MG EC tablet Take 1 tablet (81 mg) by mouth daily       atorvastatin (LIPITOR) 80 MG tablet Take 1 tablet (80 mg) by mouth daily 90 tablet 3     budesonide (PULMICORT FLEXHALER) 180 MCG/ACT inhaler Inhale 1 puff into the lungs 2 times daily 3 each 3     calcium carbonate (TUMS) 500 MG chewable tablet Take 1 chew tab by mouth daily       ibuprofen (ADVIL/MOTRIN) 200 MG tablet Take 200 mg by mouth 2 times daily as needed for mild pain       magnesium 250 MG tablet Take 1 tablet by mouth 2 times daily       metoprolol tartrate (LOPRESSOR) 25 MG tablet Take 1 tablet (25 mg) by mouth 2 times daily 180 tablet 3     MULTI-VITAMIN/IRON OR TABS 1 TABLET DAILY       omeprazole (PRILOSEC) 20 MG DR capsule Take 1 capsule (20 mg) by mouth daily 90 capsule 3     riboflavin (VITAMIN  B-2) 100 MG TABS tablet Take 25 mg by mouth daily       triamterene-HCTZ (MAXZIDE-25) 37.5-25 MG tablet Take 1 tablet by mouth daily 90 tablet 1         Constitutional, HEENT, Cardiovascular, Pulmonary, GI and  systems are negative, except as otherwise noted.          Objective    /70 (BP Location: Left arm, Patient Position: Sitting, Cuff Size: Adult Large)   Pulse 64   Temp 97.9  F (36.6  C)   Wt 79.7 kg (175 lb 9.6 oz)   LMP  (LMP Unknown)   SpO2  98%   BMI 31.61 kg/m    Body mass index is 31.61 kg/m .  Physical Exam     GENERAL: healthy, alert and no distress  EYES: Eyes grossly normal to inspection, PERRL and conjunctivae and sclerae normal  HENT: ear canals and TM's normal, nose and mouth without ulcers or lesions  NECK: no adenopathy, no asymmetry, masses, or scars and thyroid normal to palpation  RESP: lungs clear to auscultation - no rales, rhonchi or wheezes  CV: regular rate and rhythm, normal S1 S2, no S3 or S4, no murmur, click or rub, no peripheral edema and peripheral pulses strong  ABDOMEN: soft, nontender, no hepatosplenomegaly, no masses and bowel sounds normal  MS: no gross musculoskeletal defects noted, no edema

## 2021-09-14 NOTE — NURSING NOTE
Chief Complaint   Patient presents with     Recheck Medication     fasting   Went to derm, removal on left side, waiting for pathology report       Pre-Visit Screening:  Immunizations:informed   Colonoscopy:Aug.   Mammogram:Na  Asthma Action Test/Plan:today  PHQ9:NA  GAD7:Na  Questioned patient about current smoking habits Pt.never  OK to leave a detailed message on voice mail for today's visit yes, phone # [phone]

## 2021-09-14 NOTE — TELEPHONE ENCOUNTER
Medical records request to [facility]. Request faxed/scan/ 9/14/21. Waiting on records

## 2021-09-15 ASSESSMENT — ASTHMA QUESTIONNAIRES: ACT_TOTALSCORE: 21

## 2021-09-17 ENCOUNTER — TRANSFERRED RECORDS (OUTPATIENT)
Dept: FAMILY MEDICINE | Facility: CLINIC | Age: 71
End: 2021-09-17

## 2021-09-24 ENCOUNTER — APPOINTMENT (OUTPATIENT)
Dept: URBAN - METROPOLITAN AREA CLINIC 256 | Age: 71
Setting detail: DERMATOLOGY
End: 2021-09-24

## 2021-09-24 VITALS — WEIGHT: 173 LBS | HEIGHT: 64 IN

## 2021-09-24 VITALS — HEIGHT: 64 IN | RESPIRATION RATE: 14 BRPM | WEIGHT: 173 LBS

## 2021-09-24 DIAGNOSIS — Z48.817 ENCOUNTER FOR SURGICAL AFTERCARE FOLLOWING SURGERY ON THE SKIN AND SUBCUTANEOUS TISSUE: ICD-10-CM

## 2021-09-24 PROCEDURE — OTHER COUNSELING: OTHER

## 2021-09-24 PROCEDURE — OTHER PRESCRIPTION: OTHER

## 2021-09-24 PROCEDURE — OTHER ORDER TESTS: OTHER

## 2021-09-24 PROCEDURE — 99212 OFFICE O/P EST SF 10 MIN: CPT

## 2021-09-24 RX ORDER — DOXYCYCLINE 100 MG/1
100MG CAPSULE ORAL BID
Qty: 14 | Refills: 0 | Status: ERX

## 2021-09-27 ENCOUNTER — APPOINTMENT (OUTPATIENT)
Dept: URBAN - METROPOLITAN AREA CLINIC 253 | Age: 71
Setting detail: DERMATOLOGY
End: 2021-09-29

## 2021-09-27 DIAGNOSIS — T814XXA OTHER POSTOPERATIVE INFECTION: ICD-10-CM

## 2021-09-27 DIAGNOSIS — K6811 OTHER POSTOPERATIVE INFECTION: ICD-10-CM

## 2021-09-27 PROBLEM — T81.40XA INFECTION FOLLOWING A PROCEDURE, UNSPECIFIED, INITIAL ENCOUNTER: Status: ACTIVE | Noted: 2021-09-27

## 2021-09-27 PROCEDURE — 99213 OFFICE O/P EST LOW 20 MIN: CPT

## 2021-09-27 PROCEDURE — OTHER PRESCRIPTION: OTHER

## 2021-09-27 PROCEDURE — OTHER COUNSELING: OTHER

## 2021-09-27 RX ORDER — MUPIROCIN 20 MG/G
2% OINTMENT TOPICAL QD
Qty: 22 | Refills: 0 | Status: ERX

## 2021-09-27 ASSESSMENT — LOCATION DETAILED DESCRIPTION DERM: LOCATION DETAILED: LEFT RIB CAGE

## 2021-09-27 ASSESSMENT — LOCATION ZONE DERM: LOCATION ZONE: TRUNK

## 2021-09-27 ASSESSMENT — LOCATION SIMPLE DESCRIPTION DERM: LOCATION SIMPLE: ABDOMEN

## 2021-09-27 NOTE — PROCEDURE: COUNSELING
Patient Specific Counseling (Will Not Stick From Patient To Patient): She was seen by provider EDILSON and was given Doxycyline for 10 days. Recommended that she finish it out.\\nRecommended a topical antibiotic with bandage changes.
Detail Level: Detailed

## 2021-10-04 ENCOUNTER — APPOINTMENT (OUTPATIENT)
Dept: URBAN - METROPOLITAN AREA CLINIC 256 | Age: 71
Setting detail: DERMATOLOGY
End: 2021-10-04

## 2021-10-04 DIAGNOSIS — K6811 OTHER POSTOPERATIVE INFECTION: ICD-10-CM

## 2021-10-04 DIAGNOSIS — T814XXA OTHER POSTOPERATIVE INFECTION: ICD-10-CM

## 2021-10-04 PROBLEM — T81.40XA INFECTION FOLLOWING A PROCEDURE, UNSPECIFIED, INITIAL ENCOUNTER: Status: ACTIVE | Noted: 2021-10-04

## 2021-10-04 PROCEDURE — OTHER COUNSELING: OTHER

## 2021-10-04 PROCEDURE — OTHER ADDITIONAL NOTES: OTHER

## 2021-10-04 ASSESSMENT — LOCATION ZONE DERM: LOCATION ZONE: TRUNK

## 2021-10-04 ASSESSMENT — LOCATION SIMPLE DESCRIPTION DERM: LOCATION SIMPLE: ABDOMEN

## 2021-10-04 ASSESSMENT — LOCATION DETAILED DESCRIPTION DERM: LOCATION DETAILED: LEFT RIB CAGE

## 2021-10-04 NOTE — PROCEDURE: ADDITIONAL NOTES
Render Risk Assessment In Note?: no
Additional Notes: Appears improved today, patient agrees. Soaked with hydrogen peroxide. Dressed with paper tape and telfa. Continue with topical mupirocin. Follow up as needed for wound check. FBE in 6 months.
Detail Level: Zone

## 2021-10-04 NOTE — PROCEDURE: COUNSELING
Detail Level: Detailed
Patient Specific Counseling (Will Not Stick From Patient To Patient): She was seen by provider EDILSON and was given Doxycyline for 10 days. Recommended that she finish it out.\\nRecommended a topical antibiotic with bandage changes.

## 2021-10-11 ENCOUNTER — APPOINTMENT (OUTPATIENT)
Dept: URBAN - METROPOLITAN AREA CLINIC 253 | Age: 71
Setting detail: DERMATOLOGY
End: 2021-10-11

## 2021-10-11 DIAGNOSIS — T814XXA OTHER POSTOPERATIVE INFECTION: ICD-10-CM

## 2021-10-11 DIAGNOSIS — K6811 OTHER POSTOPERATIVE INFECTION: ICD-10-CM

## 2021-10-11 PROBLEM — T81.40XA INFECTION FOLLOWING A PROCEDURE, UNSPECIFIED, INITIAL ENCOUNTER: Status: ACTIVE | Noted: 2021-10-11

## 2021-10-11 PROCEDURE — OTHER COUNSELING: OTHER

## 2021-10-11 PROCEDURE — OTHER ADDITIONAL NOTES: OTHER

## 2021-10-11 ASSESSMENT — LOCATION DETAILED DESCRIPTION DERM: LOCATION DETAILED: LEFT RIB CAGE

## 2021-10-11 ASSESSMENT — LOCATION ZONE DERM: LOCATION ZONE: TRUNK

## 2021-10-11 ASSESSMENT — LOCATION SIMPLE DESCRIPTION DERM: LOCATION SIMPLE: ABDOMEN

## 2021-10-11 NOTE — PROCEDURE: ADDITIONAL NOTES
Additional Notes: Appears improved today. Soaked with hydrogen peroxide. Dressed with paper tape and telfa. Continue with topical mupirocin. Follow up in 1 week.
Render Risk Assessment In Note?: no
Detail Level: Zone

## 2021-10-12 ENCOUNTER — MYC MEDICAL ADVICE (OUTPATIENT)
Dept: FAMILY MEDICINE | Facility: CLINIC | Age: 71
End: 2021-10-12

## 2021-10-12 DIAGNOSIS — J45.30 MILD PERSISTENT ASTHMA WITHOUT COMPLICATION: Primary | ICD-10-CM

## 2021-10-12 RX ORDER — FLUTICASONE PROPIONATE 110 UG/1
1 AEROSOL, METERED RESPIRATORY (INHALATION) 2 TIMES DAILY
Qty: 12 G | Refills: 4 | Status: SHIPPED | OUTPATIENT
Start: 2021-10-12 | End: 2021-10-12 | Stop reason: ALTCHOICE

## 2021-10-12 RX ORDER — MONTELUKAST SODIUM 10 MG/1
10 TABLET ORAL AT BEDTIME
Qty: 30 TABLET | Refills: 3 | Status: SHIPPED | OUTPATIENT
Start: 2021-10-12 | End: 2021-11-02

## 2021-10-12 NOTE — TELEPHONE ENCOUNTER
Start Singulair  30 days    I reviewed the patient information handout from Up To Date on this medication including side effects with the patient.    OK to use albuterol prn     Send update 2 weeks    Will switch to Symbicort if not improving with Singulair     Glenys Antunez PA-C  10/12/2021

## 2021-10-18 ENCOUNTER — APPOINTMENT (OUTPATIENT)
Dept: URBAN - METROPOLITAN AREA CLINIC 253 | Age: 71
Setting detail: DERMATOLOGY
End: 2021-10-18

## 2021-10-18 DIAGNOSIS — T814XXA OTHER POSTOPERATIVE INFECTION: ICD-10-CM

## 2021-10-18 DIAGNOSIS — K6811 OTHER POSTOPERATIVE INFECTION: ICD-10-CM

## 2021-10-18 PROBLEM — T81.40XA INFECTION FOLLOWING A PROCEDURE, UNSPECIFIED, INITIAL ENCOUNTER: Status: ACTIVE | Noted: 2021-10-18

## 2021-10-18 PROCEDURE — OTHER COUNSELING: OTHER

## 2021-10-18 PROCEDURE — OTHER ADDITIONAL NOTES: OTHER

## 2021-10-18 ASSESSMENT — LOCATION ZONE DERM: LOCATION ZONE: TRUNK

## 2021-10-18 ASSESSMENT — LOCATION DETAILED DESCRIPTION DERM: LOCATION DETAILED: LEFT RIB CAGE

## 2021-10-18 ASSESSMENT — LOCATION SIMPLE DESCRIPTION DERM: LOCATION SIMPLE: ABDOMEN

## 2021-10-18 NOTE — PROCEDURE: ADDITIONAL NOTES
Detail Level: Zone
Render Risk Assessment In Note?: no
Additional Notes: Appears improved today. Soaked with hydrogen peroxide. Dressed with Aquaphor, paper tape and telfa. Progress photos reviewed with provider AR.

## 2021-11-01 ENCOUNTER — MYC MEDICAL ADVICE (OUTPATIENT)
Dept: FAMILY MEDICINE | Facility: CLINIC | Age: 71
End: 2021-11-01

## 2021-11-01 DIAGNOSIS — J45.30 MILD PERSISTENT ASTHMA WITHOUT COMPLICATION: ICD-10-CM

## 2021-11-02 RX ORDER — MONTELUKAST SODIUM 10 MG/1
10 TABLET ORAL AT BEDTIME
Qty: 90 TABLET | Refills: 3 | Status: SHIPPED | OUTPATIENT
Start: 2021-11-02 | End: 2022-07-08

## 2021-11-22 ENCOUNTER — MYC MEDICAL ADVICE (OUTPATIENT)
Dept: FAMILY MEDICINE | Facility: CLINIC | Age: 71
End: 2021-11-22

## 2021-11-22 ENCOUNTER — OFFICE VISIT (OUTPATIENT)
Dept: FAMILY MEDICINE | Facility: CLINIC | Age: 71
End: 2021-11-22

## 2021-11-22 VITALS
SYSTOLIC BLOOD PRESSURE: 130 MMHG | HEIGHT: 63 IN | DIASTOLIC BLOOD PRESSURE: 72 MMHG | OXYGEN SATURATION: 94 % | WEIGHT: 175 LBS | HEART RATE: 75 BPM | TEMPERATURE: 98.1 F | BODY MASS INDEX: 31.01 KG/M2

## 2021-11-22 DIAGNOSIS — R05.9 COUGH: Primary | ICD-10-CM

## 2021-11-22 LAB — COVID-19: NEGATIVE

## 2021-11-22 PROCEDURE — 87635 SARS-COV-2 COVID-19 AMP PRB: CPT | Performed by: PHYSICIAN ASSISTANT

## 2021-11-22 PROCEDURE — G2023 SPECIMEN COLLECT COVID-19: HCPCS | Performed by: PHYSICIAN ASSISTANT

## 2021-11-22 PROCEDURE — 99213 OFFICE O/P EST LOW 20 MIN: CPT | Performed by: PHYSICIAN ASSISTANT

## 2021-11-22 RX ORDER — AZITHROMYCIN 250 MG/1
TABLET, FILM COATED ORAL
Qty: 6 TABLET | Refills: 0 | Status: SHIPPED | OUTPATIENT
Start: 2021-11-22 | End: 2021-11-27

## 2021-11-22 RX ORDER — PREDNISONE 20 MG/1
40 TABLET ORAL DAILY
Qty: 10 TABLET | Refills: 0 | Status: SHIPPED | OUTPATIENT
Start: 2021-11-22 | End: 2022-01-05

## 2021-11-22 ASSESSMENT — MIFFLIN-ST. JEOR: SCORE: 1274.98

## 2021-11-22 NOTE — PROGRESS NOTES
Assessment & Plan     Cough  Start prednisone and Zpack  RTC if not improving as expected  UC this weekend if we are closed and not improving  - COVID-19 ([facility])  - MD SPECIMEN COLLECT COVID-19  - predniSONE (DELTASONE) 20 MG tablet; Take 2 tablets (40 mg) by mouth daily  - azithromycin (ZITHROMAX) 250 MG tablet; Take 2 tablets (500 mg) by mouth daily for 1 day, THEN 1 tablet (250 mg) daily for 4 days.          Glenys Antunez, PA  [facility]    Subjective     Nursing Notes:   Florinda Cramer CMA  2021  8:22 AM  Signed  Chief Complaint   Patient presents with     Covid Concern     deep chest cough started last thursday, headache, vomitting, congestion, wheezing, using inhaler more frequently, low grade fever on and off, COVID test NEG friday morning     Pre-visit Screening:  Immunizations:  up to date  Colonoscopy:  is up to date  Mammogram: is up to date  Asthma Action Test/Plan:  NA  PHQ9:  NA  GAD7:  NA  Questioned patient about current smoking habits Pt. has never smoked.  Ok to leave detailed message on voice mail for today's visit only Yes, phone # [phone]              Priscilla Linda is a 71 year old female who presents to clinic today for the following health issues:     ALEXANDR Helms is here with URI sx that started on Wednesday with cough and rhinorrhea.  She typically gets  Bronchitis a couple times a year. This am she did have episode of emesis.  Temp 99.2 this am.  No sick contacts. Aunts  a week ago Friday.  Brother now has COVID and she was around him  But his mask was on the whole time. Has asthma - using albuterol a couple times a day since sx started.     OTC: Delsym, Benadryl     Rapid COVID Neg on Friday    Review of Systems     CONSTITUTIONAL: NEGATIVE for fever, chills, change in weight  + headache  EYES: NEGATIVE for vision changes or irritation  ENT/MOUTH: rhinorrhea, sinus congestion.   RESP: + SOB - dry cough.  CV: + chest pain with  "coughing  GI: vomiting today  Last few days has had more loose stool.               Objective    /72 (BP Location: Left arm, Patient Position: Sitting, Cuff Size: Adult Regular)   Pulse 75   Temp 98.1  F (36.7  C) (Temporal)   Ht 1.588 m (5' 2.5\")   Wt 79.4 kg (175 lb)   LMP  (LMP Unknown)   SpO2 94%   BMI 31.50 kg/m    Body mass index is 31.5 kg/m .     Physical Exam   GENERAL: healthy, alert and no distress  EYES: Eyes grossly normal to inspection, PERRL and conjunctivae and sclerae normal  HENT: ear canals and TM's normal, nose and mouth without ulcers or lesions  NECK: no adenopathy, no asymmetry, masses, or scars and thyroid normal to palpation  RESP: lungs clear to auscultation - no rales, rhonchi or wheezes  CV: regular rate and rhythm, normal S1 S2, no S3 or S4, no murmur, click or rub, no peripheral edema and peripheral pulses strong  MS: no gross musculoskeletal defects noted, no edema    Labs Resulted Today:   Results for orders placed or performed in visit on 11/22/21   COVID-19 ([facility])     Status: None   Result Value Ref Range    COVID-19 Negative        "

## 2021-11-22 NOTE — NURSING NOTE
Chief Complaint   Patient presents with     Covid Concern     deep chest cough started last thursday, headache, vomitting, congestion, wheezing, using inhaler more frequently, low grade fever on and off, COVID test NEG friday morning     Pre-visit Screening:  Immunizations:  up to date  Colonoscopy:  is up to date  Mammogram: is up to date  Asthma Action Test/Plan:  NA  PHQ9:  NA  GAD7:  NA  Questioned patient about current smoking habits Pt. has never smoked.  Ok to leave detailed message on voice mail for today's visit only Yes, phone # [phone]

## 2022-01-05 ENCOUNTER — MEDICAL CORRESPONDENCE (OUTPATIENT)
Dept: HEALTH INFORMATION MANAGEMENT | Facility: CLINIC | Age: 72
End: 2022-01-05
Payer: MEDICARE

## 2022-01-05 ENCOUNTER — TELEPHONE (OUTPATIENT)
Dept: FAMILY MEDICINE | Facility: CLINIC | Age: 72
End: 2022-01-05

## 2022-01-05 ENCOUNTER — OFFICE VISIT (OUTPATIENT)
Dept: FAMILY MEDICINE | Facility: CLINIC | Age: 72
End: 2022-01-05

## 2022-01-05 VITALS
TEMPERATURE: 98.1 F | DIASTOLIC BLOOD PRESSURE: 64 MMHG | SYSTOLIC BLOOD PRESSURE: 108 MMHG | HEIGHT: 63 IN | BODY MASS INDEX: 30.12 KG/M2 | OXYGEN SATURATION: 96 % | RESPIRATION RATE: 22 BRPM | HEART RATE: 64 BPM | WEIGHT: 170 LBS

## 2022-01-05 DIAGNOSIS — B00.9 HERPES SIMPLEX VIRUS (HSV) INFECTION: ICD-10-CM

## 2022-01-05 DIAGNOSIS — E78.2 MIXED HYPERLIPIDEMIA: ICD-10-CM

## 2022-01-05 DIAGNOSIS — R71.0 DECREASED HEMOGLOBIN: ICD-10-CM

## 2022-01-05 DIAGNOSIS — G47.33 OSA (OBSTRUCTIVE SLEEP APNEA): ICD-10-CM

## 2022-01-05 DIAGNOSIS — I10 ESSENTIAL HYPERTENSION, BENIGN: ICD-10-CM

## 2022-01-05 DIAGNOSIS — R42 VERTIGO: ICD-10-CM

## 2022-01-05 DIAGNOSIS — Z01.818 PREOP GENERAL PHYSICAL EXAM: Primary | ICD-10-CM

## 2022-01-05 DIAGNOSIS — I67.82 CEREBRAL ISCHEMIA: ICD-10-CM

## 2022-01-05 DIAGNOSIS — H02.403 PTOSIS, BILATERAL: ICD-10-CM

## 2022-01-05 DIAGNOSIS — B00.9 RECURRENT HERPES SIMPLEX: ICD-10-CM

## 2022-01-05 DIAGNOSIS — I65.22 ASYMPTOMATIC STENOSIS OF LEFT CAROTID ARTERY: ICD-10-CM

## 2022-01-05 DIAGNOSIS — G43.109 MIGRAINE WITH AURA AND WITHOUT STATUS MIGRAINOSUS, NOT INTRACTABLE: ICD-10-CM

## 2022-01-05 DIAGNOSIS — R73.01 ELEVATED FASTING GLUCOSE: ICD-10-CM

## 2022-01-05 DIAGNOSIS — J45.30 MILD PERSISTENT ASTHMA WITHOUT COMPLICATION: ICD-10-CM

## 2022-01-05 DIAGNOSIS — K21.9 GASTROESOPHAGEAL REFLUX DISEASE, UNSPECIFIED WHETHER ESOPHAGITIS PRESENT: ICD-10-CM

## 2022-01-05 PROBLEM — M67.432 GANGLION CYST OF WRIST, LEFT: Status: RESOLVED | Noted: 2018-10-10 | Resolved: 2022-01-05

## 2022-01-05 LAB
BUN SERPL-MCNC: 18 MG/DL (ref 7–25)
BUN/CREATININE RATIO: 18.6 (ref 6–22)
CALCIUM SERPL-MCNC: 10.2 MG/DL (ref 8.6–10.3)
CHLORIDE SERPLBLD-SCNC: 100.2 MMOL/L (ref 98–110)
CO2 SERPL-SCNC: 28.2 MMOL/L (ref 20–32)
CREAT SERPL-MCNC: 0.97 MG/DL (ref 0.6–1.3)
ERYTHROCYTE [DISTWIDTH] IN BLOOD BY AUTOMATED COUNT: 15 %
GLUCOSE SERPL-MCNC: 102 MG/DL (ref 60–99)
HCT VFR BLD AUTO: 36.3 % (ref 35–47)
HEMOGLOBIN: 11.6 G/DL (ref 11.7–15.7)
MCH RBC QN AUTO: 24.5 PG (ref 26–33)
MCHC RBC AUTO-ENTMCNC: 32 G/DL (ref 31–36)
MCV RBC AUTO: 76.8 FL (ref 78–100)
PLATELET COUNT - QUEST: 407 10^9/L (ref 150–375)
POTASSIUM SERPL-SCNC: 3.64 MMOL/L (ref 3.5–5.3)
RBC # BLD AUTO: 4.73 10*12/L (ref 3.8–5.2)
SODIUM SERPL-SCNC: 138.6 MMOL/L (ref 135–146)
WBC # BLD AUTO: 12 10*9/L (ref 4–11)

## 2022-01-05 PROCEDURE — 80048 BASIC METABOLIC PNL TOTAL CA: CPT | Performed by: PHYSICIAN ASSISTANT

## 2022-01-05 PROCEDURE — 99214 OFFICE O/P EST MOD 30 MIN: CPT | Performed by: PHYSICIAN ASSISTANT

## 2022-01-05 PROCEDURE — 36415 COLL VENOUS BLD VENIPUNCTURE: CPT | Performed by: PHYSICIAN ASSISTANT

## 2022-01-05 PROCEDURE — 85027 COMPLETE CBC AUTOMATED: CPT | Performed by: PHYSICIAN ASSISTANT

## 2022-01-05 ASSESSMENT — MIFFLIN-ST. JEOR: SCORE: 1252.3

## 2022-01-05 NOTE — PROGRESS NOTES
[facility] PHYSICIANS  [address] [place] [address]  [address]  [facility] [address]  Phone: [phone]  Fax: [phone]  Primary Provider: Tello Galeana  Pre-op Performing Provider: TELLO GALEANA      PREOPERATIVE EVALUATION:  Today's date: 1/5/2022    Priscilla Linda is a 71 year old female who presents for a preoperative evaluation.    Surgical Information:  Surgery/Procedure: Bilateral blepharoplasty  Surgery Location: [facility] [place]   Surgeon: Dr. Buchanan  Surgery Date: 1/10/2022  Time of Surgery: TBD  Where patient plans to recover: At home with family  Fax number for surgical facility: [phone]    Type of Anesthesia Anticipated: to be determined    Assessment & Plan     The proposed surgical procedure is considered LOW risk.    1. Preop general physical exam    2. Ptosis, bilateral    3. Mild persistent asthma without complication    4. Migraine with aura and without status migrainosus, not intractable    5. Asymptomatic stenosis of left carotid artery - US 9/21 recommended    6. Cerebral ischemia- small vessel seen on MRI 2020    7. Vertigo    8. HELLEN (obstructive sleep apnea)    9. Elevated fasting glucose    10. Gastroesophageal reflux disease, unspecified whether esophagitis present    11. Mixed hyperlipidemia    12. Herpes simplex virus (HSV) infection    13. Essential hypertension, benign    14. Decreased hemoglobin    15. Recurrent herpes simplex              Risks and Recommendations:  The patient has the following additional risks and recommendations for perioperative complications:  Obstructive Sleep Apnea:   Pt has Mild HELLEN  - not using CPAP    Medication Instructions:  Hold all medications am of surgery except Beta Blocker    RECOMMENDATION:  APPROVAL GIVEN to proceed with proposed procedure, without further diagnostic evaluation.      Slight drop in Hemoglobin this year - iron studies pending, UTD on Colonoscopy    ADDENDUM: PT CALLED TO LET ME KNOW  SHE DONATED BLOOD LAST WEEK  NO FURTHER WORKUP NEEDED    REFILLS OF ACYCLOVIR REQUESTED    Subjective     HPI related to upcoming procedure:  Decreased vision bilaterally due to ptosis    1. No - Have you ever had a heart attack or stroke?  2. No - Have you ever had surgery on your heart or blood vessels, such as a stent, coronary (heart) bypass, or surgery on an artery in the head, neck, heart, or legs?  3. No - Do you have chest pain when you are physically active?  4. No - Do you have a history of heart failure?  5. No - Do you currently have a cold, bronchitis, or symptoms of other respiratory (head and chest) infections?  6. No - Do you have a cough, shortness of breath, or wheezing?  7. No - Do you or anyone in your family have a history of blood clots?  8. No - Do you or anyone in your family have a serious bleeding problem, such as long-lasting bleeding after surgeries or cuts?  9. No - Have you ever had anemia or been told to take iron pills?  10. No - Have you had any abnormal blood loss such as black, tarry or bloody stools, or abnormal vaginal bleeding?  11. No - Have you ever had a blood transfusion?  12. Yes - Are you willing to have a blood transfusion if it is medically needed before, during, or after your surgery?  13. No - Have you or anyone in your family ever had problems with anesthesia (sedation for surgery)?  14. YES - Do you have sleep apnea, excessive snoring, or daytime drowsiness?   15. No - Do you have any artifical heart valves or other implanted medical devices, such as a pacemaker, defibrillator, or continuous glucose monitor?  16. No - Do you have any artifical joints?  17. No - Are you allergic to latex?  18. No - Is there any chance that you may be pregnant?    Health Care Directive:  Patient has a Health Care Directive on file      Preoperative Review of :   reviewed - No longer on prescribed medications listed      Status of Chronic Conditions:  ASTHMA - Patient has a  longstanding history of moderate-severe Asthma . Patient has been doing well overall noting NO SYMPTOMS and continues on medication regimen consisting of Pulmicort without adverse reactions or side effects.     HYPERLIPIDEMIA - Patient has a long history of significant Hyperlipidemia requiring medication for treatment with recent good control. Patient reports no problems or side effects with the medication.     HYPERTENSION - Patient has longstanding history of HTN , currently denies any symptoms referable to elevated blood pressure. Specifically denies chest pain, palpitations, dyspnea, orthopnea, PND or peripheral edema. Blood pressure readings have been in normal range. Current medication regimen is as listed below. Patient denies any side effects of medication.       Review of Systems  Constitutional, neuro, ENT, endocrine, pulmonary, cardiac, gastrointestinal, genitourinary, musculoskeletal, integument and psychiatric systems are negative, except as otherwise noted.    Patient Active Problem List    Diagnosis Date Noted     Osteopenia of neck of left femur - repeat 2023 09/14/2021     Priority: Medium     Elevated fasting glucose 09/14/2021     Priority: Medium     Gastroesophageal reflux disease, unspecified whether esophagitis present 09/14/2021     Priority: Medium     HELLEN (obstructive sleep apnea) 01/22/2021     Priority: Medium     Mild persistent asthma without complication 12/03/2020     Priority: Medium     Migraine with aura and without status migrainosus, not intractable 12/03/2020     Priority: Medium     Asymptomatic stenosis of left carotid artery - US 9/21 recommended 09/10/2020     Priority: Medium     Cerebral ischemia- small vessel seen on MRI 2020 09/10/2020     Priority: Medium     Vertigo 09/03/2020     Priority: Medium     Health Care Home 10/17/2012     Priority: Medium     State Tier Level:  Tier 2  Status:  NA   Care Coordinator:      See Letters for [facility] Care Plan           Essential  hypertension, benign 03/21/2012     Priority: Medium     Mixed hyperlipidemia 02/12/2010     Priority: Medium     Ocular migraine 02/12/2010     Priority: Medium     Herpes simplex virus (HSV) infection 02/12/2010     Priority: Medium      Past Medical History:   Diagnosis Date     Asthma      Ganglion cyst of wrist, left 10/10/2018     GERD (gastroesophageal reflux disease)      High cholesterol      Hypertension      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    Hx of Clomid       Past Surgical History:   Procedure Laterality Date     CATARACT IOL, RT/LT  2007    Right and Left     COLONOSCOPY  06/2013    polyps, repeat in 3 years     COLONOSCOPY  08/2016    tub adenoma/ repeat 5 years     FOOT SURGERY Right 10/2018    fusion      FOOT SURGERY Left 2020     TUBAL LIGATION  1983     UPPER GI ENDOSCOPY  2003    hiatal hernia     Z NONSPECIFIC PROCEDURE  1999    ganglion cyst     Z TOTAL ABDOM HYSTERECTOMY  1995    prolapse, marshal chay/ BSO     ZZ COLONOSCOPY THRU STOMA, DIAGNOSTIC  2003    hx of iron defic/ two normal colonoscopies     Current Outpatient Medications   Medication Sig Dispense Refill     acetaminophen (TYLENOL) 500 MG tablet Take 500 mg by mouth 2 times daily as needed for mild pain       acyclovir (ZOVIRAX) 400 MG tablet Take 1 tablet (400 mg) by mouth every 8 hours for 5 days 15 tablet 3     albuterol (PROAIR HFA/PROVENTIL HFA/VENTOLIN HFA) 108 (90 Base) MCG/ACT inhaler Inhale 2 puffs into the lungs every 6 hours 18 g 1     aspirin (ASA) 81 MG EC tablet Take 1 tablet (81 mg) by mouth daily       atorvastatin (LIPITOR) 80 MG tablet Take 1 tablet (80 mg) by mouth daily 90 tablet 3     budesonide (PULMICORT FLEXHALER) 180 MCG/ACT inhaler Inhale 1 puff into the lungs 2 times daily 3 each 3     calcium carbonate (TUMS) 500 MG chewable tablet Take 1 chew tab by mouth daily       ibuprofen (ADVIL/MOTRIN) 200 MG tablet Take 200 mg by mouth 2 times daily as needed for mild pain       magnesium 250 MG tablet  "Take 1 tablet by mouth 2 times daily       metoprolol tartrate (LOPRESSOR) 25 MG tablet Take 1 tablet (25 mg) by mouth 2 times daily 180 tablet 3     montelukast (SINGULAIR) 10 MG tablet Take 1 tablet (10 mg) by mouth At Bedtime 90 tablet 3     MULTI-VITAMIN/IRON OR TABS 1 TABLET DAILY       omeprazole (PRILOSEC) 20 MG DR capsule Take 1 capsule (20 mg) by mouth daily 90 capsule 3     riboflavin (VITAMIN  B-2) 100 MG TABS tablet Take 25 mg by mouth daily       triamterene-HCTZ (MAXZIDE-25) 37.5-25 MG tablet Take 1 tablet by mouth daily 90 tablet 1       No Known Allergies     Social History     Tobacco Use     Smoking status: Never Smoker     Smokeless tobacco: Never Used   Substance Use Topics     Alcohol use: Yes     Alcohol/week: 0.0 standard drinks     Comment: very occ     Family History   Problem Relation Age of Onset     Diabetes Father          age 75 complications     Hypertension Father      Cerebrovascular Disease Father      Hypertension Mother      Diabetes Type 1 Sister         type 1     Diabetes Type 2  Brother      Diabetes Type 2  Sister      Other - See Comments Brother         DVT     Factor V Leiden deficiency Daughter      Blood Disease No family hx of         family hx of hemochromatosis     History   Drug Use No         Objective     /64 (BP Location: Left arm, Patient Position: Sitting, Cuff Size: Adult Large)   Pulse 64   Temp 98.1  F (36.7  C) (Temporal)   Resp 22   Ht 1.588 m (5' 2.5\")   Wt 77.1 kg (170 lb)   LMP  (LMP Unknown)   SpO2 96%   BMI 30.60 kg/m      Physical Exam    GENERAL APPEARANCE: healthy, alert and no distress     EYES: EOMI, PERRL     HENT: ear canals and TM's normal and nose and mouth without ulcers or lesions     NECK: no adenopathy, no asymmetry, masses, or scars and thyroid normal to palpation     RESP: lungs clear to auscultation - no rales, rhonchi or wheezes     CV: regular rates and rhythm, normal S1 S2, no S3 or S4 and no murmur, click or rub    "  ABDOMEN:  soft, nontender, no HSM or masses and bowel sounds normal     MS: extremities normal- no gross deformities noted, no evidence of inflammation in joints, FROM in all extremities.     SKIN: no suspicious lesions or rashes     NEURO: Normal strength and tone, sensory exam grossly normal, mentation intact and speech normal     PSYCH: mentation appears normal. and affect normal/bright     LYMPHATICS: No cervical adenopathy    Recent Labs   Lab Test 09/14/21  1208 09/14/21  0000 01/22/21  1301 01/22/21  1207 09/03/20  0743 09/02/20  2036   HGB  --   --   --  14.5  --  15.0   PLT  --   --   --  325  --  330   INR  --   --   --   --   --  0.94   NA  --  141.5 141.7  --    < > 139   POTASSIUM  --  3.74 4.25  --    < > 3.3*   CR  --  1.08 0.99  --    < > 0.86   A1C 5.8  --   --   --   --   --     < > = values in this interval not displayed.        Diagnostics:  Recent Results (from the past 24 hour(s))   Hemogram with Platelets ([facility])    Collection Time: 01/05/22 11:39 AM   Result Value Ref Range    WBC 12.0 (A) 4.0 - 11 10*9/L    RBC Count 4.73 3.8 - 5.2 10*12/L    Hemoglobin 11.6 (A) 11.7 - 15.7 g/dL    Hematocrit 36.3 35.0 - 47.0 %    MCV 76.8 (A) 78 - 100 fL    MCH 24.5 (A) 26 - 33 pg    MCHC 32.0 31 - 36 g/dL    RDW 15.0 %    Platelet Count 407 (A) 150 - 375 10^9/L   IRON AND IRON BINDING CAPACITY ([facility])    Collection Time: 01/05/22 12:34 PM   Result Value Ref Range    Iron 43 (L) 45 - 160 mcg/dL    TIBC 460 (H) 250 - 450 mcg/dL (calc)    % Saturation 9 (L) 16 - 45 % (calc)   FERRITIN ([facility])    Collection Time: 01/05/22 12:34 PM   Result Value Ref Range    Ferritin 5 (L) 16 - 288 ng/mL   Basic Metabolic Panel ([facility])    Collection Time: 01/05/22  2:55 PM   Result Value Ref Range    Carbon Dioxide 28.2 20 - 32 mmol/L    Creatinine 0.97 0.60 - 1.30 mg/dL    Glucose 102 (A) 60 - 99 mg/dL    Sodium 138.6 135 - 146 mmol/L    Potassium 3.64 3.5 - 5.3 mmol/L    Chloride 100.2 98 - 110 mmol/L    Urea Nitrogen 18 7 - 25  mg/dL    Calcium 10.2 8.6 - 10.3 mg/dL    BUN/Creatinine Ratio 18.6 6 - 22      No EKG required for low risk surgery (cataract, skin procedure, breast biopsy, etc).    Revised Cardiac Risk Index (RCRI):  The patient has the following serious cardiovascular risks for perioperative complications:   - No serious cardiac risks = 0 points     RCRI Interpretation: 0 points: Class I (very low risk - 0.4% complication rate)           Signed Electronically by: VINICIUS Lopez  Copy of this evaluation report is provided to requesting physician.

## 2022-01-05 NOTE — TELEPHONE ENCOUNTER
Pt calling in asking to check if we had placed her covid order for her to do, as she is hoping to get her procedure done soon. Preop was done, pt very frustrated as been transferred around. I mentioned she reached the covid resulting department, that I could not help assist in checking faxes/etc. Apologized for the inconverinece. Pt was understanding, I mentioned to try and contact the that is performing her surgery for clarification. Pt has direct # will plan to contact

## 2022-01-06 ENCOUNTER — OFFICE VISIT (OUTPATIENT)
Dept: FAMILY MEDICINE | Facility: CLINIC | Age: 72
End: 2022-01-06

## 2022-01-06 VITALS
DIASTOLIC BLOOD PRESSURE: 68 MMHG | OXYGEN SATURATION: 99 % | HEART RATE: 79 BPM | HEIGHT: 63 IN | TEMPERATURE: 97.9 F | BODY MASS INDEX: 30.12 KG/M2 | SYSTOLIC BLOOD PRESSURE: 114 MMHG | WEIGHT: 170 LBS

## 2022-01-06 DIAGNOSIS — R10.2 PELVIC PRESSURE IN FEMALE: Primary | ICD-10-CM

## 2022-01-06 DIAGNOSIS — Z01.818 PREOP GENERAL PHYSICAL EXAM: ICD-10-CM

## 2022-01-06 LAB
% SATURATION - QUEST: 9 % (CALC) (ref 16–45)
APPEARANCE UR: CLEAR
BACTERIA, UR: ABNORMAL
BILIRUB UR QL: ABNORMAL
CASTS/LPF: ABNORMAL
COLOR UR: YELLOW
EP/HPF: ABNORMAL
FERRITIN SERPL-MCNC: 5 NG/ML (ref 16–288)
GLUCOSE URINE: ABNORMAL MG/DL
HGB UR QL: ABNORMAL
IRON: 43 MCG/DL (ref 45–160)
KETONES UR QL: ABNORMAL MG/DL
MISC.: ABNORMAL
NITRITE UR QL STRIP: ABNORMAL
PH UR STRIP: 5.5 PH (ref 5–7)
PROT UR QL: ABNORMAL MG/DL
RBC, UR MICRO: ABNORMAL (ref ?–2)
SP GR UR STRIP: 1.02 (ref 1–1.03)
TIBC - QUEST: 460 MCG/DL (CALC) (ref 250–450)
UROBILINOGEN UR QL STRIP: 0.2 EU/DL (ref 0.2–1)
WBC #/AREA URNS HPF: ABNORMAL /[HPF]
WBC, UR MICRO: ABNORMAL (ref ?–2)

## 2022-01-06 PROCEDURE — 99213 OFFICE O/P EST LOW 20 MIN: CPT | Performed by: PHYSICIAN ASSISTANT

## 2022-01-06 PROCEDURE — 81001 URINALYSIS AUTO W/SCOPE: CPT | Performed by: PHYSICIAN ASSISTANT

## 2022-01-06 RX ORDER — ACYCLOVIR 400 MG/1
400 TABLET ORAL EVERY 8 HOURS
Qty: 15 TABLET | Refills: 3 | Status: SHIPPED | OUTPATIENT
Start: 2022-01-06 | End: 2022-07-08

## 2022-01-06 ASSESSMENT — MIFFLIN-ST. JEOR: SCORE: 1252.3

## 2022-01-06 NOTE — PROGRESS NOTES
"CC: Pelvic Pressure    History:  Patient was here for a pre-operative visit yesterday and was feeling normal. Vague pelvic pressure started last night. Central lower abdomen. No urinary pain, urgency, but possibly frequency. Has been able to pass gas, have 2 small bowel movements that were more formed than usual. No blood, dark colors .Usually has 4-5 loose BMs daily. No vaginal symptoms. Denies any recent muscular injury, new activity, other than shoveling which she has done before.      Scooter also explains that she was having extreme difficulty scheduling a PCR test in time for her surgery on Monday. As of now, it is scheduled for this Saturday.     PMH, MEDICATIONS, ALLERGIES, SOCIAL AND FAMILY HISTORY in [facility] and reviewed by me personally.    ROS negative other than the symptoms noted above in the HPI.    Examination   /68 (BP Location: Left arm, Patient Position: Sitting, Cuff Size: Adult Large)   Pulse 79   Temp 97.9  F (36.6  C) (Temporal)   Ht 1.588 m (5' 2.5\")   Wt 77.1 kg (170 lb)   LMP  (LMP Unknown)   SpO2 99%   BMI 30.60 kg/m         Constitutional: Sitting comfortably, in no acute distress. Vital signs noted  Neck:  no adenopathy, trachea midline and normal to palpation, thyroid normal to palpation  Cardiovascular:  regular rate and rhythm, no murmurs, clicks, or gallops  Respiratory:  normal respiratory rate and rhythm, lungs clear to auscultation  Abdomen: Abdomen soft, non-tender. BS normal. No masses, organomegaly  M/S: No CVA tenderness.   SKIN: No jaundice/pallor/rash.   Psychiatric: mentation appears normal and affect normal/bright        A/P    ICD-10-CM    1. Pelvic pressure in female  R10.2 URINALYSIS, ROUTINE ([facility])   2. Preop general physical exam  Z01.818 Asymptomatic COVID-19 Virus (Coronavirus) by PCR       DISCUSSION:  UA today completely normal. Explained possible causes including muscular, bowel. Agreed to monitor, do some low back, hip, quad/ham stretching. Also will start " stool softener to return stool to normal. She will contact me tomorrow with update if worsening, especially with upcoming surgery. For now, symptoms are mild and not concerning for surgery especially with reassuring UA.     Will place standing order for Covid-19 PCR to ensure she can have this collected Saturday.     follow up visit: As needed    Danyelle Patten PA-C  [facility]

## 2022-01-06 NOTE — NURSING NOTE
Chief Complaint   Patient presents with     Abdominal Cramping     lower pelvic cramping, possible UTI, sx started overnight         Pre-visit Screening:  Immunizations:  up to date  Colonoscopy:  UTD  Mammogram: UTD  Asthma Action Test/Plan:  NA  PHQ9:  NA  GAD7:  NA  Questioned patient about current smoking habits Pt. has never smoked.  Ok to leave detailed message on voice mail for today's visit only Yes, phone # [phone]

## 2022-01-07 ENCOUNTER — LAB (OUTPATIENT)
Dept: URGENT CARE | Facility: URGENT CARE | Age: 72
End: 2022-01-07
Payer: MEDICARE

## 2022-01-07 DIAGNOSIS — Z01.818 PREOP GENERAL PHYSICAL EXAM: ICD-10-CM

## 2022-01-07 PROCEDURE — U0003 INFECTIOUS AGENT DETECTION BY NUCLEIC ACID (DNA OR RNA); SEVERE ACUTE RESPIRATORY SYNDROME CORONAVIRUS 2 (SARS-COV-2) (CORONAVIRUS DISEASE [COVID-19]), AMPLIFIED PROBE TECHNIQUE, MAKING USE OF HIGH THROUGHPUT TECHNOLOGIES AS DESCRIBED BY CMS-2020-01-R: HCPCS

## 2022-01-07 PROCEDURE — U0005 INFEC AGEN DETEC AMPLI PROBE: HCPCS

## 2022-01-08 LAB — SARS-COV-2 RNA RESP QL NAA+PROBE: NEGATIVE

## 2022-02-14 ENCOUNTER — HOSPITAL ENCOUNTER (OUTPATIENT)
Dept: MAMMOGRAPHY | Facility: CLINIC | Age: 72
Discharge: HOME | End: 2022-02-14
Attending: PHYSICIAN ASSISTANT | Admitting: PHYSICIAN ASSISTANT
Payer: MEDICARE

## 2022-02-14 DIAGNOSIS — Z12.31 VISIT FOR SCREENING MAMMOGRAM: ICD-10-CM

## 2022-02-14 PROCEDURE — 77067 SCR MAMMO BI INCL CAD: CPT

## 2022-02-19 ENCOUNTER — HEALTH MAINTENANCE LETTER (OUTPATIENT)
Age: 72
End: 2022-02-19

## 2022-03-07 ENCOUNTER — APPOINTMENT (OUTPATIENT)
Dept: URBAN - METROPOLITAN AREA CLINIC 253 | Age: 72
Setting detail: DERMATOLOGY
End: 2022-03-09

## 2022-03-07 VITALS — HEIGHT: 63 IN | WEIGHT: 170 LBS | RESPIRATION RATE: 14 BRPM

## 2022-03-07 DIAGNOSIS — L81.4 OTHER MELANIN HYPERPIGMENTATION: ICD-10-CM

## 2022-03-07 DIAGNOSIS — D17 BENIGN LIPOMATOUS NEOPLASM: ICD-10-CM

## 2022-03-07 DIAGNOSIS — D22 MELANOCYTIC NEVI: ICD-10-CM

## 2022-03-07 DIAGNOSIS — L82.1 OTHER SEBORRHEIC KERATOSIS: ICD-10-CM

## 2022-03-07 DIAGNOSIS — L70.8 OTHER ACNE: ICD-10-CM

## 2022-03-07 DIAGNOSIS — D18.0 HEMANGIOMA: ICD-10-CM

## 2022-03-07 DIAGNOSIS — Z71.89 OTHER SPECIFIED COUNSELING: ICD-10-CM

## 2022-03-07 PROBLEM — D18.01 HEMANGIOMA OF SKIN AND SUBCUTANEOUS TISSUE: Status: ACTIVE | Noted: 2022-03-07

## 2022-03-07 PROBLEM — D22.5 MELANOCYTIC NEVI OF TRUNK: Status: ACTIVE | Noted: 2022-03-07

## 2022-03-07 PROBLEM — D17.21 BENIGN LIPOMATOUS NEOPLASM OF SKIN AND SUBCUTANEOUS TISSUE OF RIGHT ARM: Status: ACTIVE | Noted: 2022-03-07

## 2022-03-07 PROCEDURE — 99213 OFFICE O/P EST LOW 20 MIN: CPT

## 2022-03-07 PROCEDURE — OTHER COUNSELING: OTHER

## 2022-03-07 PROCEDURE — OTHER MIPS QUALITY: OTHER

## 2022-03-07 ASSESSMENT — LOCATION SIMPLE DESCRIPTION DERM
LOCATION SIMPLE: UPPER BACK
LOCATION SIMPLE: RIGHT POSTERIOR UPPER ARM
LOCATION SIMPLE: LEFT LIP

## 2022-03-07 ASSESSMENT — LOCATION DETAILED DESCRIPTION DERM
LOCATION DETAILED: LEFT UPPER CUTANEOUS LIP
LOCATION DETAILED: RIGHT DISTAL POSTERIOR UPPER ARM
LOCATION DETAILED: INFERIOR THORACIC SPINE

## 2022-03-07 ASSESSMENT — LOCATION ZONE DERM
LOCATION ZONE: TRUNK
LOCATION ZONE: ARM
LOCATION ZONE: LIP

## 2022-03-07 NOTE — PROCEDURE: COUNSELING
Detail Level: Detailed
Patient Specific Counseling (Will Not Stick From Patient To Patient): Discussed excision if wanting removed.
Detail Level: Generalized
Patient Specific Counseling (Will Not Stick From Patient To Patient): Extracted at no charge.

## 2022-04-11 ENCOUNTER — APPOINTMENT (OUTPATIENT)
Dept: URBAN - METROPOLITAN AREA CLINIC 253 | Age: 72
Setting detail: DERMATOLOGY
End: 2022-04-13

## 2022-04-11 VITALS — RESPIRATION RATE: 14 BRPM | HEIGHT: 63 IN | WEIGHT: 293 LBS

## 2022-04-11 DIAGNOSIS — D17 BENIGN LIPOMATOUS NEOPLASM: ICD-10-CM

## 2022-04-11 PROBLEM — D17.21 BENIGN LIPOMATOUS NEOPLASM OF SKIN AND SUBCUTANEOUS TISSUE OF RIGHT ARM: Status: ACTIVE | Noted: 2022-04-11

## 2022-04-11 PROCEDURE — OTHER EXCISION: OTHER

## 2022-04-11 PROCEDURE — OTHER PRESCRIPTION: OTHER

## 2022-04-11 PROCEDURE — 11402 EXC TR-EXT B9+MARG 1.1-2 CM: CPT

## 2022-04-11 PROCEDURE — 12031 INTMD RPR S/A/T/EXT 2.5 CM/<: CPT

## 2022-04-11 PROCEDURE — OTHER ADDITIONAL NOTES: OTHER

## 2022-04-11 RX ORDER — DOXYCYCLINE 100 MG/1
100 MG CAPSULE ORAL BID
Qty: 14 | Refills: 0 | Status: ERX | COMMUNITY
Start: 2022-04-11

## 2022-04-11 RX ORDER — CHLORHEXIDINE GLUCONATE 213 G/1000ML
4% SOLUTION TOPICAL QD
Qty: 946 | Refills: 0 | Status: ERX | COMMUNITY
Start: 2022-04-11

## 2022-04-11 ASSESSMENT — LOCATION DETAILED DESCRIPTION DERM: LOCATION DETAILED: RIGHT DISTAL POSTERIOR UPPER ARM

## 2022-04-11 ASSESSMENT — LOCATION ZONE DERM: LOCATION ZONE: ARM

## 2022-04-11 ASSESSMENT — LOCATION SIMPLE DESCRIPTION DERM: LOCATION SIMPLE: RIGHT POSTERIOR UPPER ARM

## 2022-04-11 NOTE — PROCEDURE: ADDITIONAL NOTES
Detail Level: Zone
Additional Notes: Pt will trim suture tails on her own at home
Render Risk Assessment In Note?: no

## 2022-04-11 NOTE — PROCEDURE: EXCISION
O-L Flap Text: The defect edges were debeveled with a #15 scalpel blade.  Given the location of the defect, shape of the defect and the proximity to free margins an O-L flap was deemed most appropriate.  Using a sterile surgical marker, an appropriate advancement flap was drawn incorporating the defect and placing the expected incisions within the relaxed skin tension lines where possible.    The area thus outlined was incised deep to adipose tissue with a #15 scalpel blade.  The skin margins were undermined to an appropriate distance in all directions utilizing iris scissors.
Saucerization Depth: dermis and superficial adipose tissue
No Repair - Repaired With Adjacent Surgical Defect Text (Leave Blank If You Do Not Want): After the excision the defect was repaired concurrently with another surgical defect which was in close approximation.
Retention Suture Text: Retention sutures were placed to support the closure and prevent dehiscence.
Surgeon Performing Repair (Optional): Anabel Carroll PA-C
Eliptical Excision Additional Text (Leave Blank If You Do Not Want): The margin was drawn around the clinically apparent lesion.  An elliptical shape was then drawn on the skin incorporating the lesion and margins.  Incisions were then made along these lines to the appropriate tissue plane and the lesion was extirpated.
Suturegard Body: The suture ends were repeatedly re-tightened and re-clamped to achieve the desired tissue expansion.
Primary Defect Width (In Cm): 0
Retention Suture Bite Size: 3 mm
Complex Repair And Skin Substitute Graft Text: The defect edges were debeveled with a #15 scalpel blade.  The primary defect was closed partially with a complex linear closure.  Given the location of the remaining defect, shape of the defect and the proximity to free margins a skin substitute graft was deemed most appropriate to repair the remaining defect.  The graft was trimmed to fit the size of the remaining defect.  The graft was then placed in the primary defect, oriented appropriately, and sutured into place.
Show Previous Accession Variable: Yes
O-Z Flap Text: The defect edges were debeveled with a #15 scalpel blade.  Given the location of the defect, shape of the defect and the proximity to free margins an O-Z flap was deemed most appropriate.  Using a sterile surgical marker, an appropriate transposition flap was drawn incorporating the defect and placing the expected incisions within the relaxed skin tension lines where possible. The area thus outlined was incised deep to adipose tissue with a #15 scalpel blade.  The skin margins were undermined to an appropriate distance in all directions utilizing iris scissors.
Banner Transposition Flap Text: The defect edges were debeveled with a #15 scalpel blade.  Given the location of the defect and the proximity to free margins a Banner transposition flap was deemed most appropriate.  Using a sterile surgical marker, an appropriate flap drawn around the defect. The area thus outlined was incised deep to adipose tissue with a #15 scalpel blade.  The skin margins were undermined to an appropriate distance in all directions utilizing iris scissors.
Bill For Surgical Tray: no
Posterior Auricular Interpolation Flap Text: A decision was made to reconstruct the defect utilizing an interpolation axial flap and a staged reconstruction.  A telfa template was made of the defect.  This telfa template was then used to outline the posterior auricular interpolation flap.  The donor area for the pedicle flap was then injected with anesthesia.  The flap was excised through the skin and subcutaneous tissue down to the layer of the underlying musculature.  The pedicle flap was carefully excised within this deep plane to maintain its blood supply.  The edges of the donor site were undermined.   The donor site was closed in a primary fashion.  The pedicle was then rotated into position and sutured.  Once the tube was sutured into place, adequate blood supply was confirmed with blanching and refill.  The pedicle was then wrapped with xeroform gauze and dressed appropriately with a telfa and gauze bandage to ensure continued blood supply and protect the attached pedicle.
Complex Repair And Double Advancement Flap Text: The defect edges were debeveled with a #15 scalpel blade.  The primary defect was closed partially with a complex linear closure.  Given the location of the remaining defect, shape of the defect and the proximity to free margins a double advancement flap was deemed most appropriate for complete closure of the defect.  Using a sterile surgical marker, an appropriate advancement flap was drawn incorporating the defect and placing the expected incisions within the relaxed skin tension lines where possible.    The area thus outlined was incised deep to adipose tissue with a #15 scalpel blade.  The skin margins were undermined to an appropriate distance in all directions utilizing iris scissors.
Suturegard Retention Suture: 2-0 Nylon
Composite Graft Text: The defect edges were debeveled with a #15 scalpel blade.  Given the location of the defect, shape of the defect, the proximity to free margins and the fact the defect was full thickness a composite graft was deemed most appropriate.  The defect was outline and then transferred to the donor site.  A full thickness graft was then excised from the donor site. The graft was then placed in the primary defect, oriented appropriately and then sutured into place.  The secondary defect was then repaired using a primary closure.
Double Island Pedicle Flap Text: The defect edges were debeveled with a #15 scalpel blade.  Given the location of the defect, shape of the defect and the proximity to free margins a double island pedicle advancement flap was deemed most appropriate.  Using a sterile surgical marker, an appropriate advancement flap was drawn incorporating the defect, outlining the appropriate donor tissue and placing the expected incisions within the relaxed skin tension lines where possible.    The area thus outlined was incised deep to adipose tissue with a #15 scalpel blade.  The skin margins were undermined to an appropriate distance in all directions around the primary defect and laterally outward around the island pedicle utilizing iris scissors.  There was minimal undermining beneath the pedicle flap.
Xenograft Text: The defect edges were debeveled with a #15 scalpel blade.  Given the location of the defect, shape of the defect and the proximity to free margins a xenograft was deemed most appropriate.  The graft was then trimmed to fit the size of the defect.  The graft was then placed in the primary defect and oriented appropriately.
H Plasty Text: Given the location of the defect, shape of the defect and the proximity to free margins a H-plasty was deemed most appropriate for repair.  Using a sterile surgical marker, the appropriate advancement arms of the H-plasty were drawn incorporating the defect and placing the expected incisions within the relaxed skin tension lines where possible. The area thus outlined was incised deep to adipose tissue with a #15 scalpel blade. The skin margins were undermined to an appropriate distance in all directions utilizing iris scissors.  The opposing advancement arms were then advanced into place in opposite direction and anchored with interrupted buried subcutaneous sutures.
Complex Repair And V-Y Plasty Text: The defect edges were debeveled with a #15 scalpel blade.  The primary defect was closed partially with a complex linear closure.  Given the location of the remaining defect, shape of the defect and the proximity to free margins a V-Y plasty was deemed most appropriate for complete closure of the defect.  Using a sterile surgical marker, an appropriate advancement flap was drawn incorporating the defect and placing the expected incisions within the relaxed skin tension lines where possible.    The area thus outlined was incised deep to adipose tissue with a #15 scalpel blade.  The skin margins were undermined to an appropriate distance in all directions utilizing iris scissors.
Medical Necessity Information: It is in your best interest to select a reason for this procedure from the list below. All of these items fulfill various CMS LCD requirements except lesion extends to a margin.
Ear Star Wedge Flap Text: The defect edges were debeveled with a #15 blade scalpel.  Given the location of the defect and the proximity to free margins (helical rim) an ear star wedge flap was deemed most appropriate.  Using a sterile surgical marker, the appropriate flap was drawn incorporating the defect and placing the expected incisions between the helical rim and antihelix where possible.  The area thus outlined was incised through and through with a #15 scalpel blade.
Repair Performed By Another Provider Text (Leave Blank If You Do Not Want): After the tissue was excised the defect was repaired by another provider.
Wound Care: Petrolatum
Crescentic Advancement Flap Text: The defect edges were debeveled with a #15 scalpel blade.  Given the location of the defect and the proximity to free margins a crescentic advancement flap was deemed most appropriate.  Using a sterile surgical marker, the appropriate advancement flap was drawn incorporating the defect and placing the expected incisions within the relaxed skin tension lines where possible.    The area thus outlined was incised deep to adipose tissue with a #15 scalpel blade.  The skin margins were undermined to an appropriate distance in all directions utilizing iris scissors.
Double M-Plasty Complex Repair Preamble Text (Leave Blank If You Do Not Want): Extensive wide undermining was performed.
Island Pedicle Flap Text: The defect edges were debeveled with a #15 scalpel blade.  Given the location of the defect, shape of the defect and the proximity to free margins an island pedicle advancement flap was deemed most appropriate.  Using a sterile surgical marker, an appropriate advancement flap was drawn incorporating the defect, outlining the appropriate donor tissue and placing the expected incisions within the relaxed skin tension lines where possible.    The area thus outlined was incised deep to adipose tissue with a #15 scalpel blade.  The skin margins were undermined to an appropriate distance in all directions around the primary defect and laterally outward around the island pedicle utilizing iris scissors.  There was minimal undermining beneath the pedicle flap.
Modified Advancement Flap Text: The defect edges were debeveled with a #15 scalpel blade.  Given the location of the defect, shape of the defect and the proximity to free margins a modified advancement flap was deemed most appropriate.  Using a sterile surgical marker, an appropriate advancement flap was drawn incorporating the defect and placing the expected incisions within the relaxed skin tension lines where possible.    The area thus outlined was incised deep to adipose tissue with a #15 scalpel blade.  The skin margins were undermined to an appropriate distance in all directions utilizing iris scissors.
Suture Removal: 14 days
Double M-Plasty Intermediate Repair Preamble Text (Leave Blank If You Do Not Want): Undermining was performed with blunt dissection.
Complex Repair And Single Advancement Flap Text: The defect edges were debeveled with a #15 scalpel blade.  The primary defect was closed partially with a complex linear closure.  Given the location of the remaining defect, shape of the defect and the proximity to free margins a single advancement flap was deemed most appropriate for complete closure of the defect.  Using a sterile surgical marker, an appropriate advancement flap was drawn incorporating the defect and placing the expected incisions within the relaxed skin tension lines where possible.    The area thus outlined was incised deep to adipose tissue with a #15 scalpel blade.  The skin margins were undermined to an appropriate distance in all directions utilizing iris scissors.
Size Of Lesion In Cm: 2
Island Pedicle Flap With Canthal Suspension Text: The defect edges were debeveled with a #15 scalpel blade.  Given the location of the defect, shape of the defect and the proximity to free margins an island pedicle advancement flap was deemed most appropriate.  Using a sterile surgical marker, an appropriate advancement flap was drawn incorporating the defect, outlining the appropriate donor tissue and placing the expected incisions within the relaxed skin tension lines where possible. The area thus outlined was incised deep to adipose tissue with a #15 scalpel blade.  The skin margins were undermined to an appropriate distance in all directions around the primary defect and laterally outward around the island pedicle utilizing iris scissors.  There was minimal undermining beneath the pedicle flap. A suspension suture was placed in the canthal tendon to prevent tension and prevent ectropion.
Cartilage Graft Text: The defect edges were debeveled with a #15 scalpel blade.  Given the location of the defect, shape of the defect, the fact the defect involved a full thickness cartilage defect a cartilage graft was deemed most appropriate.  An appropriate donor site was identified, cleansed, and anesthetized. The cartilage graft was then harvested and transferred to the recipient site, oriented appropriately and then sutured into place.  The secondary defect was then repaired using a primary closure.
Post-Care Instructions: I reviewed with the patient in detail post-care instructions. Patient is not to engage in any heavy lifting, exercise, or swimming for the next 14 days. Should the patient develop any fevers, chills, bleeding, severe pain patient will contact the office immediately.
V-Y Plasty Text: The defect edges were debeveled with a #15 scalpel blade.  Given the location of the defect, shape of the defect and the proximity to free margins an V-Y advancement flap was deemed most appropriate.  Using a sterile surgical marker, an appropriate advancement flap was drawn incorporating the defect and placing the expected incisions within the relaxed skin tension lines where possible.    The area thus outlined was incised deep to adipose tissue with a #15 scalpel blade.  The skin margins were undermined to an appropriate distance in all directions utilizing iris scissors.
Complex Repair And Transposition Flap Text: The defect edges were debeveled with a #15 scalpel blade.  The primary defect was closed partially with a complex linear closure.  Given the location of the remaining defect, shape of the defect and the proximity to free margins a transposition flap was deemed most appropriate for complete closure of the defect.  Using a sterile surgical marker, an appropriate advancement flap was drawn incorporating the defect and placing the expected incisions within the relaxed skin tension lines where possible.    The area thus outlined was incised deep to adipose tissue with a #15 scalpel blade.  The skin margins were undermined to an appropriate distance in all directions utilizing iris scissors.
Nasal Turnover Hinge Flap Text: The defect edges were debeveled with a #15 scalpel blade.  Given the size, depth, location of the defect and the defect being full thickness a nasal turnover hinge flap was deemed most appropriate.  Using a sterile surgical marker, an appropriate hinge flap was drawn incorporating the defect. The area thus outlined was incised with a #15 scalpel blade. The flap was designed to recreate the nasal mucosal lining and the alar rim. The skin margins were undermined to an appropriate distance in all directions utilizing iris scissors.
Complex Repair And Ftsg Text: The defect edges were debeveled with a #15 scalpel blade.  The primary defect was closed partially with a complex linear closure.  Given the location of the defect, shape of the defect and the proximity to free margins a full thickness skin graft was deemed most appropriate to repair the remaining defect.  The graft was trimmed to fit the size of the remaining defect.  The graft was then placed in the primary defect, oriented appropriately, and sutured into place.
Interpolation Flap Text: A decision was made to reconstruct the defect utilizing an interpolation axial flap and a staged reconstruction.  A telfa template was made of the defect.  This telfa template was then used to outline the interpolation flap.  The donor area for the pedicle flap was then injected with anesthesia.  The flap was excised through the skin and subcutaneous tissue down to the layer of the underlying musculature.  The interpolation flap was carefully excised within this deep plane to maintain its blood supply.  The edges of the donor site were undermined.   The donor site was closed in a primary fashion.  The pedicle was then rotated into position and sutured.  Once the tube was sutured into place, adequate blood supply was confirmed with blanching and refill.  The pedicle was then wrapped with xeroform gauze and dressed appropriately with a telfa and gauze bandage to ensure continued blood supply and protect the attached pedicle.
Detail Level: Detailed
Chonodrocutaneous Helical Advancement Flap Text: The defect edges were debeveled with a #15 scalpel blade.  Given the location of the defect and the proximity to free margins a chondrocutaneous helical advancement flap was deemed most appropriate.  Using a sterile surgical marker, the appropriate advancement flap was drawn incorporating the defect and placing the expected incisions within the relaxed skin tension lines where possible.    The area thus outlined was incised deep to adipose tissue with a #15 scalpel blade.  The skin margins were undermined to an appropriate distance in all directions utilizing iris scissors.
Complex Repair And Burow's Graft Text: The defect edges were debeveled with a #15 scalpel blade.  The primary defect was closed partially with a complex linear closure.  Given the location of the defect, shape of the defect, the proximity to free margins and the presence of a standing cone deformity a Burow's graft was deemed most appropriate to repair the remaining defect.  The graft was trimmed to fit the size of the remaining defect.  The graft was then placed in the primary defect, oriented appropriately, and sutured into place.
Hemostasis: Pressure
Melolabial Interpolation Flap Text: A decision was made to reconstruct the defect utilizing an interpolation axial flap and a staged reconstruction.  A telfa template was made of the defect.  This telfa template was then used to outline the melolabial interpolation flap.  The donor area for the pedicle flap was then injected with anesthesia.  The flap was excised through the skin and subcutaneous tissue down to the layer of the underlying musculature.  The pedicle flap was carefully excised within this deep plane to maintain its blood supply.  The edges of the donor site were undermined.   The donor site was closed in a primary fashion.  The pedicle was then rotated into position and sutured.  Once the tube was sutured into place, adequate blood supply was confirmed with blanching and refill.  The pedicle was then wrapped with xeroform gauze and dressed appropriately with a telfa and gauze bandage to ensure continued blood supply and protect the attached pedicle.
Intermediate / Complex Repair - Final Wound Length In Cm: 1.8
Scalpel Size: 15 blade
Mercedes Flap Text: The defect edges were debeveled with a #15 scalpel blade.  Given the location of the defect, shape of the defect and the proximity to free margins a Mercedes flap was deemed most appropriate.  Using a sterile surgical marker, an appropriate advancement flap was drawn incorporating the defect and placing the expected incisions within the relaxed skin tension lines where possible. The area thus outlined was incised deep to adipose tissue with a #15 scalpel blade.  The skin margins were undermined to an appropriate distance in all directions utilizing iris scissors.
Mustarde Flap Text: The defect edges were debeveled with a #15 scalpel blade.  Given the size, depth and location of the defect and the proximity to free margins a Mustarde flap was deemed most appropriate.  Using a sterile surgical marker, an appropriate flap was drawn incorporating the defect. The area thus outlined was incised with a #15 scalpel blade.  The skin margins were undermined to an appropriate distance in all directions utilizing iris scissors.
A-T Advancement Flap Text: The defect edges were debeveled with a #15 scalpel blade.  Given the location of the defect, shape of the defect and the proximity to free margins an A-T advancement flap was deemed most appropriate.  Using a sterile surgical marker, an appropriate advancement flap was drawn incorporating the defect and placing the expected incisions within the relaxed skin tension lines where possible.    The area thus outlined was incised deep to adipose tissue with a #15 scalpel blade.  The skin margins were undermined to an appropriate distance in all directions utilizing iris scissors.
Graft Donor Site Bandage (Optional-Leave Blank If You Don't Want In Note): Steri-strips and a pressure bandage were applied to the donor site.
Star Wedge Flap Text: The defect edges were debeveled with a #15 scalpel blade.  Given the location of the defect, shape of the defect and the proximity to free margins a star wedge flap was deemed most appropriate.  Using a sterile surgical marker, an appropriate rotation flap was drawn incorporating the defect and placing the expected incisions within the relaxed skin tension lines where possible. The area thus outlined was incised deep to adipose tissue with a #15 scalpel blade.  The skin margins were undermined to an appropriate distance in all directions utilizing iris scissors.
Double O-Z Plasty Text: The defect edges were debeveled with a #15 scalpel blade.  Given the location of the defect, shape of the defect and the proximity to free margins a Double O-Z plasty (double transposition flap) was deemed most appropriate.  Using a sterile surgical marker, the appropriate transposition flaps were drawn incorporating the defect and placing the expected incisions within the relaxed skin tension lines where possible. The area thus outlined was incised deep to adipose tissue with a #15 scalpel blade.  The skin margins were undermined to an appropriate distance in all directions utilizing iris scissors.  Hemostasis was achieved with electrocautery.  The flaps were then transposed into place, one clockwise and the other counterclockwise, and anchored with interrupted buried subcutaneous sutures.
Burow's Graft Text: The defect edges were debeveled with a #15 scalpel blade.  Given the location of the defect, shape of the defect, the proximity to free margins and the presence of a standing cone deformity a Burow's skin graft was deemed most appropriate. The standing cone was removed and this tissue was then trimmed to the shape of the primary defect. The adipose tissue was also removed until only dermis and epidermis were left.  The skin margins of the secondary defect were undermined to an appropriate distance in all directions utilizing iris scissors.  The secondary defect was closed with interrupted buried subcutaneous sutures.  The skin edges were then re-apposed with running  sutures.  The skin graft was then placed in the primary defect and oriented appropriately.
Medical Necessity Clause: This procedure was medically necessary because the lesion that was treated was:
Complex Repair And Bilobe Flap Text: The defect edges were debeveled with a #15 scalpel blade.  The primary defect was closed partially with a complex linear closure.  Given the location of the remaining defect, shape of the defect and the proximity to free margins a bilobe flap was deemed most appropriate for complete closure of the defect.  Using a sterile surgical marker, an appropriate advancement flap was drawn incorporating the defect and placing the expected incisions within the relaxed skin tension lines where possible.    The area thus outlined was incised deep to adipose tissue with a #15 scalpel blade.  The skin margins were undermined to an appropriate distance in all directions utilizing iris scissors.
O-T Advancement Flap Text: The defect edges were debeveled with a #15 scalpel blade.  Given the location of the defect, shape of the defect and the proximity to free margins an O-T advancement flap was deemed most appropriate.  Using a sterile surgical marker, an appropriate advancement flap was drawn incorporating the defect and placing the expected incisions within the relaxed skin tension lines where possible.    The area thus outlined was incised deep to adipose tissue with a #15 scalpel blade.  The skin margins were undermined to an appropriate distance in all directions utilizing iris scissors.
Cheek-To-Nose Interpolation Flap Text: A decision was made to reconstruct the defect utilizing an interpolation axial flap and a staged reconstruction.  A telfa template was made of the defect.  This telfa template was then used to outline the Cheek-To-Nose Interpolation flap.  The donor area for the pedicle flap was then injected with anesthesia.  The flap was excised through the skin and subcutaneous tissue down to the layer of the underlying musculature.  The interpolation flap was carefully excised within this deep plane to maintain its blood supply.  The edges of the donor site were undermined.   The donor site was closed in a primary fashion.  The pedicle was then rotated into position and sutured.  Once the tube was sutured into place, adequate blood supply was confirmed with blanching and refill.  The pedicle was then wrapped with xeroform gauze and dressed appropriately with a telfa and gauze bandage to ensure continued blood supply and protect the attached pedicle.
Orbicularis Oris Muscle Flap Text: The defect edges were debeveled with a #15 scalpel blade.  Given that the defect affected the competency of the oral sphincter an orbicularis oris muscle flap was deemed most appropriate to restore this competency and normal muscle function.  Using a sterile surgical marker, an appropriate flap was drawn incorporating the defect. The area thus outlined was incised with a #15 scalpel blade.
V-Y Flap Text: The defect edges were debeveled with a #15 scalpel blade.  Given the location of the defect, shape of the defect and the proximity to free margins a V-Y flap was deemed most appropriate.  Using a sterile surgical marker, an appropriate advancement flap was drawn incorporating the defect and placing the expected incisions within the relaxed skin tension lines where possible.    The area thus outlined was incised deep to adipose tissue with a #15 scalpel blade.  The skin margins were undermined to an appropriate distance in all directions utilizing iris scissors.
Dermal Closure: simple interrupted
Complex Repair And Dorsal Nasal Flap Text: The defect edges were debeveled with a #15 scalpel blade.  The primary defect was closed partially with a complex linear closure.  Given the location of the remaining defect, shape of the defect and the proximity to free margins a dorsal nasal flap was deemed most appropriate for complete closure of the defect.  Using a sterile surgical marker, an appropriate flap was drawn incorporating the defect and placing the expected incisions within the relaxed skin tension lines where possible.    The area thus outlined was incised deep to adipose tissue with a #15 scalpel blade.  The skin margins were undermined to an appropriate distance in all directions utilizing iris scissors.
Bilateral Helical Rim Advancement Flap Text: The defect edges were debeveled with a #15 blade scalpel.  Given the location of the defect and the proximity to free margins (helical rim) a bilateral helical rim advancement flap was deemed most appropriate.  Using a sterile surgical marker, the appropriate advancement flaps were drawn incorporating the defect and placing the expected incisions between the helical rim and antihelix where possible.  The area thus outlined was incised through and through with a #15 scalpel blade.  With a skin hook and iris scissors, the flaps were gently and sharply undermined and freed up.
Deep Sutures: 3-0 Vicryl
Complex Repair And Melolabial Flap Text: The defect edges were debeveled with a #15 scalpel blade.  The primary defect was closed partially with a complex linear closure.  Given the location of the remaining defect, shape of the defect and the proximity to free margins a melolabial flap was deemed most appropriate for complete closure of the defect.  Using a sterile surgical marker, an appropriate advancement flap was drawn incorporating the defect and placing the expected incisions within the relaxed skin tension lines where possible.    The area thus outlined was incised deep to adipose tissue with a #15 scalpel blade.  The skin margins were undermined to an appropriate distance in all directions utilizing iris scissors.
Melolabial Transposition Flap Text: The defect edges were debeveled with a #15 scalpel blade.  Given the location of the defect and the proximity to free margins a melolabial flap was deemed most appropriate.  Using a sterile surgical marker, an appropriate melolabial transposition flap was drawn incorporating the defect.    The area thus outlined was incised deep to adipose tissue with a #15 scalpel blade.  The skin margins were undermined to an appropriate distance in all directions utilizing iris scissors.
Complex Repair And Epidermal Autograft Text: The defect edges were debeveled with a #15 scalpel blade.  The primary defect was closed partially with a complex linear closure.  Given the location of the defect, shape of the defect and the proximity to free margins an epidermal autograft was deemed most appropriate to repair the remaining defect.  The graft was trimmed to fit the size of the remaining defect.  The graft was then placed in the primary defect, oriented appropriately, and sutured into place.
Repair Type: Intermediate
Helical Rim Text: The closure involved the helical rim.
Staged Advancement Flap Text: The defect edges were debeveled with a #15 scalpel blade.  Given the location of the defect, shape of the defect and the proximity to free margins a staged advancement flap was deemed most appropriate.  Using a sterile surgical marker, an appropriate advancement flap was drawn incorporating the defect and placing the expected incisions within the relaxed skin tension lines where possible. The area thus outlined was incised deep to adipose tissue with a #15 scalpel blade.  The skin margins were undermined to an appropriate distance in all directions utilizing iris scissors.
Complex Repair And Rotation Flap Text: The defect edges were debeveled with a #15 scalpel blade.  The primary defect was closed partially with a complex linear closure.  Given the location of the remaining defect, shape of the defect and the proximity to free margins a rotation flap was deemed most appropriate for complete closure of the defect.  Using a sterile surgical marker, an appropriate advancement flap was drawn incorporating the defect and placing the expected incisions within the relaxed skin tension lines where possible.    The area thus outlined was incised deep to adipose tissue with a #15 scalpel blade.  The skin margins were undermined to an appropriate distance in all directions utilizing iris scissors.
Purse String (Simple) Text: Given the location of the defect and the characteristics of the surrounding skin a purse string simple closure was deemed most appropriate.  Undermining was performed circumferentially around the surgical defect.  A purse string suture was then placed and tightened.
Consent was obtained from the patient. The risks and benefits to therapy were discussed in detail. Specifically, the risks of infection, scarring, bleeding, prolonged wound healing, incomplete removal, allergy to anesthesia, nerve injury and recurrence were addressed. Prior to the procedure, the treatment site was clearly identified and confirmed by the patient. All components of Universal Protocol/PAUSE Rule completed.
Anesthesia Type: 1% lidocaine with epinephrine
Complex Repair And Z Plasty Text: The defect edges were debeveled with a #15 scalpel blade.  The primary defect was closed partially with a complex linear closure.  Given the location of the remaining defect, shape of the defect and the proximity to free margins a Z plasty was deemed most appropriate for complete closure of the defect.  Using a sterile surgical marker, an appropriate advancement flap was drawn incorporating the defect and placing the expected incisions within the relaxed skin tension lines where possible.    The area thus outlined was incised deep to adipose tissue with a #15 scalpel blade.  The skin margins were undermined to an appropriate distance in all directions utilizing iris scissors.
Cheek Interpolation Flap Text: A decision was made to reconstruct the defect utilizing an interpolation axial flap and a staged reconstruction.  A telfa template was made of the defect.  This telfa template was then used to outline the Cheek Interpolation flap.  The donor area for the pedicle flap was then injected with anesthesia.  The flap was excised through the skin and subcutaneous tissue down to the layer of the underlying musculature.  The interpolation flap was carefully excised within this deep plane to maintain its blood supply.  The edges of the donor site were undermined.   The donor site was closed in a primary fashion.  The pedicle was then rotated into position and sutured.  Once the tube was sutured into place, adequate blood supply was confirmed with blanching and refill.  The pedicle was then wrapped with xeroform gauze and dressed appropriately with a telfa and gauze bandage to ensure continued blood supply and protect the attached pedicle.
Double O-Z Flap Text: The defect edges were debeveled with a #15 scalpel blade.  Given the location of the defect, shape of the defect and the proximity to free margins a Double O-Z flap was deemed most appropriate.  Using a sterile surgical marker, an appropriate transposition flap was drawn incorporating the defect and placing the expected incisions within the relaxed skin tension lines where possible. The area thus outlined was incised deep to adipose tissue with a #15 scalpel blade.  The skin margins were undermined to an appropriate distance in all directions utilizing iris scissors.
Helical Rim Advancement Flap Text: The defect edges were debeveled with a #15 blade scalpel.  Given the location of the defect and the proximity to free margins (helical rim) a double helical rim advancement flap was deemed most appropriate.  Using a sterile surgical marker, the appropriate advancement flaps were drawn incorporating the defect and placing the expected incisions between the helical rim and antihelix where possible.  The area thus outlined was incised through and through with a #15 scalpel blade.  With a skin hook and iris scissors, the flaps were gently and sharply undermined and freed up.
Dorsal Nasal Flap Text: The defect edges were debeveled with a #15 scalpel blade.  Given the location of the defect and the proximity to free margins a dorsal nasal flap was deemed most appropriate.  Using a sterile surgical marker, an appropriate dorsal nasal flap was drawn around the defect.    The area thus outlined was incised deep to adipose tissue with a #15 scalpel blade.  The skin margins were undermined to an appropriate distance in all directions utilizing iris scissors.
Perilesional Excision Additional Text (Leave Blank If You Do Not Want): The margin was drawn around the clinically apparent lesion. Incisions were then made along these lines to the appropriate tissue plane and the lesion was extirpated.
Burow's Advancement Flap Text: The defect edges were debeveled with a #15 scalpel blade.  Given the location of the defect and the proximity to free margins a Burow's advancement flap was deemed most appropriate.  Using a sterile surgical marker, the appropriate advancement flap was drawn incorporating the defect and placing the expected incisions within the relaxed skin tension lines where possible.    The area thus outlined was incised deep to adipose tissue with a #15 scalpel blade.  The skin margins were undermined to an appropriate distance in all directions utilizing iris scissors.
Spiral Flap Text: The defect edges were debeveled with a #15 scalpel blade.  Given the location of the defect, shape of the defect and the proximity to free margins a spiral flap was deemed most appropriate.  Using a sterile surgical marker, an appropriate rotation flap was drawn incorporating the defect and placing the expected incisions within the relaxed skin tension lines where possible. The area thus outlined was incised deep to adipose tissue with a #15 scalpel blade.  The skin margins were undermined to an appropriate distance in all directions utilizing iris scissors.
Purse String (Intermediate) Text: Given the location of the defect and the characteristics of the surrounding skin a purse string intermediate closure was deemed most appropriate.  Undermining was performed circumferentially around the surgical defect.  A purse string suture was then placed and tightened.
Billing Type: Third-Party Bill
Dressing: steri-strips and pressure dressing
Complex Repair And O-L Flap Text: The defect edges were debeveled with a #15 scalpel blade.  The primary defect was closed partially with a complex linear closure.  Given the location of the remaining defect, shape of the defect and the proximity to free margins an O-L flap was deemed most appropriate for complete closure of the defect.  Using a sterile surgical marker, an appropriate flap was drawn incorporating the defect and placing the expected incisions within the relaxed skin tension lines where possible.    The area thus outlined was incised deep to adipose tissue with a #15 scalpel blade.  The skin margins were undermined to an appropriate distance in all directions utilizing iris scissors.
Epidermal Closure: running subcuticular
O-Z Plasty Text: The defect edges were debeveled with a #15 scalpel blade.  Given the location of the defect, shape of the defect and the proximity to free margins an O-Z plasty (double transposition flap) was deemed most appropriate.  Using a sterile surgical marker, the appropriate transposition flaps were drawn incorporating the defect and placing the expected incisions within the relaxed skin tension lines where possible.    The area thus outlined was incised deep to adipose tissue with a #15 scalpel blade.  The skin margins were undermined to an appropriate distance in all directions utilizing iris scissors.  Hemostasis was achieved with electrocautery.  The flaps were then transposed into place, one clockwise and the other counterclockwise, and anchored with interrupted buried subcutaneous sutures.
Hemigard Postcare Instructions: The HEMIGARD strips are to remain completely dry for at least 5-7 days.
Complex Repair And Rhombic Flap Text: The defect edges were debeveled with a #15 scalpel blade.  The primary defect was closed partially with a complex linear closure.  Given the location of the remaining defect, shape of the defect and the proximity to free margins a rhombic flap was deemed most appropriate for complete closure of the defect.  Using a sterile surgical marker, an appropriate advancement flap was drawn incorporating the defect and placing the expected incisions within the relaxed skin tension lines where possible.    The area thus outlined was incised deep to adipose tissue with a #15 scalpel blade.  The skin margins were undermined to an appropriate distance in all directions utilizing iris scissors.
Complex Repair And Dermal Autograft Text: The defect edges were debeveled with a #15 scalpel blade.  The primary defect was closed partially with a complex linear closure.  Given the location of the defect, shape of the defect and the proximity to free margins an dermal autograft was deemed most appropriate to repair the remaining defect.  The graft was trimmed to fit the size of the remaining defect.  The graft was then placed in the primary defect, oriented appropriately, and sutured into place.
Split-Thickness Skin Graft Text: The defect edges were debeveled with a #15 scalpel blade.  Given the location of the defect, shape of the defect and the proximity to free margins a split thickness skin graft was deemed most appropriate.  Using a sterile surgical marker, the primary defect shape was transferred to the donor site. The split thickness graft was then harvested.  The skin graft was then placed in the primary defect and oriented appropriately.
Excisional Biopsy Additional Text (Leave Blank If You Do Not Want): The margin was drawn around the clinically apparent lesion. An elliptical shape was then drawn on the skin incorporating the lesion and margins.  Incisions were then made along these lines to the appropriate tissue plane and the lesion was extirpated.
Mastoid Interpolation Flap Text: A decision was made to reconstruct the defect utilizing an interpolation axial flap and a staged reconstruction.  A telfa template was made of the defect.  This telfa template was then used to outline the mastoid interpolation flap.  The donor area for the pedicle flap was then injected with anesthesia.  The flap was excised through the skin and subcutaneous tissue down to the layer of the underlying musculature.  The pedicle flap was carefully excised within this deep plane to maintain its blood supply.  The edges of the donor site were undermined.   The donor site was closed in a primary fashion.  The pedicle was then rotated into position and sutured.  Once the tube was sutured into place, adequate blood supply was confirmed with blanching and refill.  The pedicle was then wrapped with xeroform gauze and dressed appropriately with a telfa and gauze bandage to ensure continued blood supply and protect the attached pedicle.
Advancement Flap (Double) Text: The defect edges were debeveled with a #15 scalpel blade.  Given the location of the defect and the proximity to free margins a double advancement flap was deemed most appropriate.  Using a sterile surgical marker, the appropriate advancement flaps were drawn incorporating the defect and placing the expected incisions within the relaxed skin tension lines where possible.    The area thus outlined was incised deep to adipose tissue with a #15 scalpel blade.  The skin margins were undermined to an appropriate distance in all directions utilizing iris scissors.
Bi-Rhombic Flap Text: The defect edges were debeveled with a #15 scalpel blade.  Given the location of the defect and the proximity to free margins a bi-rhombic flap was deemed most appropriate.  Using a sterile surgical marker, an appropriate rhombic flap was drawn incorporating the defect. The area thus outlined was incised deep to adipose tissue with a #15 scalpel blade.  The skin margins were undermined to an appropriate distance in all directions utilizing iris scissors.
Trilobed Flap Text: The defect edges were debeveled with a #15 scalpel blade.  Given the location of the defect and the proximity to free margins a trilobed flap was deemed most appropriate.  Using a sterile surgical marker, an appropriate trilobed flap drawn around the defect.    The area thus outlined was incised deep to adipose tissue with a #15 scalpel blade.  The skin margins were undermined to an appropriate distance in all directions utilizing iris scissors.
Fusiform Excision Additional Text (Leave Blank If You Do Not Want): The margin was drawn around the clinically apparent lesion.  A fusiform shape was then drawn on the skin incorporating the lesion and margins.  Incisions were then made along these lines to the appropriate tissue plane and the lesion was extirpated.
Ftsg Text: The defect edges were debeveled with a #15 scalpel blade.  Given the location of the defect, shape of the defect and the proximity to free margins a full thickness skin graft was deemed most appropriate.  Using a sterile surgical marker, the primary defect shape was transferred to the donor site. The area thus outlined was incised deep to adipose tissue with a #15 scalpel blade.  The harvested graft was then trimmed of adipose tissue until only dermis and epidermis was left.  The skin margins of the secondary defect were undermined to an appropriate distance in all directions utilizing iris scissors.  The secondary defect was closed with interrupted buried subcutaneous sutures.  The skin edges were then re-apposed with running  sutures.  The skin graft was then placed in the primary defect and oriented appropriately.
Complex Repair And O-T Advancement Flap Text: The defect edges were debeveled with a #15 scalpel blade.  The primary defect was closed partially with a complex linear closure.  Given the location of the remaining defect, shape of the defect and the proximity to free margins an O-T advancement flap was deemed most appropriate for complete closure of the defect.  Using a sterile surgical marker, an appropriate advancement flap was drawn incorporating the defect and placing the expected incisions within the relaxed skin tension lines where possible.    The area thus outlined was incised deep to adipose tissue with a #15 scalpel blade.  The skin margins were undermined to an appropriate distance in all directions utilizing iris scissors.
Skin Substitute Text: The defect edges were debeveled with a #15 scalpel blade.  Given the location of the defect, shape of the defect and the proximity to free margins a skin substitute graft was deemed most appropriate.  The graft material was trimmed to fit the size of the defect. The graft was then placed in the primary defect and oriented appropriately.
O-T Plasty Text: The defect edges were debeveled with a #15 scalpel blade.  Given the location of the defect, shape of the defect and the proximity to free margins an O-T plasty was deemed most appropriate.  Using a sterile surgical marker, an appropriate O-T plasty was drawn incorporating the defect and placing the expected incisions within the relaxed skin tension lines where possible.    The area thus outlined was incised deep to adipose tissue with a #15 scalpel blade.  The skin margins were undermined to an appropriate distance in all directions utilizing iris scissors.
Estimated Blood Loss (Cc): minimal
Debridement Text: The wound edges were debrided prior to proceeding with the closure to facilitate wound healing.
Complex Repair And W Plasty Text: The defect edges were debeveled with a #15 scalpel blade.  The primary defect was closed partially with a complex linear closure.  Given the location of the remaining defect, shape of the defect and the proximity to free margins a W plasty was deemed most appropriate for complete closure of the defect.  Using a sterile surgical marker, an appropriate advancement flap was drawn incorporating the defect and placing the expected incisions within the relaxed skin tension lines where possible.    The area thus outlined was incised deep to adipose tissue with a #15 scalpel blade.  The skin margins were undermined to an appropriate distance in all directions utilizing iris scissors.
Zygomaticofacial Flap Text: Given the location of the defect, shape of the defect and the proximity to free margins a zygomaticofacial flap was deemed most appropriate for repair.  Using a sterile surgical marker, the appropriate flap was drawn incorporating the defect and placing the expected incisions within the relaxed skin tension lines where possible. The area thus outlined was incised deep to adipose tissue with a #15 scalpel blade with preservation of a vascular pedicle.  The skin margins were undermined to an appropriate distance in all directions utilizing iris scissors.  The flap was then placed into the defect and anchored with interrupted buried subcutaneous sutures.
Excision Method: Slit
Tissue Cultured Epidermal Autograft Text: The defect edges were debeveled with a #15 scalpel blade.  Given the location of the defect, shape of the defect and the proximity to free margins a tissue cultured epidermal autograft was deemed most appropriate.  The graft was then trimmed to fit the size of the defect.  The graft was then placed in the primary defect and oriented appropriately.
Advancement Flap (Single) Text: The defect edges were debeveled with a #15 scalpel blade.  Given the location of the defect and the proximity to free margins a single advancement flap was deemed most appropriate.  Using a sterile surgical marker, an appropriate advancement flap was drawn incorporating the defect and placing the expected incisions within the relaxed skin tension lines where possible.    The area thus outlined was incised deep to adipose tissue with a #15 scalpel blade.  The skin margins were undermined to an appropriate distance in all directions utilizing iris scissors.
Alar Island Pedicle Flap Text: The defect edges were debeveled with a #15 scalpel blade.  Given the location of the defect, shape of the defect and the proximity to the alar rim an island pedicle advancement flap was deemed most appropriate.  Using a sterile surgical marker, an appropriate advancement flap was drawn incorporating the defect, outlining the appropriate donor tissue and placing the expected incisions within the nasal ala running parallel to the alar rim. The area thus outlined was incised with a #15 scalpel blade.  The skin margins were undermined minimally to an appropriate distance in all directions around the primary defect and laterally outward around the island pedicle utilizing iris scissors.  There was minimal undermining beneath the pedicle flap.
Rhomboid Transposition Flap Text: The defect edges were debeveled with a #15 scalpel blade.  Given the location of the defect and the proximity to free margins a rhomboid transposition flap was deemed most appropriate.  Using a sterile surgical marker, an appropriate rhomboid flap was drawn incorporating the defect.    The area thus outlined was incised deep to adipose tissue with a #15 scalpel blade.  The skin margins were undermined to an appropriate distance in all directions utilizing iris scissors.
Bilobed Transposition Flap Text: The defect edges were debeveled with a #15 scalpel blade.  Given the location of the defect and the proximity to free margins a bilobed transposition flap was deemed most appropriate.  Using a sterile surgical marker, an appropriate bilobe flap drawn around the defect.    The area thus outlined was incised deep to adipose tissue with a #15 scalpel blade.  The skin margins were undermined to an appropriate distance in all directions utilizing iris scissors.
Home Suture Removal Text: Patient was provided a home suture removal kit and will remove their sutures at home.  If they have any questions or difficulties they will call the office.
Where Do You Want The Question To Include Opioid Counseling Located?: Case Summary Tab
Rotation Flap Text: The defect edges were debeveled with a #15 scalpel blade.  Given the location of the defect, shape of the defect and the proximity to free margins a rotation flap was deemed most appropriate.  Using a sterile surgical marker, an appropriate rotation flap was drawn incorporating the defect and placing the expected incisions within the relaxed skin tension lines where possible.    The area thus outlined was incised deep to adipose tissue with a #15 scalpel blade.  The skin margins were undermined to an appropriate distance in all directions utilizing iris scissors.
Keystone Flap Text: The defect edges were debeveled with a #15 scalpel blade.  Given the location of the defect, shape of the defect a keystone flap was deemed most appropriate.  Using a sterile surgical marker, an appropriate keystone flap was drawn incorporating the defect, outlining the appropriate donor tissue and placing the expected incisions within the relaxed skin tension lines where possible. The area thus outlined was incised deep to adipose tissue with a #15 scalpel blade.  The skin margins were undermined to an appropriate distance in all directions around the primary defect and laterally outward around the flap utilizing iris scissors.
Lip Wedge Excision Repair Text: Given the location of the defect and the proximity to free margins a full thickness wedge repair was deemed most appropriate.  Using a sterile surgical marker, the appropriate repair was drawn incorporating the defect and placing the expected incisions perpendicular to the vermilion border.  The vermilion border was also meticulously outlined to ensure appropriate reapproximation during the repair.  The area thus outlined was incised through and through with a #15 scalpel blade.  The muscularis and dermis were reaproximated with deep sutures following hemostasis. Care was taken to realign the vermilion border before proceeding with the superficial closure.  Once the vermilion was realigned the superfical and mucosal closure was finished.
Information: Selecting Yes will display possible errors in your note based on the variables you have selected. This validation is only offered as a suggestion for you. PLEASE NOTE THAT THE VALIDATION TEXT WILL BE REMOVED WHEN YOU FINALIZE YOUR NOTE. IF YOU WANT TO FAX A PRELIMINARY NOTE YOU WILL NEED TO TOGGLE THIS TO 'NO' IF YOU DO NOT WANT IT IN YOUR FAXED NOTE.
Complex Repair And A-T Advancement Flap Text: The defect edges were debeveled with a #15 scalpel blade.  The primary defect was closed partially with a complex linear closure.  Given the location of the remaining defect, shape of the defect and the proximity to free margins an A-T advancement flap was deemed most appropriate for complete closure of the defect.  Using a sterile surgical marker, an appropriate advancement flap was drawn incorporating the defect and placing the expected incisions within the relaxed skin tension lines where possible.    The area thus outlined was incised deep to adipose tissue with a #15 scalpel blade.  The skin margins were undermined to an appropriate distance in all directions utilizing iris scissors.
Anesthesia Volume In Cc: 3
Dermal Autograft Text: The defect edges were debeveled with a #15 scalpel blade.  Given the location of the defect, shape of the defect and the proximity to free margins a dermal autograft was deemed most appropriate.  Using a sterile surgical marker, the primary defect shape was transferred to the donor site. The area thus outlined was incised deep to adipose tissue with a #15 scalpel blade.  The harvested graft was then trimmed of adipose and epidermal tissue until only dermis was left.  The skin graft was then placed in the primary defect and oriented appropriately.
Complex Repair And Double M Plasty Text: The defect edges were debeveled with a #15 scalpel blade.  The primary defect was closed partially with a complex linear closure.  Given the location of the remaining defect, shape of the defect and the proximity to free margins a double M plasty was deemed most appropriate for complete closure of the defect.  Using a sterile surgical marker, an appropriate advancement flap was drawn incorporating the defect and placing the expected incisions within the relaxed skin tension lines where possible.    The area thus outlined was incised deep to adipose tissue with a #15 scalpel blade.  The skin margins were undermined to an appropriate distance in all directions utilizing iris scissors.
Z Plasty Text: The lesion was extirpated to the level of the fat with a #15 scalpel blade.  Given the location of the defect, shape of the defect and the proximity to free margins a Z-plasty was deemed most appropriate for repair.  Using a sterile surgical marker, the appropriate transposition arms of the Z-plasty were drawn incorporating the defect and placing the expected incisions within the relaxed skin tension lines where possible.    The area thus outlined was incised deep to adipose tissue with a #15 scalpel blade.  The skin margins were undermined to an appropriate distance in all directions utilizing iris scissors.  The opposing transposition arms were then transposed into place in opposite direction and anchored with interrupted buried subcutaneous sutures.
Slit Excision Additional Text (Leave Blank If You Do Not Want): A linear line was drawn on the skin overlying the lesion. An incision was made slowly until the lesion was visualized.  Once visualized, the lesion was removed with blunt dissection.
Nostril Rim Text: The closure involved the nostril rim.
Nasalis-Muscle-Based Myocutaneous Island Pedicle Flap Text: Using a #15 blade, an incision was made around the donor flap to the level of the nasalis muscle. Wide lateral undermining was then performed in both the subcutaneous plane above the nasalis muscle, and in a submuscular plane just above periosteum. This allowed the formation of a free nasalis muscle axial pedicle (based on the angular artery) which was still attached to the actual cutaneous flap, increasing its mobility and vascular viability. Hemostasis was obtained with pinpoint electrocoagulation. The flap was mobilized into position and the pivotal anchor points positioned and stabilized with buried interrupted sutures. Subcutaneous and dermal tissues were closed in a multilayered fashion with sutures. Tissue redundancies were excised, and the epidermal edges were apposed without significant tension and sutured with sutures.
Complex Repair And Tissue Cultured Epidermal Autograft Text: The defect edges were debeveled with a #15 scalpel blade.  The primary defect was closed partially with a complex linear closure.  Given the location of the defect, shape of the defect and the proximity to free margins an tissue cultured epidermal autograft was deemed most appropriate to repair the remaining defect.  The graft was trimmed to fit the size of the remaining defect.  The graft was then placed in the primary defect, oriented appropriately, and sutured into place.
Complex Repair And Split-Thickness Skin Graft Text: The defect edges were debeveled with a #15 scalpel blade.  The primary defect was closed partially with a complex linear closure.  Given the location of the defect, shape of the defect and the proximity to free margins a split thickness skin graft was deemed most appropriate to repair the remaining defect.  The graft was trimmed to fit the size of the remaining defect.  The graft was then placed in the primary defect, oriented appropriately, and sutured into place.
Excision Depth: adipose tissue
Hemigard Intro: Due to skin fragility and wound tension, it was decided to use HEMIGARD adhesive retention suture devices to permit a linear closure. The skin was cleaned and dried for a 6cm distance away from the wound. Excessive hair, if present, was removed to allow for adhesion.
Hatchet Flap Text: The defect edges were debeveled with a #15 scalpel blade.  Given the location of the defect, shape of the defect and the proximity to free margins a hatchet flap was deemed most appropriate.  Using a sterile surgical marker, an appropriate hatchet flap was drawn incorporating the defect and placing the expected incisions within the relaxed skin tension lines where possible.    The area thus outlined was incised deep to adipose tissue with a #15 scalpel blade.  The skin margins were undermined to an appropriate distance in all directions utilizing iris scissors.
Undermining Type: Entire Wound
Length To Time In Minutes Device Was In Place: 10
W Plasty Text: The lesion was extirpated to the level of the fat with a #15 scalpel blade.  Given the location of the defect, shape of the defect and the proximity to free margins a W-plasty was deemed most appropriate for repair.  Using a sterile surgical marker, the appropriate transposition arms of the W-plasty were drawn incorporating the defect and placing the expected incisions within the relaxed skin tension lines where possible.    The area thus outlined was incised deep to adipose tissue with a #15 scalpel blade.  The skin margins were undermined to an appropriate distance in all directions utilizing iris scissors.  The opposing transposition arms were then transposed into place in opposite direction and anchored with interrupted buried subcutaneous sutures.
Complex Repair And M Plasty Text: The defect edges were debeveled with a #15 scalpel blade.  The primary defect was closed partially with a complex linear closure.  Given the location of the remaining defect, shape of the defect and the proximity to free margins an M plasty was deemed most appropriate for complete closure of the defect.  Using a sterile surgical marker, an appropriate advancement flap was drawn incorporating the defect and placing the expected incisions within the relaxed skin tension lines where possible.    The area thus outlined was incised deep to adipose tissue with a #15 scalpel blade.  The skin margins were undermined to an appropriate distance in all directions utilizing iris scissors.
Epidermal Autograft Text: The defect edges were debeveled with a #15 scalpel blade.  Given the location of the defect, shape of the defect and the proximity to free margins an epidermal autograft was deemed most appropriate.  Using a sterile surgical marker, the primary defect shape was transferred to the donor site. The epidermal graft was then harvested.  The skin graft was then placed in the primary defect and oriented appropriately.
Complex Repair And Xenograft Text: The defect edges were debeveled with a #15 scalpel blade.  The primary defect was closed partially with a complex linear closure.  Given the location of the defect, shape of the defect and the proximity to free margins a xenograft was deemed most appropriate to repair the remaining defect.  The graft was trimmed to fit the size of the remaining defect.  The graft was then placed in the primary defect, oriented appropriately, and sutured into place.
Muscle Hinge Flap Text: The defect edges were debeveled with a #15 scalpel blade.  Given the size, depth and location of the defect and the proximity to free margins a muscle hinge flap was deemed most appropriate.  Using a sterile surgical marker, an appropriate hinge flap was drawn incorporating the defect. The area thus outlined was incised with a #15 scalpel blade.  The skin margins were undermined to an appropriate distance in all directions utilizing iris scissors.
Rhombic Flap Text: The defect edges were debeveled with a #15 scalpel blade.  Given the location of the defect and the proximity to free margins a rhombic flap was deemed most appropriate.  Using a sterile surgical marker, an appropriate rhombic flap was drawn incorporating the defect.    The area thus outlined was incised deep to adipose tissue with a #15 scalpel blade.  The skin margins were undermined to an appropriate distance in all directions utilizing iris scissors.
Bilobed Flap Text: The defect edges were debeveled with a #15 scalpel blade.  Given the location of the defect and the proximity to free margins a bilobe flap was deemed most appropriate.  Using a sterile surgical marker, an appropriate bilobe flap drawn around the defect.    The area thus outlined was incised deep to adipose tissue with a #15 scalpel blade.  The skin margins were undermined to an appropriate distance in all directions utilizing iris scissors.
Saucerization Excision Additional Text (Leave Blank If You Do Not Want): The margin was drawn around the clinically apparent lesion.  Incisions were then made along these lines, in a tangential fashion, to the appropriate tissue plane and the lesion was extirpated.
Vermilion Border Text: The closure involved the vermilion border.
Adjacent Tissue Transfer Text: The defect edges were debeveled with a #15 scalpel blade.  Given the location of the defect and the proximity to free margins an adjacent tissue transfer was deemed most appropriate.  Using a sterile surgical marker, an appropriate flap was drawn incorporating the defect and placing the expected incisions within the relaxed skin tension lines where possible.    The area thus outlined was incised deep to adipose tissue with a #15 scalpel blade.  The skin margins were undermined to an appropriate distance in all directions utilizing iris scissors.
Mucosal Advancement Flap Text: Given the location of the defect, shape of the defect and the proximity to free margins a mucosal advancement flap was deemed most appropriate. Incisions were made with a 15 blade scalpel in the appropriate fashion along the cutaneous vermillion border and the mucosal lip. The remaining actinically damaged mucosal tissue was excised.  The mucosal advancement flap was then elevated to the gingival sulcus with care taken to preserve the neurovascular structures and advanced into the primary defect. Care was taken to ensure that precise realignment of the vermilion border was achieved.
Suturegard Intro: Intraoperative tissue expansion was performed, utilizing the SUTUREGARD device, in order to reduce wound tension.
Transposition Flap Text: The defect edges were debeveled with a #15 scalpel blade.  Given the location of the defect and the proximity to free margins a transposition flap was deemed most appropriate.  Using a sterile surgical marker, an appropriate transposition flap was drawn incorporating the defect.    The area thus outlined was incised deep to adipose tissue with a #15 scalpel blade.  The skin margins were undermined to an appropriate distance in all directions utilizing iris scissors.
Complex Repair And Modified Advancement Flap Text: The defect edges were debeveled with a #15 scalpel blade.  The primary defect was closed partially with a complex linear closure.  Given the location of the remaining defect, shape of the defect and the proximity to free margins a modified advancement flap was deemed most appropriate for complete closure of the defect.  Using a sterile surgical marker, an appropriate advancement flap was drawn incorporating the defect and placing the expected incisions within the relaxed skin tension lines where possible.    The area thus outlined was incised deep to adipose tissue with a #15 scalpel blade.  The skin margins were undermined to an appropriate distance in all directions utilizing iris scissors.
Island Pedicle Flap-Requiring Vessel Identification Text: The defect edges were debeveled with a #15 scalpel blade.  Given the location of the defect, shape of the defect and the proximity to free margins an island pedicle advancement flap was deemed most appropriate.  Using a sterile surgical marker, an appropriate advancement flap was drawn, based on the axial vessel mentioned above, incorporating the defect, outlining the appropriate donor tissue and placing the expected incisions within the relaxed skin tension lines where possible.    The area thus outlined was incised deep to adipose tissue with a #15 scalpel blade.  The skin margins were undermined to an appropriate distance in all directions around the primary defect and laterally outward around the island pedicle utilizing iris scissors.  There was minimal undermining beneath the pedicle flap.
Number Of Hemigard Strips Per Side: 1
Paramedian Forehead Flap Text: A decision was made to reconstruct the defect utilizing an interpolation axial flap and a staged reconstruction.  A telfa template was made of the defect.  This telfa template was then used to outline the paramedian forehead pedicle flap.  The donor area for the pedicle flap was then injected with anesthesia.  The flap was excised through the skin and subcutaneous tissue down to the layer of the underlying musculature.  The pedicle flap was carefully excised within this deep plane to maintain its blood supply.  The edges of the donor site were undermined.   The donor site was closed in a primary fashion.  The pedicle was then rotated into position and sutured.  Once the tube was sutured into place, adequate blood supply was confirmed with blanching and refill.  The pedicle was then wrapped with xeroform gauze and dressed appropriately with a telfa and gauze bandage to ensure continued blood supply and protect the attached pedicle.

## 2022-07-01 DIAGNOSIS — I10 ESSENTIAL HYPERTENSION, BENIGN: ICD-10-CM

## 2022-07-03 RX ORDER — TRIAMTERENE/HYDROCHLOROTHIAZID 37.5-25 MG
TABLET ORAL
Qty: 90 TABLET | Refills: 0 | COMMUNITY
Start: 2022-07-03

## 2022-07-07 NOTE — PROGRESS NOTES
Assessment & Plan     Mixed hyperlipidemia    - VENOUS COLLECTION  - Comprehensive Metobolic Panel (BFP)  - Lipid Panel (BFP)    Asymptomatic stenosis of left carotid artery - US 9/21 recommended    - atorvastatin (LIPITOR) 80 MG tablet  Dispense: 90 tablet; Refill: 3  - VENOUS COLLECTION  - Comprehensive Metobolic Panel (BFP)  - Lipid Panel (BFP)    Mild persistent asthma without complication    - budesonide (PULMICORT FLEXHALER) 180 MCG/ACT inhaler  Dispense: 3 each; Refill: 3  - montelukast (SINGULAIR) 10 MG tablet  Dispense: 90 tablet; Refill: 3    Essential hypertension, benign    - metoprolol tartrate (LOPRESSOR) 25 MG tablet  Dispense: 180 tablet; Refill: 3  - triamterene-HCTZ (MAXZIDE-25) 37.5-25 MG tablet  Dispense: 90 tablet; Refill: 1  - VENOUS COLLECTION  - Comprehensive Metobolic Panel (BFP)    Gastroesophageal reflux disease, unspecified whether esophagitis present    - omeprazole (PRILOSEC) 20 MG DR capsule  Dispense: 90 capsule; Refill: 3    Cerebral ischemia- small vessel seen on MRI 2020    - VENOUS COLLECTION  - Comprehensive Metobolic Panel (BFP)  - Lipid Panel (BFP)    Elevated fasting glucose    - VENOUS COLLECTION  - Comprehensive Metobolic Panel (BFP)  - Lipid Panel (BFP)    Herpes simplex virus (HSV) infection      Migraine with aura and without status migrainosus, not intractable      Ocular migraine      HELLEN (obstructive sleep apnea)      Osteopenia of neck of left femur - repeat 2023    - VENOUS COLLECTION  - T4 FREE ([facility])  - TSH ([facility])  - VITAMIN D DEFICIENCY SCREENING ([facility])  - PTH, Intact and Calcium ([facility])  - Comprehensive Metobolic Panel (BFP)  - Magnesium ([facility])  - PHOSPHORUS ([facility])  - Lipid Panel (BFP)  - DX Hip/Pelvis/Spine  - Radiology Referral    Obesity (BMI 30.0-34.9)          FUTURE APPOINTMENTS:       - Follow-up visit in 6 months    Work on weight loss  Regular exercise      VINICIUS Lopez  [facility]    Subjective     Nursing Notes:    Lupe Cerna  7/8/2022  7:43 AM  Signed  Chief Complaint   Patient presents with     Recheck Medication     Fasting med check         Pre-visit Screening:  Immunizations:  up to date  Colonoscopy:  is up to date  Mammogram: is up to date  Asthma Action Test/Plan:  ALMA  PHQ9:  NA  GAD7:  NA  Questioned patient about current smoking habits Pt. has never smoked.  Ok to leave detailed message on voice mail for today's visit only Yes, phone # [phone]                  Priscilla Linda is a 71 year old female who presents to clinic today for the following health issues     HPI     Osteopenia labs  Today - repeat DEXA order    Migraines  -  A couple a month    HSV  - oral 8-10 outbreaks a year    Hyperlipidemia Follow-Up      Are you regularly taking any medication or supplement to lower your cholesterol?   Yes- Lipitor 80 mg    Are you having muscle aches or other side effects that you think could be caused by your cholesterol lowering medication?  No    Hypertension Follow-up      Do you check your blood pressure regularly outside of the clinic? No     Vascular Disease Follow-up      How often do you take nitroglycerin? Never    Do you take an aspirin every day?No  - shared decision making - advised to stop today    Asthma Follow-Up    Was ACT completed today?    Yes    ACT Total Scores 7/8/2022   ACT TOTAL SCORE -   ASTHMA ER VISITS -   ASTHMA HOSPITALIZATIONS -   ACT TOTAL SCORE (Goal Greater than or Equal to 20) 25   In the past 12 months, how many times did you visit the emergency room for your asthma without being admitted to the hospital? 0   In the past 12 months, how many times were you hospitalized overnight because of your asthma? 0          How many days per week do you miss taking your asthma controller medication?  0    Please describe any recent triggers for your asthma: None    Have you had any Emergency Room Visits, Urgent Care Visits, or Hospital Admissions since your last office visit?   "No        GERD: controlled on PPI      Current Medications  Current Outpatient Medications   Medication Sig Dispense Refill     acyclovir (ZOVIRAX) 400 MG tablet Take 1 tablet (400 mg) by mouth every 8 hours for 5 days 90 tablet 1     atorvastatin (LIPITOR) 80 MG tablet Take 1 tablet (80 mg) by mouth daily 90 tablet 3     budesonide (PULMICORT FLEXHALER) 180 MCG/ACT inhaler Inhale 1 puff into the lungs 2 times daily 3 each 3     calcium carbonate (TUMS) 500 MG chewable tablet Take 1 chew tab by mouth daily       ibuprofen (ADVIL/MOTRIN) 200 MG tablet Take 200 mg by mouth 2 times daily as needed for mild pain       magnesium 250 MG tablet Take 1 tablet by mouth 2 times daily       metoprolol tartrate (LOPRESSOR) 25 MG tablet Take 1 tablet (25 mg) by mouth 2 times daily 180 tablet 3     montelukast (SINGULAIR) 10 MG tablet Take 1 tablet (10 mg) by mouth At Bedtime 90 tablet 3     MULTI-VITAMIN/IRON OR TABS 1 TABLET DAILY       omeprazole (PRILOSEC) 20 MG DR capsule Take 1 capsule (20 mg) by mouth daily 90 capsule 3     riboflavin (VITAMIN  B-2) 100 MG TABS tablet Take 25 mg by mouth daily       triamterene-HCTZ (MAXZIDE-25) 37.5-25 MG tablet Take 1 tablet by mouth daily 90 tablet 1     acetaminophen (TYLENOL) 500 MG tablet Take 500 mg by mouth 2 times daily as needed for mild pain       albuterol (PROAIR HFA/PROVENTIL HFA/VENTOLIN HFA) 108 (90 Base) MCG/ACT inhaler Inhale 2 puffs into the lungs every 6 hours 18 g 1         Constitutional, HEENT, Cardiovascular, Pulmonary, GI and  systems are negative, except as otherwise noted.          Objective    /70 (BP Location: Left arm, Patient Position: Sitting, Cuff Size: Adult Regular)   Pulse 59   Temp 97.2  F (36.2  C) (Temporal)   Ht 1.588 m (5' 2.5\")   Wt 76.7 kg (169 lb)   LMP  (LMP Unknown)   SpO2 98%   BMI 30.42 kg/m    Body mass index is 30.42 kg/m .  Physical Exam   GENERAL: healthy, alert and no distress  Head: Normocephalic, atraumatic.  Eyes: " Conjunctiva clear, no discharge  Ears: External ears and TMs normal BL.  Nose: Nasal mucosa pink and moist. No discharge.  Mouth / Throat: Mucous membranes moist. Normal dentition.  Pharynx non-erythematous, no exudates.   Neck: Supple, No thyromegaly or nodules. No lymphadenopathy.  RESP: lungs clear to auscultation - no rales, rhonchi or wheezes  CV: regular rate and rhythm, normal S1 S2, no S3 or S4, no murmur, click or rub, no peripheral edema and peripheral pulses strong  Abdomen: Normal bowel sounds. Soft, no masses appreciated, no organomegaly. Non-tender. No guarding, no rebound tenderness.   MS: no gross musculoskeletal defects noted

## 2022-07-08 ENCOUNTER — OFFICE VISIT (OUTPATIENT)
Dept: FAMILY MEDICINE | Facility: CLINIC | Age: 72
End: 2022-07-08

## 2022-07-08 VITALS
DIASTOLIC BLOOD PRESSURE: 70 MMHG | OXYGEN SATURATION: 98 % | WEIGHT: 169 LBS | BODY MASS INDEX: 29.95 KG/M2 | HEART RATE: 59 BPM | SYSTOLIC BLOOD PRESSURE: 116 MMHG | TEMPERATURE: 97.2 F | HEIGHT: 63 IN

## 2022-07-08 DIAGNOSIS — B00.9 HERPES SIMPLEX VIRUS (HSV) INFECTION: ICD-10-CM

## 2022-07-08 DIAGNOSIS — J45.30 MILD PERSISTENT ASTHMA WITHOUT COMPLICATION: ICD-10-CM

## 2022-07-08 DIAGNOSIS — K21.9 GASTROESOPHAGEAL REFLUX DISEASE, UNSPECIFIED WHETHER ESOPHAGITIS PRESENT: ICD-10-CM

## 2022-07-08 DIAGNOSIS — E66.811 OBESITY (BMI 30.0-34.9): ICD-10-CM

## 2022-07-08 DIAGNOSIS — E78.2 MIXED HYPERLIPIDEMIA: Primary | ICD-10-CM

## 2022-07-08 DIAGNOSIS — I67.82 CEREBRAL ISCHEMIA: ICD-10-CM

## 2022-07-08 DIAGNOSIS — R73.01 ELEVATED FASTING GLUCOSE: ICD-10-CM

## 2022-07-08 DIAGNOSIS — G43.109 OCULAR MIGRAINE: ICD-10-CM

## 2022-07-08 DIAGNOSIS — I10 ESSENTIAL HYPERTENSION, BENIGN: ICD-10-CM

## 2022-07-08 DIAGNOSIS — I65.22 ASYMPTOMATIC STENOSIS OF LEFT CAROTID ARTERY: ICD-10-CM

## 2022-07-08 DIAGNOSIS — G47.33 OSA (OBSTRUCTIVE SLEEP APNEA): ICD-10-CM

## 2022-07-08 DIAGNOSIS — M85.852 OSTEOPENIA OF NECK OF LEFT FEMUR: ICD-10-CM

## 2022-07-08 DIAGNOSIS — G43.109 MIGRAINE WITH AURA AND WITHOUT STATUS MIGRAINOSUS, NOT INTRACTABLE: ICD-10-CM

## 2022-07-08 LAB
ALBUMIN SERPL-MCNC: 4.8 G/DL (ref 3.6–5.1)
ALBUMIN/GLOB SERPL: 2 {RATIO} (ref 1–2.5)
ALP SERPL-CCNC: 105 U/L (ref 33–130)
ALT 1742-6: 32 U/L (ref 0–32)
AST 1920-8: 35 U/L (ref 0–35)
BILIRUB SERPL-MCNC: 0.9 MG/DL (ref 0.2–1.2)
BUN SERPL-MCNC: 18 MG/DL (ref 7–25)
BUN/CREATININE RATIO: 16.7 (ref 6–22)
CALCIUM SERPL-MCNC: 9.9 MG/DL (ref 8.6–10.3)
CHLORIDE SERPLBLD-SCNC: 102.6 MMOL/L (ref 98–110)
CHOLEST SERPL-MCNC: 216 MG/DL (ref 0–199)
CHOLEST/HDLC SERPL: 3 {RATIO} (ref 0–5)
CO2 SERPL-SCNC: 27.7 MMOL/L (ref 20–32)
CREAT SERPL-MCNC: 1.08 MG/DL (ref 0.6–1.3)
GLOBULIN, CALCULATED - QUEST: 2.4 (ref 1.9–3.7)
GLUCOSE SERPL-MCNC: 109 MG/DL (ref 60–99)
HDLC SERPL-MCNC: 81 MG/DL (ref 40–150)
LDLC SERPL CALC-MCNC: 103 MG/DL (ref 0–130)
POTASSIUM SERPL-SCNC: 4.21 MMOL/L (ref 3.5–5.3)
PROT SERPL-MCNC: 7.2 G/DL (ref 6.1–8.1)
SODIUM SERPL-SCNC: 140.1 MMOL/L (ref 135–146)
TRIGL SERPL-MCNC: 159 MG/DL (ref 0–149)

## 2022-07-08 PROCEDURE — 99214 OFFICE O/P EST MOD 30 MIN: CPT | Performed by: PHYSICIAN ASSISTANT

## 2022-07-08 PROCEDURE — 36415 COLL VENOUS BLD VENIPUNCTURE: CPT | Performed by: PHYSICIAN ASSISTANT

## 2022-07-08 PROCEDURE — 80061 LIPID PANEL: CPT | Performed by: PHYSICIAN ASSISTANT

## 2022-07-08 PROCEDURE — 80053 COMPREHEN METABOLIC PANEL: CPT | Performed by: PHYSICIAN ASSISTANT

## 2022-07-08 RX ORDER — TRIAMTERENE/HYDROCHLOROTHIAZID 37.5-25 MG
1 TABLET ORAL DAILY
Qty: 90 TABLET | Refills: 1 | Status: SHIPPED | OUTPATIENT
Start: 2022-07-08 | End: 2023-01-24

## 2022-07-08 RX ORDER — METOPROLOL TARTRATE 25 MG/1
25 TABLET, FILM COATED ORAL 2 TIMES DAILY
Qty: 180 TABLET | Refills: 3 | Status: SHIPPED | OUTPATIENT
Start: 2022-07-08 | End: 2023-07-05

## 2022-07-08 RX ORDER — ACYCLOVIR 400 MG/1
400 TABLET ORAL EVERY 8 HOURS
Qty: 90 TABLET | Refills: 1 | Status: SHIPPED | OUTPATIENT
Start: 2022-07-08 | End: 2023-01-10

## 2022-07-08 RX ORDER — BUDESONIDE 180 UG/1
1 AEROSOL, POWDER RESPIRATORY (INHALATION) 2 TIMES DAILY
Qty: 3 EACH | Refills: 3 | Status: SHIPPED | OUTPATIENT
Start: 2022-07-08 | End: 2023-02-06 | Stop reason: ALTCHOICE

## 2022-07-08 RX ORDER — MONTELUKAST SODIUM 10 MG/1
10 TABLET ORAL AT BEDTIME
Qty: 90 TABLET | Refills: 3 | Status: SHIPPED | OUTPATIENT
Start: 2022-07-08 | End: 2022-11-11

## 2022-07-08 RX ORDER — ATORVASTATIN CALCIUM 80 MG/1
80 TABLET, FILM COATED ORAL DAILY
Qty: 90 TABLET | Refills: 3 | Status: SHIPPED | OUTPATIENT
Start: 2022-07-08 | End: 2023-07-05

## 2022-07-08 ASSESSMENT — ASTHMA QUESTIONNAIRES: ACT_TOTALSCORE: 25

## 2022-07-08 NOTE — NURSING NOTE
Chief Complaint   Patient presents with     Recheck Medication     Fasting med check         Pre-visit Screening:  Immunizations:  up to date  Colonoscopy:  is up to date  Mammogram: is up to date  Asthma Action Test/Plan:  NA  PHQ9:  NA  GAD7:  NA  Questioned patient about current smoking habits Pt. has never smoked.  Ok to leave detailed message on voice mail for today's visit only Yes, phone # [phone]

## 2022-07-09 LAB
MAGNESIUM SERPL-MCNC: 2.5 MG/DL (ref 1.5–2.5)
PHOSPHATE SERPL-MCNC: 5.1 MG/DL (ref 2.1–4.3)
T4, FREE, NON-DIALYSIS - QUEST: 1 NG/DL (ref 0.8–1.8)
TSH SERPL-ACNC: 1.15 MIU/L (ref 0.4–4.5)
VITAMIN D, 25-OH, TOTAL - QUEST: 45 NG/ML (ref 30–100)

## 2022-07-11 LAB
CALCIUM SERPL-MCNC: 10.1 MG/DL (ref 8.6–10.4)
PTH, INTACT: 45 PG/ML (ref 16–77)

## 2022-07-19 PROBLEM — M85.852 OSTEOPENIA OF NECK OF LEFT FEMUR: Status: ACTIVE | Noted: 2021-09-14

## 2022-08-15 DIAGNOSIS — M85.852 OSTEOPENIA OF NECK OF LEFT FEMUR: ICD-10-CM

## 2022-08-15 PROCEDURE — 36415 COLL VENOUS BLD VENIPUNCTURE: CPT | Performed by: PHYSICIAN ASSISTANT

## 2022-08-16 LAB — PHOSPHATE SERPL-MCNC: 3.4 MG/DL (ref 2.1–4.3)

## 2022-09-12 ENCOUNTER — APPOINTMENT (OUTPATIENT)
Dept: URBAN - METROPOLITAN AREA CLINIC 253 | Age: 72
Setting detail: DERMATOLOGY
End: 2022-09-14

## 2022-09-12 VITALS — WEIGHT: 169 LBS | RESPIRATION RATE: 14 BRPM | HEIGHT: 63 IN

## 2022-09-12 DIAGNOSIS — D18.0 HEMANGIOMA: ICD-10-CM

## 2022-09-12 DIAGNOSIS — L81.4 OTHER MELANIN HYPERPIGMENTATION: ICD-10-CM

## 2022-09-12 DIAGNOSIS — L70.8 OTHER ACNE: ICD-10-CM

## 2022-09-12 DIAGNOSIS — D22 MELANOCYTIC NEVI: ICD-10-CM

## 2022-09-12 DIAGNOSIS — L82.1 OTHER SEBORRHEIC KERATOSIS: ICD-10-CM

## 2022-09-12 DIAGNOSIS — Z71.89 OTHER SPECIFIED COUNSELING: ICD-10-CM

## 2022-09-12 PROBLEM — D18.01 HEMANGIOMA OF SKIN AND SUBCUTANEOUS TISSUE: Status: ACTIVE | Noted: 2022-09-12

## 2022-09-12 PROBLEM — D22.5 MELANOCYTIC NEVI OF TRUNK: Status: ACTIVE | Noted: 2022-09-12

## 2022-09-12 PROCEDURE — OTHER COUNSELING: OTHER

## 2022-09-12 PROCEDURE — OTHER MIPS QUALITY: OTHER

## 2022-09-12 PROCEDURE — 99213 OFFICE O/P EST LOW 20 MIN: CPT

## 2022-09-12 ASSESSMENT — LOCATION DETAILED DESCRIPTION DERM
LOCATION DETAILED: LEFT INFERIOR LATERAL UPPER BACK
LOCATION DETAILED: LEFT INFERIOR UPPER BACK
LOCATION DETAILED: RIGHT ANTERIOR DISTAL UPPER ARM
LOCATION DETAILED: INFERIOR THORACIC SPINE

## 2022-09-12 ASSESSMENT — LOCATION ZONE DERM
LOCATION ZONE: TRUNK
LOCATION ZONE: TRUNK
LOCATION ZONE: ARM

## 2022-09-12 ASSESSMENT — LOCATION SIMPLE DESCRIPTION DERM
LOCATION SIMPLE: UPPER BACK
LOCATION SIMPLE: RIGHT UPPER ARM
LOCATION SIMPLE: LEFT UPPER BACK
LOCATION SIMPLE: LEFT UPPER BACK

## 2022-09-12 NOTE — PROCEDURE: COUNSELING
Patient Specific Counseling (Will Not Stick From Patient To Patient): Well healed scar, previously biopsied with Accession # [id number] Patient Specific Counseling (Will Not Stick From Patient To Patient): Well healed scar, previously biopsied with Accession # [id number]

## 2022-10-16 ENCOUNTER — HEALTH MAINTENANCE LETTER (OUTPATIENT)
Age: 72
End: 2022-10-16

## 2022-10-26 ENCOUNTER — OFFICE VISIT (OUTPATIENT)
Dept: FAMILY MEDICINE | Facility: CLINIC | Age: 72
End: 2022-10-26

## 2022-10-26 VITALS
DIASTOLIC BLOOD PRESSURE: 70 MMHG | WEIGHT: 162.3 LBS | HEIGHT: 63 IN | SYSTOLIC BLOOD PRESSURE: 128 MMHG | HEART RATE: 63 BPM | TEMPERATURE: 97.6 F | RESPIRATION RATE: 13 BRPM | OXYGEN SATURATION: 100 % | BODY MASS INDEX: 28.76 KG/M2

## 2022-10-26 DIAGNOSIS — I10 ESSENTIAL HYPERTENSION, BENIGN: ICD-10-CM

## 2022-10-26 DIAGNOSIS — R00.2 PALPITATIONS: Primary | ICD-10-CM

## 2022-10-26 DIAGNOSIS — I45.10 RBBB: ICD-10-CM

## 2022-10-26 DIAGNOSIS — R06.09 DYSPNEA ON EXERTION: ICD-10-CM

## 2022-10-26 DIAGNOSIS — J45.30 MILD PERSISTENT ASTHMA WITHOUT COMPLICATION: ICD-10-CM

## 2022-10-26 DIAGNOSIS — E78.2 MIXED HYPERLIPIDEMIA: ICD-10-CM

## 2022-10-26 PROBLEM — D12.5 BENIGN NEOPLASM OF SIGMOID COLON: Status: ACTIVE | Noted: 2021-08-12

## 2022-10-26 PROBLEM — K57.30 DIVERTICULAR DISEASE OF LARGE INTESTINE: Status: ACTIVE | Noted: 2021-08-10

## 2022-10-26 PROBLEM — M70.60 GREATER TROCHANTERIC BURSITIS: Status: ACTIVE | Noted: 2022-10-26

## 2022-10-26 PROBLEM — R51.9 HEADACHE: Status: ACTIVE | Noted: 2022-10-26

## 2022-10-26 PROBLEM — J45.909 ASTHMA: Status: ACTIVE | Noted: 2022-10-26

## 2022-10-26 PROBLEM — E78.00 HYPERCHOLESTEREMIA: Status: ACTIVE | Noted: 2022-10-26
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
## 2022-10-26 PROBLEM — G47.33 MILD OBSTRUCTIVE SLEEP APNEA: Status: ACTIVE | Noted: 2020-10-05

## 2022-10-26 LAB
% GRANULOCYTES: 68.4 %
BUN SERPL-MCNC: 18 MG/DL (ref 7–25)
BUN/CREATININE RATIO: 15.8 (ref 6–22)
CALCIUM SERPL-MCNC: 10 MG/DL (ref 8.6–10.3)
CHLORIDE SERPLBLD-SCNC: 101.8 MMOL/L (ref 98–110)
CO2 SERPL-SCNC: 26.7 MMOL/L (ref 20–32)
CREAT SERPL-MCNC: 1.14 MG/DL (ref 0.6–1.3)
GLUCOSE SERPL-MCNC: 96 MG/DL (ref 60–99)
HCT VFR BLD AUTO: 34.5 % (ref 35–47)
HEMOGLOBIN: 11.3 G/DL (ref 11.7–15.7)
LYMPHOCYTES NFR BLD AUTO: 22.7 %
MCH RBC QN AUTO: 25.4 PG (ref 26–33)
MCHC RBC AUTO-ENTMCNC: 32.8 G/DL (ref 31–36)
MCV RBC AUTO: 77.5 FL (ref 78–100)
MONOCYTES NFR BLD AUTO: 8.9 %
PLATELET COUNT - QUEST: 375 10^9/L (ref 150–375)
POTASSIUM SERPL-SCNC: 4.09 MMOL/L (ref 3.5–5.3)
RBC # BLD AUTO: 4.45 10*12/L (ref 3.8–5.2)
SODIUM SERPL-SCNC: 138.4 MMOL/L (ref 135–146)
WBC # BLD AUTO: 7.9 10*9/L (ref 4–11)

## 2022-10-26 PROCEDURE — 93000 ELECTROCARDIOGRAM COMPLETE: CPT | Performed by: STUDENT IN AN ORGANIZED HEALTH CARE EDUCATION/TRAINING PROGRAM

## 2022-10-26 PROCEDURE — 85025 COMPLETE CBC W/AUTO DIFF WBC: CPT | Performed by: STUDENT IN AN ORGANIZED HEALTH CARE EDUCATION/TRAINING PROGRAM

## 2022-10-26 PROCEDURE — 99214 OFFICE O/P EST MOD 30 MIN: CPT | Mod: 25 | Performed by: STUDENT IN AN ORGANIZED HEALTH CARE EDUCATION/TRAINING PROGRAM

## 2022-10-26 PROCEDURE — 80048 BASIC METABOLIC PNL TOTAL CA: CPT | Performed by: STUDENT IN AN ORGANIZED HEALTH CARE EDUCATION/TRAINING PROGRAM

## 2022-10-26 PROCEDURE — 36415 COLL VENOUS BLD VENIPUNCTURE: CPT | Performed by: STUDENT IN AN ORGANIZED HEALTH CARE EDUCATION/TRAINING PROGRAM

## 2022-10-26 ASSESSMENT — ANXIETY QUESTIONNAIRES
GAD7 TOTAL SCORE: 3
6. BECOMING EASILY ANNOYED OR IRRITABLE: NOT AT ALL
3. WORRYING TOO MUCH ABOUT DIFFERENT THINGS: SEVERAL DAYS
5. BEING SO RESTLESS THAT IT IS HARD TO SIT STILL: NOT AT ALL
7. FEELING AFRAID AS IF SOMETHING AWFUL MIGHT HAPPEN: NOT AT ALL
GAD7 TOTAL SCORE: 3
IF YOU CHECKED OFF ANY PROBLEMS ON THIS QUESTIONNAIRE, HOW DIFFICULT HAVE THESE PROBLEMS MADE IT FOR YOU TO DO YOUR WORK, TAKE CARE OF THINGS AT HOME, OR GET ALONG WITH OTHER PEOPLE: NOT DIFFICULT AT ALL
2. NOT BEING ABLE TO STOP OR CONTROL WORRYING: SEVERAL DAYS
1. FEELING NERVOUS, ANXIOUS, OR ON EDGE: SEVERAL DAYS

## 2022-10-26 ASSESSMENT — PATIENT HEALTH QUESTIONNAIRE - PHQ9
SUM OF ALL RESPONSES TO PHQ QUESTIONS 1-9: 2
5. POOR APPETITE OR OVEREATING: NOT AT ALL

## 2022-10-26 NOTE — NURSING NOTE
Chief Complaint   Patient presents with     Heart Problem     Pt state that her heart is missing some of the beat  since last week - - pt has been feeling short of breath -    Pre-visit Screening:  Immunizations:  up to date  Colonoscopy:  2020- seven yrs plan   Mammogram: Nov 2022  Asthma Action Test/Plan:  Done today  PHQ9:  N/A  GAD7:  N/A  Questioned patient about current smoking habits Pt. has never smoked.  Ok to leave detailed message on voice mail for today's visit only YES, phone #   [phone]

## 2022-10-26 NOTE — PATIENT INSTRUCTIONS
Check pulse and O2 when symptomatic    Try albuterol when symptomatic    Call to schedule stress echo and coordinate cardiac monitor at [facility] - [phone]    Call EMS if any acute worsening
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
## 2022-10-27 LAB — TSH SERPL-ACNC: 1.49 MIU/L (ref 0.4–4.5)

## 2022-10-28 ENCOUNTER — HOSPITAL ENCOUNTER (OUTPATIENT)
Dept: CARDIOLOGY | Facility: CLINIC | Age: 72
Discharge: HOME | End: 2022-10-28
Attending: STUDENT IN AN ORGANIZED HEALTH CARE EDUCATION/TRAINING PROGRAM | Admitting: STUDENT IN AN ORGANIZED HEALTH CARE EDUCATION/TRAINING PROGRAM
Payer: MEDICARE

## 2022-10-28 DIAGNOSIS — R00.2 PALPITATIONS: ICD-10-CM

## 2022-10-28 DIAGNOSIS — I45.10 RBBB: ICD-10-CM

## 2022-10-28 DIAGNOSIS — R06.09 DYSPNEA ON EXERTION: ICD-10-CM

## 2022-10-28 PROCEDURE — 93244 EXT ECG>48HR<7D REV&INTERPJ: CPT | Performed by: INTERNAL MEDICINE

## 2022-10-28 PROCEDURE — 93242 EXT ECG>48HR<7D RECORDING: CPT

## 2022-11-11 ENCOUNTER — HOSPITAL ENCOUNTER (OUTPATIENT)
Dept: CARDIOLOGY | Facility: CLINIC | Age: 72
Discharge: HOME | End: 2022-11-11
Attending: STUDENT IN AN ORGANIZED HEALTH CARE EDUCATION/TRAINING PROGRAM | Admitting: STUDENT IN AN ORGANIZED HEALTH CARE EDUCATION/TRAINING PROGRAM
Payer: MEDICARE

## 2022-11-11 ENCOUNTER — OFFICE VISIT (OUTPATIENT)
Dept: FAMILY MEDICINE | Facility: CLINIC | Age: 72
End: 2022-11-11

## 2022-11-11 VITALS
WEIGHT: 160 LBS | OXYGEN SATURATION: 99 % | TEMPERATURE: 97.6 F | RESPIRATION RATE: 22 BRPM | DIASTOLIC BLOOD PRESSURE: 74 MMHG | HEART RATE: 73 BPM | BODY MASS INDEX: 28.8 KG/M2 | SYSTOLIC BLOOD PRESSURE: 126 MMHG

## 2022-11-11 DIAGNOSIS — R06.09 DYSPNEA ON EXERTION: ICD-10-CM

## 2022-11-11 DIAGNOSIS — D50.9 IRON DEFICIENCY ANEMIA, UNSPECIFIED IRON DEFICIENCY ANEMIA TYPE: Primary | ICD-10-CM

## 2022-11-11 PROCEDURE — 93016 CV STRESS TEST SUPVJ ONLY: CPT | Performed by: INTERNAL MEDICINE

## 2022-11-11 PROCEDURE — 93350 STRESS TTE ONLY: CPT | Mod: TC

## 2022-11-11 PROCEDURE — 93321 DOPPLER ECHO F-UP/LMTD STD: CPT | Mod: 26 | Performed by: INTERNAL MEDICINE

## 2022-11-11 PROCEDURE — 93017 CV STRESS TEST TRACING ONLY: CPT

## 2022-11-11 PROCEDURE — 99214 OFFICE O/P EST MOD 30 MIN: CPT | Performed by: STUDENT IN AN ORGANIZED HEALTH CARE EDUCATION/TRAINING PROGRAM

## 2022-11-11 PROCEDURE — 93325 DOPPLER ECHO COLOR FLOW MAPG: CPT | Mod: TC

## 2022-11-11 PROCEDURE — 93018 CV STRESS TEST I&R ONLY: CPT | Performed by: INTERNAL MEDICINE

## 2022-11-11 PROCEDURE — 93350 STRESS TTE ONLY: CPT | Mod: 26 | Performed by: INTERNAL MEDICINE

## 2022-11-11 PROCEDURE — 93325 DOPPLER ECHO COLOR FLOW MAPG: CPT | Mod: 26 | Performed by: INTERNAL MEDICINE

## 2022-11-11 NOTE — NURSING NOTE
Chief Complaint   Patient presents with     Results     Follow up on three day heart monitor and ECHO results, done recently, results in system     Pre-visit Screening:  Immunizations:  up to date  Colonoscopy:  is up to date  Mammogram: is up to date  Asthma Action Test/Plan:  NA  PHQ9:  NA  GAD7:  NA  Questioned patient about current smoking habits Pt. has never smoked.  Ok to leave detailed message on voice mail for today's visit only Yes, phone # [phone]

## 2022-11-11 NOTE — PROGRESS NOTES
Assessment & Plan       ICD-10-CM    1. Iron deficiency anemia, unspecified iron deficiency anemia type  D50.9       2. Dyspnea on exertion  R06.09          Reviewed testing results with pt in detail  Suspect sx related to iron def, which she has a hx of intermittently for 10+ yrs.  Plan below     Patient Instructions   Reassuring cardiac testing, let me know if you'd like to visit with Cardiology    Start iron supplement    Follow-up in 2 months for repeat labs. Will have you see GI if not improving.     No blood donation until follow-up      >30 minutes spent on the date of the encounter doing chart review, history and exam, documentation and further activities per the note    Son Munoz MD, [facility]       Subjective     Priscilla Linda is a 72 year old female who presents to clinic today for the following health issues:    HPI   Chief Complaint   Patient presents with     Results     Follow up on three day heart monitor and ECHO results, done recently, results in system      Sx unaffected by albuterol   Zio patch showed sx correlate with PVCs  Stress echo wnl  No change in sx from last visit.  Labs show iron def. Colonoscopy 2021 reviewed, 7 yr follow-up. No blood or melena in stools.         Objective    /74 (BP Location: Right arm, Patient Position: Sitting, Cuff Size: Adult Large)   Pulse 73   Temp 97.6  F (36.4  C) (Temporal)   Resp 22   Wt 72.6 kg (160 lb)   LMP  (LMP Unknown)   SpO2 99%   BMI 28.80 kg/m    Body mass index is 28.8 kg/m .  Alert, NAD  NC/AT  Sclerae anicteric  RRR  Resp nonlabored  Skin warm and dry  No focal neuro deficits. Speech intact.   Appropriate affect  Normal gait.    Office Visit on 10/26/2022   Component Date Value Ref Range Status     WBC 10/26/2022 7.9  4.0 - 11 10*9/L Final     RBC Count 10/26/2022 4.45  3.8 - 5.2 10*12/L Final     Hemoglobin 10/26/2022 11.3 (A)  11.7 - 15.7 g/dL Final     Hematocrit 10/26/2022 34.5 (A)  35.0 -  47.0 % Final     MCV 10/26/2022 77.5 (A)  78 - 100 fL Final     MCH 10/26/2022 25.4 (A)  26 - 33 pg Final     MCHC 10/26/2022 32.8  31 - 36 g/dL Final     Platelet Count 10/26/2022 375  150 - 375 10^9/L Final     % Granulocytes 10/26/2022 68.4  % Final     % Lymphocytes 10/26/2022 22.7  % Final     % Monocytes 10/26/2022 8.9  % Final     Carbon Dioxide 10/26/2022 26.7  20 - 32 mmol/L Final     Creatinine 10/26/2022 1.14  0.60 - 1.30 mg/dL Final     Glucose 10/26/2022 96  60 - 99 mg/dL Final     Sodium 10/26/2022 138.4  135 - 146 mmol/L Final     Potassium 10/26/2022 4.09  3.5 - 5.3 mmol/L Final     Chloride 10/26/2022 101.8  98 - 110 mmol/L Final     Urea Nitrogen 10/26/2022 18  7 - 25 mg/dL Final     Calcium 10/26/2022 10.0  8.6 - 10.3 mg/dL Final     BUN/Creatinine Ratio 10/26/2022 15.8  6 - 22 Final     TSH 10/26/2022 1.49  0.40 - 4.50 mIU/L Final

## 2022-11-11 NOTE — PATIENT INSTRUCTIONS
Reassuring cardiac testing, let me know if you'd like to visit with Cardiology    Start iron supplement    Follow-up in 2 months for repeat labs. Will have you see GI if not improving.     No blood donation until follow-up

## 2023-01-10 ENCOUNTER — OFFICE VISIT (OUTPATIENT)
Dept: FAMILY MEDICINE | Facility: CLINIC | Age: 73
End: 2023-01-10

## 2023-01-10 VITALS
HEIGHT: 63 IN | OXYGEN SATURATION: 99 % | WEIGHT: 160 LBS | BODY MASS INDEX: 28.35 KG/M2 | SYSTOLIC BLOOD PRESSURE: 114 MMHG | HEART RATE: 65 BPM | TEMPERATURE: 97.2 F | DIASTOLIC BLOOD PRESSURE: 68 MMHG

## 2023-01-10 DIAGNOSIS — M70.61 TROCHANTERIC BURSITIS OF RIGHT HIP: ICD-10-CM

## 2023-01-10 DIAGNOSIS — M79.18 GLUTEAL PAIN: ICD-10-CM

## 2023-01-10 DIAGNOSIS — J45.30 MILD PERSISTENT ASTHMA WITHOUT COMPLICATION: ICD-10-CM

## 2023-01-10 DIAGNOSIS — D50.9 IRON DEFICIENCY ANEMIA, UNSPECIFIED IRON DEFICIENCY ANEMIA TYPE: Primary | ICD-10-CM

## 2023-01-10 DIAGNOSIS — R06.09 DYSPNEA ON EXERTION: ICD-10-CM

## 2023-01-10 LAB
% GRANULOCYTES: 80.1 %
HCT VFR BLD AUTO: 44.2 % (ref 35–47)
HEMOGLOBIN: 14.6 G/DL (ref 11.7–15.7)
LYMPHOCYTES NFR BLD AUTO: 13.6 %
MCH RBC QN AUTO: 28.3 PG (ref 26–33)
MCHC RBC AUTO-ENTMCNC: 33 G/DL (ref 31–36)
MCV RBC AUTO: 85.9 FL (ref 78–100)
MONOCYTES NFR BLD AUTO: 6.3 %
PLATELET COUNT - QUEST: 292 10^9/L (ref 150–375)
RBC # BLD AUTO: 5.15 10*12/L (ref 3.8–5.2)
WBC # BLD AUTO: 10.1 10*9/L (ref 4–11)

## 2023-01-10 PROCEDURE — 85025 COMPLETE CBC W/AUTO DIFF WBC: CPT | Performed by: STUDENT IN AN ORGANIZED HEALTH CARE EDUCATION/TRAINING PROGRAM

## 2023-01-10 PROCEDURE — 99215 OFFICE O/P EST HI 40 MIN: CPT | Mod: 25 | Performed by: STUDENT IN AN ORGANIZED HEALTH CARE EDUCATION/TRAINING PROGRAM

## 2023-01-10 PROCEDURE — 20610 DRAIN/INJ JOINT/BURSA W/O US: CPT | Performed by: STUDENT IN AN ORGANIZED HEALTH CARE EDUCATION/TRAINING PROGRAM

## 2023-01-10 PROCEDURE — 36415 COLL VENOUS BLD VENIPUNCTURE: CPT | Performed by: STUDENT IN AN ORGANIZED HEALTH CARE EDUCATION/TRAINING PROGRAM

## 2023-01-10 RX ADMIN — TRIAMCINOLONE ACETONIDE 40 MG: 40 INJECTION, SUSPENSION INTRA-ARTICULAR; INTRAMUSCULAR at 10:13

## 2023-01-10 RX ADMIN — TRIAMCINOLONE ACETONIDE 40 MG: 40 INJECTION, SUSPENSION INTRA-ARTICULAR; INTRAMUSCULAR at 12:43

## 2023-01-10 ASSESSMENT — ASTHMA QUESTIONNAIRES: ACT_TOTALSCORE: 22

## 2023-01-10 NOTE — PROGRESS NOTES
Assessment & Plan       ICD-10-CM    1. Iron deficiency anemia, unspecified iron deficiency anemia type  D50.9 FERROUS GLUCONATE IRON PO     HEMOGRAM PLATELET DIFF (BFP)     IRON AND IRON BINDING CAPACITY ([facility])     FERRITIN ([facility])     Adult GI  Referral - Consult Only      2. Dyspnea on exertion  R06.09       3. Mild persistent asthma without complication  J45.30 fluticasone (FLOVENT DISKUS) 100 MCG/ACT inhaler      4. Trochanteric bursitis of right hip  M70.61 DRAIN/INJECT LARGE JOINT/BURSA     triamcinolone (KENALOG-40) injection 40 mg      5. Gluteal pain  M79.18          Reassuring sx have improved with iron replacement. Recheck labs today. Regular blood donations likely contributed to iron def, as well as NSAIDs increase risk for gastritis, will focus on MSK issues to decrease need for NSAIDs. Will have pt see GI to further eval for contributing causes as well.     Asthma well controlled, continue current meds    R lateral hip pain most c/w troch bursitis/tendinopathy. Discussed nature and tx options, elected CSI here which was well tolerated. Will restart HEP as well.     Patient Instructions   Recheck iron levels today    If labs improved can decrease iron every other day    Call to schedule with  [facility]  [address]  [address]  [place] [address] [phone] - appt line    Would refer to colorectal if gluteal cyst recurs     Let me know how hip feels in 2-3 weeks, sooner if concerns.         Reasons to follow-up sooner or seek emergent care reviewed.     45 minutes spent on the date of the encounter doing chart review, history and exam, documentation and further activities per the note excluding procedure time    Son Munoz MD, [facility]       Subjective     Priscilla OLGUIN Alexei is a 72 year old female who presents to clinic today for the following health issues:    HPI   Chief Complaint   Patient presents with     Recheck Medication      "Recheck Iron levels since last visit which discussed echo results      Started iron supplement after last visit. Tolerating well, no side effects.   Feels better. Uses snowblower without any sx she had this summer with yardwork.  No palpitations.   Long time blood donor, sometimes rejected for hemoglobin  No melena or blood in stools/urine.   No vaginal bleeding.   Does take ibuprofen daily for R hip pain.     Asthma Follow-Up    Was ACT completed today?    Yes    ACT Total Scores 1/10/2023   ACT TOTAL SCORE -   ASTHMA ER VISITS -   ASTHMA HOSPITALIZATIONS -   ACT TOTAL SCORE (Goal Greater than or Equal to 20) 22   In the past 12 months, how many times did you visit the emergency room for your asthma without being admitted to the hospital? 0   In the past 12 months, how many times were you hospitalized overnight because of your asthma? 0          How many days per week do you miss taking your asthma controller medication?  0    Have you had any Emergency Room Visits, Urgent Care Visits, or Hospital Admissions since your last office visit?  No    Chronic lateral R hip pain. Worse at night, laying on side. No acute injury. No radicular sx. Worries about arthritis. Hx of bursa injection at [facility] 2 yrs ago.         Objective    /68 (BP Location: Right arm, Patient Position: Sitting, Cuff Size: Adult Large)   Pulse 65   Temp 97.2  F (36.2  C) (Temporal)   Ht 1.588 m (5' 2.5\")   Wt 72.6 kg (160 lb)   LMP  (LMP Unknown)   SpO2 99%   BMI 28.80 kg/m    Body mass index is 28.8 kg/m .  Alert, NAD  NC/AT  Sclerae anicteric  RRR  Resp nonlabored  Skin warm and dry  No focal neuro deficits. Speech intact.   Appropriate affect  Normal gait.    L side of gluteal cleft 1cm scabbed lesion, no tenderness or fluctuance.     R hip symm painfree ROM  Focal TTP at GT only  Neg FADIR and BRITTANY  CMS intact distally    Labs reviewed.    Prior R hip XRs from [facility] reviewed with patient           RIGHT TROCHANTERIC BURSAL " INJECTION:  The patient was informed of the risk and benefits and signed an informed consent form. The patient agreed to proceed. The area of the right trochanteric bursa was prepped in a sterile fashion using chloraprep. 40 mg of Kenalog and 4 cc of 1% lidocaine was injected into the area. Patient tolerated procedure well and there were no complications. Following the procedure the patient had relief of her symptoms. Postinjection instructions were given.

## 2023-01-10 NOTE — NURSING NOTE
Chief Complaint   Patient presents with     Recheck Medication     Recheck Iron levels since last visit which discussed echo results         Pre-visit Screening:  Immunizations:  up to date  Colonoscopy:  is up to date  Mammogram: is up to date  Asthma Action Test/Plan:  NA  PHQ9:  NA  GAD7:  NA  Questioned patient about current smoking habits Pt. has never smoked.  Ok to leave detailed message on voice mail for today's visit only Yes, phone # [phone]

## 2023-01-10 NOTE — PATIENT INSTRUCTIONS
Recheck iron levels today    If labs improved can decrease iron every other day    Call to schedule with  [facility]  [address]  [address]  [place]  [place] [id number] [phone] - appt line    Would refer to colorectal if gluteal cyst recurs     Let me know how hip feels in 2-3 weeks, sooner if concerns.

## 2023-01-11 LAB
% SATURATION - QUEST: 29 % (CALC) (ref 16–45)
FERRITIN SERPL-MCNC: 29 NG/ML (ref 16–288)
IRON: 112 MCG/DL (ref 45–160)
TIBC - QUEST: 389 MCG/DL (CALC) (ref 250–450)

## 2023-01-11 RX ORDER — TRIAMCINOLONE ACETONIDE 40 MG/ML
40 INJECTION, SUSPENSION INTRA-ARTICULAR; INTRAMUSCULAR ONCE
Status: COMPLETED | OUTPATIENT
Start: 2023-01-10 | End: 2023-01-10

## 2023-01-13 ENCOUNTER — TRANSFERRED RECORDS (OUTPATIENT)
Dept: FAMILY MEDICINE | Facility: CLINIC | Age: 73
End: 2023-01-13

## 2023-01-18 RX ORDER — TRIAMCINOLONE ACETONIDE 40 MG/ML
40 INJECTION, SUSPENSION INTRA-ARTICULAR; INTRAMUSCULAR ONCE
Status: COMPLETED | OUTPATIENT
Start: 2023-01-18 | End: 2023-01-10

## 2023-01-20 DIAGNOSIS — I10 ESSENTIAL HYPERTENSION, BENIGN: ICD-10-CM

## 2023-01-20 RX ORDER — TRIAMTERENE/HYDROCHLOROTHIAZID 37.5-25 MG
TABLET ORAL
Qty: 90 TABLET | Refills: 0 | COMMUNITY
Start: 2023-01-20

## 2023-01-20 NOTE — TELEPHONE ENCOUNTER
Priscilla Linda is requesting a refill of:    Refused Prescriptions:                       Disp   Refills    triamterene-HCTZ (MAXZIDE-25) 37.5-25 MG t*90 tab*0        Sig: TAKE ONE TABLET BY MOUTH ONE TIME DAILY  Refused By: GERARDO ARECHIGA  Reason for Refusal: Patient needs appointment    Last med recheck was 07/08/22 advised to return in 6 months. Pt due for OV

## 2023-01-23 ENCOUNTER — MYC MEDICAL ADVICE (OUTPATIENT)
Dept: FAMILY MEDICINE | Facility: CLINIC | Age: 73
End: 2023-01-23

## 2023-01-23 DIAGNOSIS — I10 ESSENTIAL HYPERTENSION, BENIGN: ICD-10-CM

## 2023-01-24 RX ORDER — TRIAMTERENE/HYDROCHLOROTHIAZID 37.5-25 MG
1 TABLET ORAL DAILY
Qty: 90 TABLET | Refills: 1 | Status: SHIPPED | OUTPATIENT
Start: 2023-01-24 | End: 2023-07-05

## 2023-01-24 NOTE — TELEPHONE ENCOUNTER
Priscilla Linda is requesting a refill of:    Pending Prescriptions:                       Disp   Refills    triamterene-HCTZ (MAXZIDE-25) 37.5-25 MG *90 tab*1            Sig: Take 1 tablet by mouth daily    Please advise  Thanks, Maliha

## 2023-02-06 ENCOUNTER — OFFICE VISIT (OUTPATIENT)
Dept: FAMILY MEDICINE | Facility: CLINIC | Age: 73
End: 2023-02-06

## 2023-02-06 VITALS
WEIGHT: 160 LBS | DIASTOLIC BLOOD PRESSURE: 76 MMHG | SYSTOLIC BLOOD PRESSURE: 120 MMHG | TEMPERATURE: 98 F | HEART RATE: 75 BPM | OXYGEN SATURATION: 97 % | BODY MASS INDEX: 28.8 KG/M2 | RESPIRATION RATE: 20 BRPM

## 2023-02-06 DIAGNOSIS — L72.0 EPIDERMOID CYST OF SKIN OF BACK: Primary | ICD-10-CM

## 2023-02-06 DIAGNOSIS — Z65.8 PSYCHOSOCIAL STRESSORS: ICD-10-CM

## 2023-02-06 DIAGNOSIS — D50.9 IRON DEFICIENCY ANEMIA, UNSPECIFIED IRON DEFICIENCY ANEMIA TYPE: ICD-10-CM

## 2023-02-06 PROCEDURE — 99213 OFFICE O/P EST LOW 20 MIN: CPT | Mod: 25 | Performed by: STUDENT IN AN ORGANIZED HEALTH CARE EDUCATION/TRAINING PROGRAM

## 2023-02-06 PROCEDURE — 10060 I&D ABSCESS SIMPLE/SINGLE: CPT | Performed by: STUDENT IN AN ORGANIZED HEALTH CARE EDUCATION/TRAINING PROGRAM

## 2023-02-06 NOTE — PROGRESS NOTES
Assessment & Plan     1. Epidermoid cyst of skin of back  Inflamed with small amount of fluctuance today. Discussed home mgmt vs I&D today and patient elected to proceed with I?&D. Tolerated well, home cares reviewed   - Incision and drainage    2. Psychosocial stressors  Reviewed and offered support    3. Iron deficiency anemia, unspecified iron deficiency anemia type  Sx resolved, continue current Fe. GI follow-up as planned     Reasons to follow-up sooner or seek emergent care reviewed.     >20 minutes spent on the date of the encounter doing chart review, history and exam, documentation and further activities per the note excl procedure time    Son Munoz MD, [facility]       Subjective     Priscilla SHARIF Alexei is a 72 year old female who presents to clinic today for the following health issues:    HPI   Chief Complaint   Patient presents with     Consult For     Has a cyst on back, has been around for awhile, comes and goes, noticed this last Friday, wants to know if excision can be done here or if she needs to go to dermatology       Cyst has been present for years, intermittent soreness. Has been more noticeable for past week. No drainage. No f/c, n/v.     Mother with limited life expectancy, stressors amongst siblings re her care    Tolerating iron well. Prior sx have completely resolved. Has EGD/colonoscopy scheduled with [facility].         Objective    /76 (BP Location: Left arm, Patient Position: Sitting, Cuff Size: Adult Large)   Pulse 75   Temp 98  F (36.7  C) (Temporal)   Resp 20   Wt 72.6 kg (160 lb)   LMP  (LMP Unknown)   SpO2 97%   BMI 28.80 kg/m    Body mass index is 28.8 kg/m .  Alert, NAD  NC/AT  Sclerae anicteric  Regular  Resp nonlabored  Skin warm and dry, 2x2cm erythematous cyst L side midback, mild fluctuance.  No focal neuro deficits. Speech intact.   Appropriate affect  Normal gait.    Labs reviewed.         PROCEDURE:   After informed consent was  obtained, using chloraprep for cleansing   and 2ml 1% Lidocaine without epinephrine for anesthetic, with sterile   technique, an incision was made into the abscess cavity which was   then drained of purulent material. Procedure well   tolerated.  Dressing applied and wound care instructions provided.   Continue frequent warm compresses/soaks   until resolves. Return to clinic prn for pain, increased   swelling or fever.

## 2023-02-06 NOTE — NURSING NOTE
Chief Complaint   Patient presents with     Consult For     Has a cyst on back, has been around for awhile, comes and goes, noticed this last Friday, wants to know if excision can be done here or if she needs to go to dermatology      Pre-visit Screening:  Immunizations:  up to date  Colonoscopy:  is up to date  Mammogram: is up to date  Asthma Action Test/Plan:  NA  PHQ9:  NA  GAD7:  NA  Questioned patient about current smoking habits Pt. has never smoked.  Ok to leave detailed message on voice mail for today's visit only Yes, phone # [phone]

## 2023-02-15 ENCOUNTER — HOSPITAL ENCOUNTER (OUTPATIENT)
Dept: MAMMOGRAPHY | Facility: CLINIC | Age: 73
Discharge: HOME | End: 2023-02-15
Attending: STUDENT IN AN ORGANIZED HEALTH CARE EDUCATION/TRAINING PROGRAM | Admitting: STUDENT IN AN ORGANIZED HEALTH CARE EDUCATION/TRAINING PROGRAM
Payer: MEDICARE

## 2023-02-15 DIAGNOSIS — Z12.31 VISIT FOR SCREENING MAMMOGRAM: ICD-10-CM

## 2023-02-15 PROCEDURE — 77067 SCR MAMMO BI INCL CAD: CPT

## 2023-03-07 ENCOUNTER — TRANSFERRED RECORDS (OUTPATIENT)
Dept: FAMILY MEDICINE | Facility: CLINIC | Age: 73
End: 2023-03-07

## 2023-03-23 ENCOUNTER — TRANSFERRED RECORDS (OUTPATIENT)
Dept: FAMILY MEDICINE | Facility: CLINIC | Age: 73
End: 2023-03-23

## 2023-04-01 ENCOUNTER — HEALTH MAINTENANCE LETTER (OUTPATIENT)
Age: 73
End: 2023-04-01

## 2023-04-03 ENCOUNTER — TRANSFERRED RECORDS (OUTPATIENT)
Dept: FAMILY MEDICINE | Facility: CLINIC | Age: 73
End: 2023-04-03

## 2023-04-11 ENCOUNTER — TRANSFERRED RECORDS (OUTPATIENT)
Dept: FAMILY MEDICINE | Facility: CLINIC | Age: 73
End: 2023-04-11

## 2023-04-27 ENCOUNTER — OFFICE VISIT (OUTPATIENT)
Dept: FAMILY MEDICINE | Facility: CLINIC | Age: 73
End: 2023-04-27

## 2023-04-27 VITALS
WEIGHT: 160.2 LBS | HEART RATE: 72 BPM | RESPIRATION RATE: 20 BRPM | HEIGHT: 63 IN | BODY MASS INDEX: 28.39 KG/M2 | DIASTOLIC BLOOD PRESSURE: 70 MMHG | SYSTOLIC BLOOD PRESSURE: 118 MMHG | TEMPERATURE: 97.1 F
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
## 2023-04-27 DIAGNOSIS — S10.96XA INSECT BITE OF NECK, INITIAL ENCOUNTER: Primary | ICD-10-CM

## 2023-04-27 PROCEDURE — 99213 OFFICE O/P EST LOW 20 MIN: CPT | Performed by: FAMILY MEDICINE
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
## 2023-04-27 NOTE — NURSING NOTE
Priscilla SHARIF Bahenalyndsay is here for a possible bug bite on her neck. Has a red mp on her neck from it.  Questioned patient about current smoking habits.  Pt. has never smoked.  PULSE regular  My Chart: active  CLASSIFICATION OF OVERWEIGHT AND OBESITY BY BMI                        Obesity Class           BMI(kg/m2)  Underweight                                    < 18.5  Normal                                         18.5-24.9  Overweight                                     25.0-29.9  OBESITY                     I                  30.0-34.9                             II                 35.0-39.9  EXTREME OBESITY             III                >40                            Patient's  BMI Body mass index is 28.83 kg/m .  http://hin.nhlbi.nih.gov/menuplanner/menu.cgi  Pre-visit planning  Immunizations - up to date  Colonoscopy - is up to date  Mammogram -   Asthma -   PHQ9 -    JEFFY-7 -      The patient has verbalized that it is ok to leave a detailed voice message on the patient's cell phone with results/recommendations from this visit.

## 2023-05-31 ENCOUNTER — TRANSFERRED RECORDS (OUTPATIENT)
Dept: FAMILY MEDICINE | Facility: CLINIC | Age: 73
End: 2023-05-31

## 2023-07-05 ENCOUNTER — OFFICE VISIT (OUTPATIENT)
Dept: FAMILY MEDICINE | Facility: CLINIC | Age: 73
End: 2023-07-05

## 2023-07-05 VITALS
RESPIRATION RATE: 20 BRPM | SYSTOLIC BLOOD PRESSURE: 108 MMHG | DIASTOLIC BLOOD PRESSURE: 66 MMHG | BODY MASS INDEX: 27.71 KG/M2 | TEMPERATURE: 97.2 F | HEIGHT: 63 IN | WEIGHT: 156.4 LBS | HEART RATE: 68 BPM

## 2023-07-05 DIAGNOSIS — M54.42 CHRONIC LOW BACK PAIN WITH LEFT-SIDED SCIATICA, UNSPECIFIED BACK PAIN LATERALITY: Primary | ICD-10-CM

## 2023-07-05 DIAGNOSIS — M70.62 TROCHANTERIC BURSITIS OF BOTH HIPS: ICD-10-CM

## 2023-07-05 DIAGNOSIS — M70.61 TROCHANTERIC BURSITIS OF BOTH HIPS: ICD-10-CM

## 2023-07-05 DIAGNOSIS — K21.9 GASTROESOPHAGEAL REFLUX DISEASE, UNSPECIFIED WHETHER ESOPHAGITIS PRESENT: ICD-10-CM

## 2023-07-05 DIAGNOSIS — Z00.00 ENCOUNTER FOR MEDICARE ANNUAL WELLNESS EXAM: ICD-10-CM

## 2023-07-05 DIAGNOSIS — R49.0 VOICE HOARSENESS: ICD-10-CM

## 2023-07-05 DIAGNOSIS — G89.29 CHRONIC LOW BACK PAIN WITH LEFT-SIDED SCIATICA, UNSPECIFIED BACK PAIN LATERALITY: Primary | ICD-10-CM

## 2023-07-05 DIAGNOSIS — E78.2 MIXED HYPERLIPIDEMIA: ICD-10-CM

## 2023-07-05 DIAGNOSIS — I65.22 ASYMPTOMATIC STENOSIS OF LEFT CAROTID ARTERY: ICD-10-CM

## 2023-07-05 DIAGNOSIS — D50.9 IRON DEFICIENCY ANEMIA, UNSPECIFIED IRON DEFICIENCY ANEMIA TYPE: ICD-10-CM

## 2023-07-05 DIAGNOSIS — I10 ESSENTIAL HYPERTENSION, BENIGN: ICD-10-CM

## 2023-07-05 LAB
BUN SERPL-MCNC: 17 MG/DL (ref 7–25)
BUN/CREATININE RATIO: 16.8 (ref 6–32)
CALCIUM SERPL-MCNC: 10.3 MG/DL (ref 8.6–10.3)
CHLORIDE SERPLBLD-SCNC: 102.9 MMOL/L (ref 98–110)
CHOLEST SERPL-MCNC: 221 MG/DL (ref 0–199)
CHOLEST/HDLC SERPL: 2 {RATIO} (ref 0–5)
CO2 SERPL-SCNC: 28.4 MMOL/L (ref 20–32)
CREAT SERPL-MCNC: 1.01 MG/DL (ref 0.6–1.3)
GLUCOSE SERPL-MCNC: 97 MG/DL (ref 60–99)
HDLC SERPL-MCNC: 94 MG/DL (ref 40–150)
HEMOGLOBIN: 16.1 G/DL (ref 11.7–15.7)
LDLC SERPL CALC-MCNC: 95 MG/DL (ref 0–130)
POTASSIUM SERPL-SCNC: 4.07 MMOL/L (ref 3.5–5.3)
SODIUM SERPL-SCNC: 140.7 MMOL/L (ref 135–146)
TRIGL SERPL-MCNC: 162 MG/DL (ref 0–149)

## 2023-07-05 PROCEDURE — G0439 PPPS, SUBSEQ VISIT: HCPCS | Performed by: STUDENT IN AN ORGANIZED HEALTH CARE EDUCATION/TRAINING PROGRAM

## 2023-07-05 PROCEDURE — 85018 HEMOGLOBIN: CPT | Performed by: STUDENT IN AN ORGANIZED HEALTH CARE EDUCATION/TRAINING PROGRAM

## 2023-07-05 PROCEDURE — 80048 BASIC METABOLIC PNL TOTAL CA: CPT | Performed by: STUDENT IN AN ORGANIZED HEALTH CARE EDUCATION/TRAINING PROGRAM

## 2023-07-05 PROCEDURE — 99214 OFFICE O/P EST MOD 30 MIN: CPT | Mod: 25 | Performed by: STUDENT IN AN ORGANIZED HEALTH CARE EDUCATION/TRAINING PROGRAM

## 2023-07-05 PROCEDURE — 36415 COLL VENOUS BLD VENIPUNCTURE: CPT | Performed by: STUDENT IN AN ORGANIZED HEALTH CARE EDUCATION/TRAINING PROGRAM

## 2023-07-05 PROCEDURE — 80061 LIPID PANEL: CPT | Performed by: STUDENT IN AN ORGANIZED HEALTH CARE EDUCATION/TRAINING PROGRAM

## 2023-07-05 RX ORDER — ATORVASTATIN CALCIUM 80 MG/1
80 TABLET, FILM COATED ORAL DAILY
Qty: 90 TABLET | Refills: 3 | Status: SHIPPED | OUTPATIENT
Start: 2023-07-05 | End: 2024-06-21

## 2023-07-05 RX ORDER — TRIAMTERENE/HYDROCHLOROTHIAZID 37.5-25 MG
1 TABLET ORAL DAILY
Qty: 90 TABLET | Refills: 1 | Status: SHIPPED | OUTPATIENT
Start: 2023-07-05 | End: 2024-01-09

## 2023-07-05 RX ORDER — METOPROLOL TARTRATE 25 MG/1
25 TABLET, FILM COATED ORAL 2 TIMES DAILY
Qty: 180 TABLET | Refills: 3 | Status: SHIPPED | OUTPATIENT
Start: 2023-07-05 | End: 2024-06-21

## 2023-07-05 ASSESSMENT — ASTHMA QUESTIONNAIRES: ACT_TOTALSCORE: 21
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
## 2023-07-05 NOTE — NURSING NOTE
Priscilla Linda is here for AWV, med check and also HGB check.    Questioned patient about current smoking habits.  Pt. has never smoked.  PULSE regular  My Chart: active  CLASSIFICATION OF OVERWEIGHT AND OBESITY BY BMI                        Obesity Class           BMI(kg/m2)  Underweight                                    < 18.5  Normal                                         18.5-24.9  Overweight                                     25.0-29.9  OBESITY                     I                  30.0-34.9                             II                 35.0-39.9  EXTREME OBESITY             III                >40                            Patient's  BMI Body mass index is 28.15 kg/m .  http://hin.nhlbi.nih.gov/menuplanner/menu.cgi  Pre-visit planning  Immunizations - up to date  Colonoscopy - is up to date  Mammogram - is up to date  Asthma -   PHQ9 -    JEFFY-7 -      The patient has verbalized that it is ok to leave a detailed voice message on the patient's cell phone with results/recommendations from this visit.

## 2023-07-05 NOTE — PATIENT INSTRUCTIONS
Blood work today    No change to medications    [facility]  [address] [place] [address]  [phone] -- appt line    Physical Therapy  [facility]   [address] [place] [address]  [phone] -- appt line    Follow-up 6 months, sooner if needed

## 2023-07-06 LAB
% SATURATION - QUEST: 51 % (CALC) (ref 16–45)
FERRITIN SERPL-MCNC: 32 NG/ML (ref 16–288)
IRON: 202 MCG/DL (ref 45–160)
TIBC - QUEST: 397 MCG/DL (CALC) (ref 250–450)

## 2023-07-06 NOTE — PROGRESS NOTES
Assessment & Plan       ICD-10-CM    1. Chronic low back pain with left-sided sciatica, unspecified back pain laterality  M54.42 Physical Therapy Referral    G89.29       2. Asymptomatic stenosis of left carotid artery  I65.22 atorvastatin (LIPITOR) 80 MG tablet      3. Essential hypertension, benign  I10 Basic Metabolic Panel (BFP)     metoprolol tartrate (LOPRESSOR) 25 MG tablet     triamterene-HCTZ (MAXZIDE-25) 37.5-25 MG tablet      4. Gastroesophageal reflux disease, unspecified whether esophagitis present  K21.9 omeprazole (PRILOSEC) 20 MG DR capsule      5. Trochanteric bursitis of both hips  M70.61 Physical Therapy Referral    M70.62       6. Mixed hyperlipidemia  E78.2 Lipid Panel (BFP)      7. Iron deficiency anemia, unspecified iron deficiency anemia type  D50.9 HEMOGLOBIN (BFP)     FERRITIN ([facility])     IRON AND IRON BINDING CAPACITY ([facility])      8. Voice hoarseness  R49.0 Adult ENT  Referral      9. Encounter for Medicare annual wellness exam  Z00.00            Patient Instructions   Blood work today    No change to medications    [facility]  [address] [place] [address]  [phone] -- appt line    Physical Therapy  [facility]   [address] [place] [address] [place] [address]  [phone] -- appt line    Follow-up 6 months, sooner if needed            Reasons to follow-up sooner or seek emergent care reviewed.     >30 minutes spent on the date of the encounter doing chart review, history and exam, documentation and further activities per the note excluding AWV    Son Munoz MD, [facility]       Subjective     Priscilla OLGUIN Alexei is a 72 year old female who presents to clinic today for the following health issues:    HPI   Chief Complaint   Patient presents with     Wellness Visit     Also HGB checked     Recheck Medication      Feeling well overall  Mom passed away 2 mos ago, was able to spend last week with her.  "    Hyperlipidemia Follow-Up      Are you regularly taking any medication or supplement to lower your cholesterol?   Yes- statin    Are you having muscle aches or other side effects that you think could be caused by your cholesterol lowering medication?  No    Hypertension Follow-up      Do you check your blood pressure regularly outside of the clinic? Yes     Are you following a low salt diet? Yes    Are your blood pressures ever more than 140 on the top number (systolic) OR more   than 90 on the bottom number (diastolic), for example 140/90? No    Chronic/Recurring Back Pain Follow Up      Where is your back pain located? (Select all that apply) low back and bilat lateral hips    Since your last clinic visit for back pain, how has your pain changed? unchanged  No red flags        Objective    /66 (BP Location: Left arm, Patient Position: Chair, Cuff Size: Adult Regular)   Pulse 68   Temp 97.2  F (36.2  C) (Temporal)   Resp 20   Ht 1.588 m (5' 2.5\")   Wt 70.9 kg (156 lb 6.4 oz)   LMP  (LMP Unknown)   BMI 28.15 kg/m    Body mass index is 28.15 kg/m .  Alert, NAD  NC/AT  Sclerae anicteric  Regular  Resp nonlabored  Skin warm and dry  No focal neuro deficits. Speech intact.   Appropriate affect  Normal gait.    Labs reviewed.         "

## 2023-09-18 ENCOUNTER — APPOINTMENT (OUTPATIENT)
Dept: URBAN - METROPOLITAN AREA CLINIC 253 | Age: 73
Setting detail: DERMATOLOGY
End: 2023-09-22

## 2023-09-18 VITALS — RESPIRATION RATE: 14 BRPM | HEIGHT: 63 IN | WEIGHT: 157 LBS

## 2023-09-18 DIAGNOSIS — L82.1 OTHER SEBORRHEIC KERATOSIS: ICD-10-CM

## 2023-09-18 DIAGNOSIS — D18.0 HEMANGIOMA: ICD-10-CM

## 2023-09-18 DIAGNOSIS — D22 MELANOCYTIC NEVI: ICD-10-CM

## 2023-09-18 DIAGNOSIS — L81.4 OTHER MELANIN HYPERPIGMENTATION: ICD-10-CM

## 2023-09-18 DIAGNOSIS — Z71.89 OTHER SPECIFIED COUNSELING: ICD-10-CM

## 2023-09-18 PROBLEM — D22.5 MELANOCYTIC NEVI OF TRUNK: Status: ACTIVE | Noted: 2023-09-18

## 2023-09-18 PROBLEM — D18.01 HEMANGIOMA OF SKIN AND SUBCUTANEOUS TISSUE: Status: ACTIVE | Noted: 2023-09-18

## 2023-09-18 PROCEDURE — 99213 OFFICE O/P EST LOW 20 MIN: CPT

## 2023-09-18 PROCEDURE — OTHER MIPS QUALITY: OTHER

## 2023-09-18 PROCEDURE — OTHER COUNSELING: OTHER

## 2023-09-18 ASSESSMENT — LOCATION SIMPLE DESCRIPTION DERM
LOCATION SIMPLE: UPPER BACK
LOCATION SIMPLE: LOWER BACK

## 2023-09-18 ASSESSMENT — LOCATION DETAILED DESCRIPTION DERM
LOCATION DETAILED: SUPERIOR LUMBAR SPINE
LOCATION DETAILED: INFERIOR THORACIC SPINE

## 2023-09-18 ASSESSMENT — LOCATION ZONE DERM: LOCATION ZONE: TRUNK

## 2023-11-17 DIAGNOSIS — J45.30 MILD PERSISTENT ASTHMA WITHOUT COMPLICATION: ICD-10-CM

## 2023-11-17 RX ORDER — ALBUTEROL SULFATE 90 UG/1
2 AEROSOL, METERED RESPIRATORY (INHALATION) EVERY 6 HOURS
Qty: 18 G | Refills: 3 | Status: SHIPPED | OUTPATIENT
Start: 2023-11-17

## 2023-11-17 NOTE — TELEPHONE ENCOUNTER
Pt last seen 07/05/23 advised to return in 6 months.    Prisiclla Linda is requesting a refill of:    Pending Prescriptions:                       Disp   Refills    albuterol (PROAIR HFA/PROVENTIL HFA/MAYO*18 g   0            Sig: INHALE 2 PUFFS INTO THE LUNGS EVERY 6 HOURS

## 2023-12-21 DIAGNOSIS — J45.30 MILD PERSISTENT ASTHMA WITHOUT COMPLICATION: ICD-10-CM

## 2023-12-22 ENCOUNTER — MYC MEDICAL ADVICE (OUTPATIENT)
Dept: FAMILY MEDICINE | Facility: CLINIC | Age: 73
End: 2023-12-22

## 2023-12-22 RX ORDER — FLUTICASONE PROPIONATE 100 UG/1
1 POWDER, METERED RESPIRATORY (INHALATION)
COMMUNITY
Start: 2023-12-22

## 2023-12-22 NOTE — TELEPHONE ENCOUNTER
Priscilla Linda is requesting a refill of:    Refused Prescriptions:                       Disp   Refills    fluticasone (FLOVENT DISKUS) 100 MCG/ACT i*                Sig: INHALE 1 PUFF BY MOUTH every 12 hours  Refused By: GERARDO ARECHIGA  Reason for Refusal: Patient needs appointment    Pt due for OV

## 2023-12-30 DIAGNOSIS — I10 ESSENTIAL HYPERTENSION, BENIGN: ICD-10-CM

## 2024-01-02 RX ORDER — TRIAMTERENE/HYDROCHLOROTHIAZID 37.5-25 MG
1 TABLET ORAL DAILY
COMMUNITY
Start: 2024-01-02

## 2024-01-02 NOTE — TELEPHONE ENCOUNTER
Received incoming refill request for  Pending Prescriptions:                       Disp   Refills    triamterene-HCTZ (MAXZIDE-25) 37.5-25 MG *90 tab*0            Sig: TAKE ONE TABLET BY MOUTH ONE TIME DAILY    Patient is due and is scheduled on 1/8 where full refills will be sent.

## 2024-01-09 ENCOUNTER — OFFICE VISIT (OUTPATIENT)
Dept: FAMILY MEDICINE | Facility: CLINIC | Age: 74
End: 2024-01-09

## 2024-01-09 VITALS
RESPIRATION RATE: 22 BRPM | SYSTOLIC BLOOD PRESSURE: 112 MMHG | DIASTOLIC BLOOD PRESSURE: 72 MMHG | TEMPERATURE: 98.4 F | WEIGHT: 162 LBS | OXYGEN SATURATION: 97 % | BODY MASS INDEX: 29.16 KG/M2 | HEART RATE: 68 BPM

## 2024-01-09 DIAGNOSIS — I10 ESSENTIAL HYPERTENSION, BENIGN: Primary | ICD-10-CM

## 2024-01-09 DIAGNOSIS — I65.23 ASYMPTOMATIC BILATERAL CAROTID ARTERY STENOSIS: ICD-10-CM

## 2024-01-09 DIAGNOSIS — J45.30 MILD PERSISTENT ASTHMA WITHOUT COMPLICATION: ICD-10-CM

## 2024-01-09 DIAGNOSIS — D50.9 IRON DEFICIENCY ANEMIA, UNSPECIFIED IRON DEFICIENCY ANEMIA TYPE: ICD-10-CM

## 2024-01-09 LAB
BUN SERPL-MCNC: 22 MG/DL (ref 7–25)
BUN/CREATININE RATIO: 21.6 (ref 6–32)
CALCIUM SERPL-MCNC: 10.2 MG/DL (ref 8.6–10.3)
CHLORIDE SERPLBLD-SCNC: 103.3 MMOL/L (ref 98–110)
CO2 SERPL-SCNC: 30.3 MMOL/L (ref 20–32)
CREAT SERPL-MCNC: 1.02 MG/DL (ref 0.6–1.3)
ERYTHROCYTE [DISTWIDTH] IN BLOOD BY AUTOMATED COUNT: 12.1 %
GLUCOSE SERPL-MCNC: 91 MG/DL (ref 60–99)
HCT VFR BLD AUTO: 46.7 % (ref 35–47)
HEMOGLOBIN: 15.6 G/DL (ref 11.7–15.7)
MCH RBC QN AUTO: 31.3 PG (ref 26–33)
MCHC RBC AUTO-ENTMCNC: 33.4 G/DL (ref 31–36)
MCV RBC AUTO: 93.5 FL (ref 78–100)
PLATELET COUNT - QUEST: 327 10^9/L (ref 150–375)
POTASSIUM SERPL-SCNC: 4.2 MMOL/L (ref 3.5–5.3)
RBC # BLD AUTO: 4.99 10*12/L (ref 3.8–5.2)
SODIUM SERPL-SCNC: 143.4 MMOL/L (ref 135–146)
WBC # BLD AUTO: 8.1 10*9/L (ref 4–11)

## 2024-01-09 PROCEDURE — 36415 COLL VENOUS BLD VENIPUNCTURE: CPT | Performed by: STUDENT IN AN ORGANIZED HEALTH CARE EDUCATION/TRAINING PROGRAM

## 2024-01-09 PROCEDURE — 99214 OFFICE O/P EST MOD 30 MIN: CPT | Performed by: STUDENT IN AN ORGANIZED HEALTH CARE EDUCATION/TRAINING PROGRAM

## 2024-01-09 PROCEDURE — 85027 COMPLETE CBC AUTOMATED: CPT | Performed by: STUDENT IN AN ORGANIZED HEALTH CARE EDUCATION/TRAINING PROGRAM

## 2024-01-09 PROCEDURE — 80048 BASIC METABOLIC PNL TOTAL CA: CPT | Performed by: STUDENT IN AN ORGANIZED HEALTH CARE EDUCATION/TRAINING PROGRAM

## 2024-01-09 RX ORDER — TRIAMTERENE/HYDROCHLOROTHIAZID 37.5-25 MG
1 TABLET ORAL DAILY
Qty: 90 TABLET | Refills: 1 | Status: SHIPPED | OUTPATIENT
Start: 2024-01-09 | End: 2024-06-21

## 2024-01-09 ASSESSMENT — ASTHMA QUESTIONNAIRES: ACT_TOTALSCORE: 25

## 2024-01-09 NOTE — PROGRESS NOTES
Assessment & Plan       ICD-10-CM    1. Essential hypertension, benign  I10 triamterene-HCTZ (MAXZIDE-25) 37.5-25 MG tablet     Basic Metabolic Panel (BFP)      2. Mild persistent asthma without complication  J45.30 fluticasone (FLOVENT DISKUS) 100 MCG/ACT inhaler      3. Iron deficiency anemia, unspecified iron deficiency anemia type  D50.9 Hemogram Platelet (BFP)      4. Asymptomatic bilateral carotid artery stenosis  I65.23 US Carotid Bilateral     Radiology Referral         Labs today  No change to meds  Will get updated carotid US, asx.     Patient Instructions   Follow-up in 6 months     Reasons to follow-up sooner or seek emergent care reviewed.     >30 minutes spent on the date of the encounter doing chart review, history and exam, documentation and further activities per the note      Son Munoz MD, [facility]      Subjective     Priscilla SHARIF Alexei is a 73 year old female who presents to clinic today for the following health issues:    HPI   Chief Complaint   Patient presents with    Recheck Medication     Non fasting medication check and review for blood pressure, needs refill of triamterene hydrochlorothiazide, feels that blood pressure has been running better since starting to take the triamterene hydrochlorothiazide medication     Health Maintenance     Due for asthma action plan to be updated, patient feels that she is completely controlled with what she is currently using for asthma             Objective    /72 (BP Location: Right arm, Patient Position: Sitting, Cuff Size: Adult Large)   Pulse 68   Temp 98.4  F (36.9  C) (Temporal)   Resp 22   Wt 73.5 kg (162 lb)   LMP  (LMP Unknown)   SpO2 97%   BMI 29.16 kg/m    Body mass index is 29.16 kg/m .  Alert, NAD  NC/AT  Sclerae anicteric  Regular  Resp nonlabored  Skin warm and dry  No focal neuro deficits. Speech intact. Normal gait.  Appropriate affect    Labs reviewed.

## 2024-01-09 NOTE — NURSING NOTE
Chief Complaint   Patient presents with    Recheck Medication     Non fasting medication check and review for blood pressure, needs refill of triamterene hydrochlorothiazide, feels that blood pressure has been running better since starting to take the triamterene hydrochlorothiazide medication     Health Maintenance     Due for asthma action plan to be updated, patient feels that she is completely controlled with what she is currently using for asthma      Pre-visit Screening:  Immunizations:  up to date  Colonoscopy:  is up to date  Mammogram: is up to date  Asthma Action Test/Plan:  ACT given today   PHQ9:  PHQ2 done today   GAD7:  NA  Questioned patient about current smoking habits Pt. has never smoked.  Ok to leave detailed message on voice mail for today's visit only yes, phone # [phone] (home)

## 2024-01-10 ENCOUNTER — MYC MEDICAL ADVICE (OUTPATIENT)
Dept: FAMILY MEDICINE | Facility: CLINIC | Age: 74
End: 2024-01-10

## 2024-01-10 DIAGNOSIS — J30.9 ALLERGIC RHINITIS, UNSPECIFIED SEASONALITY, UNSPECIFIED TRIGGER: Primary | ICD-10-CM

## 2024-01-11 NOTE — TELEPHONE ENCOUNTER
Please review Jybe message, pt needs generic flovent sent in per insurance.    Priscilla Linda is requesting a refill of:    Pending Prescriptions:                       Disp   Refills    fluticasone (FLOVENT HFA) 110 MCG/ACT inh*36 g   1            Sig: Inhale 1 puff into the lungs 2 times daily

## 2024-01-12 RX ORDER — FLUTICASONE PROPIONATE 110 UG/1
1 AEROSOL, METERED RESPIRATORY (INHALATION) 2 TIMES DAILY
Qty: 36 G | Refills: 1 | Status: SHIPPED | OUTPATIENT
Start: 2024-01-12 | End: 2024-06-21

## 2024-01-23 ENCOUNTER — PATIENT OUTREACH (OUTPATIENT)
Dept: GASTROENTEROLOGY | Facility: CLINIC | Age: 74
End: 2024-01-23
Payer: MEDICARE

## 2024-02-16 ENCOUNTER — HOSPITAL ENCOUNTER (OUTPATIENT)
Dept: MAMMOGRAPHY | Facility: CLINIC | Age: 74
Discharge: HOME | End: 2024-02-16
Attending: STUDENT IN AN ORGANIZED HEALTH CARE EDUCATION/TRAINING PROGRAM | Admitting: STUDENT IN AN ORGANIZED HEALTH CARE EDUCATION/TRAINING PROGRAM
Payer: MEDICARE

## 2024-02-16 DIAGNOSIS — Z12.31 VISIT FOR SCREENING MAMMOGRAM: ICD-10-CM

## 2024-02-16 PROCEDURE — 77063 BREAST TOMOSYNTHESIS BI: CPT

## 2024-06-07 ENCOUNTER — OFFICE VISIT (OUTPATIENT)
Dept: URGENT CARE | Facility: URGENT CARE | Age: 74
End: 2024-06-07
Payer: MEDICARE

## 2024-06-07 VITALS
SYSTOLIC BLOOD PRESSURE: 115 MMHG | TEMPERATURE: 98.2 F | RESPIRATION RATE: 16 BRPM | OXYGEN SATURATION: 98 % | HEART RATE: 60 BPM | DIASTOLIC BLOOD PRESSURE: 73 MMHG

## 2024-06-07 DIAGNOSIS — H10.32 ACUTE CONJUNCTIVITIS OF LEFT EYE, UNSPECIFIED ACUTE CONJUNCTIVITIS TYPE: Primary | ICD-10-CM

## 2024-06-07 PROCEDURE — 99213 OFFICE O/P EST LOW 20 MIN: CPT | Performed by: FAMILY MEDICINE

## 2024-06-07 RX ORDER — POLYMYXIN B SULFATE AND TRIMETHOPRIM 1; 10000 MG/ML; [USP'U]/ML
1 SOLUTION OPHTHALMIC EVERY 6 HOURS
Qty: 10 ML | Refills: 0 | Status: SHIPPED | OUTPATIENT
Start: 2024-06-07 | End: 2024-06-12

## 2024-06-07 NOTE — PROGRESS NOTES
SUBJECTIVE:  Chief Complaint:   Chief Complaint   Patient presents with    Redness/discharge Of Eye     Today, left eye, swelling, irritation     History of Present Illness:  Priscilla Linda is a 73 year old female who presents complaining of swelling at the upper and lower left eyelids, pink discoloration at the left eye and watery discharge.  This morning the eyelids were stuck shut.   The eyelids are mildly sore but there is no itching.  No vision problems.     Contact wearer : none.      Past Medical History:   Diagnosis Date    Asthma     Ganglion cyst of wrist, left 10/10/2018    GERD (gastroesophageal reflux disease)     High cholesterol     Hypertension     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    Hx of Clomid       Current Outpatient Medications   Medication Sig Dispense Refill    albuterol (PROAIR HFA/PROVENTIL HFA/VENTOLIN HFA) 108 (90 Base) MCG/ACT inhaler INHALE 2 PUFFS INTO THE LUNGS EVERY 6 HOURS 18 g 3    atorvastatin (LIPITOR) 80 MG tablet Take 1 tablet (80 mg) by mouth daily 90 tablet 3    fluticasone (FLOVENT DISKUS) 100 MCG/ACT inhaler Inhale 1 puff into the lungs every 12 hours 180 each 3    fluticasone (FLOVENT HFA) 110 MCG/ACT inhaler Inhale 1 puff into the lungs 2 times daily 36 g 1    metoprolol tartrate (LOPRESSOR) 25 MG tablet Take 1 tablet (25 mg) by mouth 2 times daily 180 tablet 3    MULTI-VITAMIN/IRON OR TABS 1 TABLET DAILY      omeprazole (PRILOSEC) 20 MG DR capsule Take 1 capsule (20 mg) by mouth daily 90 capsule 3    triamterene-HCTZ (MAXZIDE-25) 37.5-25 MG tablet Take 1 tablet by mouth daily 90 tablet 1        ROS:  CONSTITUTIONAL:negative for fevers.    EYES:  positive for eye discharge and redness.      OBJECTIVE:  /73   Pulse 60   Temp 98.2  F (36.8  C)   Resp 16   LMP  (LMP Unknown)   SpO2 98%   General: no acute distress  Eye exam: left eye abnormal findings: conjunctivitis with erythema.  The upper and lower eyelids are mildly edematous with faint redness.       ASSESSMENT:  Acute Conjunctivitis of the left eye.      PLAN:  See orders in epic    Rx:  Polytrim Ophthalmic Solution     Follow up if not better in one week.      Abdulaziz Gibbons MD

## 2024-06-13 DIAGNOSIS — K21.9 GASTROESOPHAGEAL REFLUX DISEASE, UNSPECIFIED WHETHER ESOPHAGITIS PRESENT: ICD-10-CM

## 2024-06-13 NOTE — TELEPHONE ENCOUNTER
Priscilla Linda is requesting a refill of:    Refused Prescriptions:                       Disp   Refills    omeprazole (PRILOSEC) 20 MG DR capsule [Ph*                Sig: TAKE ONE CAPSULE BY MOUTH ONE TIME DAILY  Refused By: GERARDO ARECHIGA  Reason for Refusal: Patient needs appointment    Pt due for FASTING OV

## 2024-06-21 ENCOUNTER — OFFICE VISIT (OUTPATIENT)
Dept: FAMILY MEDICINE | Facility: CLINIC | Age: 74
End: 2024-06-21

## 2024-06-21 VITALS
DIASTOLIC BLOOD PRESSURE: 72 MMHG | HEART RATE: 63 BPM | OXYGEN SATURATION: 94 % | WEIGHT: 164 LBS | BODY MASS INDEX: 29.52 KG/M2 | SYSTOLIC BLOOD PRESSURE: 126 MMHG | TEMPERATURE: 97.6 F

## 2024-06-21 DIAGNOSIS — J45.30 MILD PERSISTENT ASTHMA WITHOUT COMPLICATION: Primary | ICD-10-CM

## 2024-06-21 DIAGNOSIS — K21.9 GASTROESOPHAGEAL REFLUX DISEASE, UNSPECIFIED WHETHER ESOPHAGITIS PRESENT: ICD-10-CM

## 2024-06-21 DIAGNOSIS — J30.9 ALLERGIC RHINITIS, UNSPECIFIED SEASONALITY, UNSPECIFIED TRIGGER: ICD-10-CM

## 2024-06-21 DIAGNOSIS — I65.22 ASYMPTOMATIC STENOSIS OF LEFT CAROTID ARTERY: ICD-10-CM

## 2024-06-21 DIAGNOSIS — I10 ESSENTIAL HYPERTENSION, BENIGN: ICD-10-CM

## 2024-06-21 DIAGNOSIS — E83.52 SERUM CALCIUM ELEVATED: ICD-10-CM

## 2024-06-21 DIAGNOSIS — E78.2 MIXED HYPERLIPIDEMIA: ICD-10-CM

## 2024-06-21 DIAGNOSIS — R06.09 DYSPNEA ON EXERTION: ICD-10-CM

## 2024-06-21 DIAGNOSIS — B00.1 RECURRENT COLD SORES: ICD-10-CM

## 2024-06-21 DIAGNOSIS — D50.9 IRON DEFICIENCY ANEMIA, UNSPECIFIED IRON DEFICIENCY ANEMIA TYPE: ICD-10-CM

## 2024-06-21 LAB
ALBUMIN SERPL-MCNC: 4.5 G/DL (ref 3.6–5.1)
ALP SERPL-CCNC: 89 U/L (ref 33–130)
ALT 1742-6: 25 U/L (ref 0–32)
AST 1920-8: 27 U/L (ref 0–35)
BILIRUB SERPL-MCNC: 1.1 MG/DL (ref 0.2–1.2)
BUN SERPL-MCNC: 20 MG/DL (ref 7–25)
BUN/CREATININE RATIO: 19 (ref 6–32)
CALCIUM SERPL-MCNC: 10.6 MG/DL (ref 8.6–10.3)
CHLORIDE SERPLBLD-SCNC: 103.5 MMOL/L (ref 98–110)
CO2 SERPL-SCNC: 29.2 MMOL/L (ref 20–32)
CREAT SERPL-MCNC: 1.08 MG/DL (ref 0.6–1.3)
ERYTHROCYTE [DISTWIDTH] IN BLOOD BY AUTOMATED COUNT: 11.8 %
GLUCOSE SERPL-MCNC: 102 MG/DL (ref 60–99)
HCT VFR BLD AUTO: 47.4 % (ref 35–47)
HEMOGLOBIN: 15.6 G/DL (ref 11.7–15.7)
MCH RBC QN AUTO: 30.8 PG (ref 26–33)
MCHC RBC AUTO-ENTMCNC: 32.9 G/DL (ref 31–36)
MCV RBC AUTO: 93.7 FL (ref 78–100)
PLATELET COUNT - QUEST: 313 10^9/L (ref 150–375)
POTASSIUM SERPL-SCNC: 4.03 MMOL/L (ref 3.5–5.3)
PROT SERPL-MCNC: 7 G/DL (ref 6.1–8.1)
RBC # BLD AUTO: 5.06 10*12/L (ref 3.8–5.2)
SODIUM SERPL-SCNC: 142.3 MMOL/L (ref 135–146)
WBC # BLD AUTO: 7.2 10*9/L (ref 4–11)

## 2024-06-21 PROCEDURE — 85027 COMPLETE CBC AUTOMATED: CPT | Performed by: STUDENT IN AN ORGANIZED HEALTH CARE EDUCATION/TRAINING PROGRAM

## 2024-06-21 PROCEDURE — 99215 OFFICE O/P EST HI 40 MIN: CPT | Performed by: STUDENT IN AN ORGANIZED HEALTH CARE EDUCATION/TRAINING PROGRAM

## 2024-06-21 PROCEDURE — G2211 COMPLEX E/M VISIT ADD ON: HCPCS | Performed by: STUDENT IN AN ORGANIZED HEALTH CARE EDUCATION/TRAINING PROGRAM

## 2024-06-21 PROCEDURE — 80053 COMPREHEN METABOLIC PANEL: CPT | Performed by: STUDENT IN AN ORGANIZED HEALTH CARE EDUCATION/TRAINING PROGRAM

## 2024-06-21 PROCEDURE — 36415 COLL VENOUS BLD VENIPUNCTURE: CPT | Performed by: STUDENT IN AN ORGANIZED HEALTH CARE EDUCATION/TRAINING PROGRAM

## 2024-06-21 RX ORDER — FLUTICASONE PROPIONATE 110 UG/1
1 AEROSOL, METERED RESPIRATORY (INHALATION) 2 TIMES DAILY
Qty: 36 G | Refills: 1 | Status: CANCELLED | OUTPATIENT
Start: 2024-06-21

## 2024-06-21 RX ORDER — VALACYCLOVIR HYDROCHLORIDE 1 G/1
2000 TABLET, FILM COATED ORAL 2 TIMES DAILY
Qty: 4 TABLET | Refills: 3 | Status: SHIPPED | OUTPATIENT
Start: 2024-06-21 | End: 2024-06-22

## 2024-06-21 RX ORDER — TRIAMTERENE/HYDROCHLOROTHIAZID 37.5-25 MG
1 TABLET ORAL DAILY
Qty: 90 TABLET | Refills: 1 | Status: SHIPPED | OUTPATIENT
Start: 2024-06-21

## 2024-06-21 RX ORDER — ACYCLOVIR 200 MG/1
200 CAPSULE ORAL PRN
COMMUNITY
End: 2024-06-21

## 2024-06-21 RX ORDER — FLUTICASONE PROPIONATE 110 UG/1
1 AEROSOL, METERED RESPIRATORY (INHALATION) 2 TIMES DAILY
Qty: 36 G | Refills: 1 | Status: SHIPPED | OUTPATIENT
Start: 2024-06-21

## 2024-06-21 RX ORDER — METOPROLOL TARTRATE 25 MG/1
25 TABLET, FILM COATED ORAL 2 TIMES DAILY
Qty: 180 TABLET | Refills: 3 | Status: SHIPPED | OUTPATIENT
Start: 2024-06-21

## 2024-06-21 RX ORDER — ACYCLOVIR 200 MG/1
200 CAPSULE ORAL PRN
Status: CANCELLED | OUTPATIENT
Start: 2024-06-21

## 2024-06-21 RX ORDER — ATORVASTATIN CALCIUM 80 MG/1
80 TABLET, FILM COATED ORAL DAILY
Qty: 90 TABLET | Refills: 3 | Status: SHIPPED | OUTPATIENT
Start: 2024-06-21

## 2024-06-21 ASSESSMENT — ASTHMA QUESTIONNAIRES: ACT_TOTALSCORE: 20

## 2024-06-21 NOTE — NURSING NOTE
Chief Complaint   Patient presents with    Recheck Medication     Pre-visit Screening:  Immunizations:  up to date - rsv and covid at pharmacy  Colonoscopy:  is up to date  Mammogram: is up to date  Asthma Action Test/Plan:  given today  PHQ9:  na  GAD7:  na  Questioned patient about current smoking habits Pt. has never smoked.  Ok to leave detailed message on voice mail for today's visit only yes, phone # [phone] (home)

## 2024-06-21 NOTE — PROGRESS NOTES
Assessment & Plan       ICD-10-CM    1. Mild persistent asthma without complication  J45.30       2. Asymptomatic stenosis of left carotid artery  I65.22 atorvastatin (LIPITOR) 80 MG tablet      3. Allergic rhinitis, unspecified seasonality, unspecified trigger  J30.9 fluticasone (FLOVENT HFA) 110 MCG/ACT inhaler      4. Essential hypertension, benign  I10 metoprolol tartrate (LOPRESSOR) 25 MG tablet     triamterene-HCTZ (MAXZIDE-25) 37.5-25 MG tablet     Comprehensive Metobolic Panel (BFP)      5. Gastroesophageal reflux disease, unspecified whether esophagitis present  K21.9 omeprazole (PRILOSEC) 20 MG DR capsule      6. Mixed hyperlipidemia  E78.2 Comprehensive Metobolic Panel (BFP)      7. Recurrent cold sores  B00.1 valACYclovir (VALTREX) 1000 mg tablet      8. Iron deficiency anemia, unspecified iron deficiency anemia type  D50.9 Hemogram Platelet (BFP)      9. Dyspnea on exertion  R06.09 Adult Cardiology Eval  Referral - To [facility] Location      10. Serum calcium elevated  E83.52 PTH, Intact and Calcium ([facility])     TSH WITH FREE T4 REFLEX ([facility])         Blood work today  Switch to valtrex otherwise no med changes   Worsening GEORGE pt reports distinct from asthma sx - similar sx 2022 resolved with anemia tx and CBC today wnl, recommend cardiac eval  Routine follow-up in 6 mo     Reasons to follow-up sooner or seek emergent care reviewed.     44 minutes spent on the date of the encounter doing chart review, history and exam, documentation and further activities per the note      Son Munoz MD, [facility]      Subjective     Priscilla OLGUIN Alexei is a 73 year old female who presents to clinic today for the following health issues:    HPI   Chief Complaint   Patient presents with    Recheck Medication     Medication check and review, fasting      One year anniversary of mom's passing in May, spent time with family    dx CIDP - getting IVIG infusions   Does  feel more GEORGE and occasional chest heaviness, never pain. Fairly predictable with activity. No LH/presyncope. Has not had any period of inactivity to explain having less endurance. Feels somewhat similar to sx in 2022 but had resolved with iron replacement. Stress echo negative at that time.     Hyperlipidemia/ASCVD Follow-Up  Feels well, no concerns. Carotid US January 2024 reviewed.   Are you regularly taking any medication or supplement to lower your cholesterol?   Yes statin  Are you having muscle aches or other side effects that you think could be caused by your cholesterol lowering medication?  No    Hypertension Follow-up  Do you check your blood pressure regularly outside of the clinic? Yes   Are you following a low salt diet? Yes  Are your blood pressures ever more than 140 on the top number (systolic) OR more than 90 on the bottom number (diastolic), for example 140/90? No    Asthma Follow-Up  Doing well, had to get different fluticasone due to insurance lack of coverage  Asthma Follow-Up    Was ACT completed today?  Yes        6/21/2024     9:57 AM   ACT Total Scores   ACT TOTAL SCORE (Goal Greater than or Equal to 20) 20   In the past 12 months, how many times did you visit the emergency room for your asthma without being admitted to the hospital? 0   In the past 12 months, how many times were you hospitalized overnight because of your asthma? 0                Objective    /72 (BP Location: Left arm, Patient Position: Sitting, Cuff Size: Adult Large)   Pulse 63   Temp 97.6  F (36.4  C) (Temporal)   Wt 74.4 kg (164 lb)   LMP  (LMP Unknown)   SpO2 94%   BMI 29.52 kg/m    Body mass index is 29.52 kg/m .  Alert, NAD  NC/AT  Sclerae anicteric  Neck supple  RRR, no edema  Resp nonlabored  Skin warm and dry  No focal neuro deficits. Speech intact. Normal gait.  Appropriate affect       Labs reviewed.  Results for orders placed or performed in visit on 06/21/24   Hemogram Platelet (BFP)     Status:  None   Result Value Ref Range    WBC 7.2 4.0 - 11 10*9/L    RBC Count 5.06 3.8 - 5.2 10*12/L    Hemoglobin 15.6 11.7 - 15.7 g/dL    Hematocrit 47.4 35.0 - 47.0 %    MCV 93.7 78 - 100 fL    MCH 30.8 26 - 33 pg    MCHC 32.9 31 - 36 g/dL    RDW 11.8 %    Platelet Count 313 150 - 375 10^9/L   Comprehensive Metobolic Panel (BFP)     Status: Abnormal   Result Value Ref Range    Carbon Dioxide 29.2 20 - 32 mmol/L    Creatinine 1.08 0.60 - 1.30 mg/dL    Glucose 102 (A) 60 - 99 mg/dL    Sodium 142.3 135 - 146 mmol/L    Potassium 4.03 3.5 - 5.3 mmol/L    Chloride 103.5 98 - 110 mmol/L    Protein Total 7.0 6.1 - 8.1 g/dL    Albumin 4.5 3.6 - 5.1 g/dL    Alkaline Phosphatase 89 33 - 130 U/L    ALT 25 0 - 32 U/L    AST 27 0 - 35 U/L    Bilirubin Total 1.1 0.2 - 1.2 mg/dL    Urea Nitrogen 20 7 - 25 mg/dL    Calcium 10.6 (A) 8.6 - 10.3 mg/dL    BUN/Creatinine Ratio 19 6 - 32

## 2024-06-21 NOTE — PATIENT INSTRUCTIONS
Blood work today    Consider Cardiology consult vs repeating stress test if labs unrevealing    Routine follow-up in 6 mo

## 2024-06-22 LAB — TSH SERPL-ACNC: 1.15 MIU/L (ref 0.4–4.5)

## 2024-06-25 LAB
CALCIUM SERPL-MCNC: 10.5 MG/DL (ref 8.6–10.4)
PTH, INTACT: 33 PG/ML (ref 16–77)

## 2024-06-27 ENCOUNTER — OFFICE VISIT (OUTPATIENT)
Dept: CARDIOLOGY | Facility: CLINIC | Age: 74
End: 2024-06-27
Attending: STUDENT IN AN ORGANIZED HEALTH CARE EDUCATION/TRAINING PROGRAM
Payer: MEDICARE

## 2024-06-27 VITALS
DIASTOLIC BLOOD PRESSURE: 78 MMHG | HEIGHT: 63 IN | HEART RATE: 64 BPM | WEIGHT: 164 LBS | SYSTOLIC BLOOD PRESSURE: 120 MMHG | BODY MASS INDEX: 29.06 KG/M2

## 2024-06-27 DIAGNOSIS — R06.09 DYSPNEA ON EXERTION: Primary | ICD-10-CM

## 2024-06-27 DIAGNOSIS — I49.3 PVC'S (PREMATURE VENTRICULAR CONTRACTIONS): ICD-10-CM

## 2024-06-27 DIAGNOSIS — Z82.49 FAMILY HISTORY OF ISCHEMIC HEART DISEASE: ICD-10-CM

## 2024-06-27 PROCEDURE — 99205 OFFICE O/P NEW HI 60 MIN: CPT | Performed by: INTERNAL MEDICINE

## 2024-06-27 PROCEDURE — G2211 COMPLEX E/M VISIT ADD ON: HCPCS | Performed by: INTERNAL MEDICINE

## 2024-06-27 PROCEDURE — 93000 ELECTROCARDIOGRAM COMPLETE: CPT | Performed by: INTERNAL MEDICINE

## 2024-06-27 RX ORDER — CHOLECALCIFEROL (VITAMIN D3) 50 MCG
1 TABLET ORAL DAILY
COMMUNITY

## 2024-06-27 NOTE — LETTER
6/27/2024    RAMONA DALEY MD  [address]  [facility] [address]    RE: Priscilla Linda       Dear Colleague,     I had the pleasure of seeing Priscilla Linda in the [facility].    SERVICE DATE: June 27, 2024      PRIMARY CARE AND REFERRING PROVIDER:  Ramona Daley  [address]  [facility] [address]    REASON FOR VISIT:  Dyspnea on exertion.    HISTORY OF PRESENT ILLNESS:  Priscilla Linda is 73 year old female, new to cardiology, known to have well-controlled hypertension, hyperlipidemia on statin therapy, chronic right bundle branch block, mild stable asthma, remote history of anemia that has been corrected, never tobacco user, mild obstructive sleep apnea intolerant of CPAP, regular exerciser, BMI 29.    Patient is being referred for dyspnea on exertion over the last 2 months.  She goes to [employer] daily, works out for an hour and is able to do that but feels like over the last 2 months, she is short of breath and fatigued just with accustomed exercises.  No chest pain, lower extremity edema.  She has even tried taking her bronchodilator inhaler prior to exercise and feels it has made no difference.  She gets occasional skipped heartbeat palpitations consistent with her known history of occasional PVCs.  She has daytime somnolence in the context of not using her CPAP due to intolerance, for the last 2 years. She had similar symptoms back in 2022 and underwent an extensive workup by her primary care team which included an exercise echocardiogram negative for ischemia, normal ventricular function, no valve disease, average exercise capacity of 7.0 METS.  Heart monitor showed sinus rhythm, 3.7% or occasional premature ventricular complexes with symptoms correlating with PVCs.  In 2022, she was noted to be mildly anemic with a hemoglobin of 11.3 with negative workup.  Iron was supplemented, and this improved and for the last 2 years she  has not been anemic and her most recent hemoglobin is 15.6.  No tobacco use.    She says that her father  of cardiac causes at age 72 years and she is 73 this year and that is weighing on her.  Her sister who had diabetes for 60 years,  in her sleep in her 70s.  Cause of death was not determined.    BP well-controlled at 120/78, pulse 64 bpm.  Cardiac auscultation is normal with regular heart sounds, no murmur, normal JVP, lungs are clear, no lower extremity edema.  ECG done today shows sinus rhythm of 64 bpm, normal cardiac intervals, chronic right bundle branch block which is unchanged from her prior EKG dated 10/26/2022. Pertinent labs show normal renal panel with a creatinine of 1.0, potassium 4.0, normal TSH, LDL 95, hypertriglyceridemia of 162, normal CBC with a hemoglobin of 15.6.    DIAGNOSES/ASSESSMENT:  Episodic dyspnea on exertion for which she underwent a workup in  but it has recurred despite patient exercising every day and being physically active.  Will get an up-to-date workup including echocardiogram, treadmill nuclear stress test.  Palpitations suggestive of PVCs.  Already on a beta-blocker.  Had 3.7% PVCs in .  Get up-to-date heart monitor for quantification.  CPAP intolerant sleep apnea.  Advised her to seek referral to sleep clinic via PCP to explore non-CPAP options.  She says she will think about this.  Possible seasonal allergy.  Trial of over-the-counter antihistamines for a couple of weeks is worthwhile.  Stable asthma.  She is already noted that taking inhaled bronchodilators does not make her symptoms better.  Hypertension.  Well-controlled on current therapy.  Chronic right bundle branch block which she has had for many years.  Not contributing to symptoms.    PLAN:  All historical testing reviewed in detail with the patient.  Questions answered.  Transthoracic echocardiogram.  3-day Zio patch heart monitor.  Treadmill nuclear stress test.  Hold beta-blocker for 24 hours  "prior.  Follow-up with cardiology MARYANN.        Flavio Conte MD      New patient.  60 minutes spent by me on the date of the encounter doing chart review, history and exam, documentation and further activities per the note      The longitudinal plan of care for the condition(s) below were addressed during this visit. Due to the added complexity in care, I will continue to support Scooter in the subsequent management of this condition(s) and with the ongoing continuity of care of this condition(s).    Problem List Items Addressed This Visit as of 6/27/2024   Dyspnea on exertion.       This note was completed in part using dictation via the Dragon voice recognition software. Some word and grammatical errors may occur and must be interpreted in the appropriate clinical context. If there are any questions pertaining to this issue, please contact me for further clarification.       Vitals: /78 (BP Location: Right arm)   Pulse 64   Ht 1.588 m (5' 2.5\")   Wt 74.4 kg (164 lb)   LMP  (LMP Unknown)   BMI 29.52 kg/m    Wt Readings from Last 5 Encounters:   06/27/24 74.4 kg (164 lb)   06/21/24 74.4 kg (164 lb)   01/09/24 73.5 kg (162 lb)   07/05/23 70.9 kg (156 lb 6.4 oz)   04/27/23 72.7 kg (160 lb 3.2 oz)         Orders Placed This Encounter   Procedures    Follow-Up with Cardiology MARYANN    EKG 12-lead complete w/read - Clinics (performed today)    Echocardiogram Complete           CURRENT MEDICATIONS:  Current Outpatient Medications   Medication Sig Dispense Refill    albuterol (PROAIR HFA/PROVENTIL HFA/VENTOLIN HFA) 108 (90 Base) MCG/ACT inhaler INHALE 2 PUFFS INTO THE LUNGS EVERY 6 HOURS 18 g 3    atorvastatin (LIPITOR) 80 MG tablet Take 1 tablet (80 mg) by mouth daily 90 tablet 3    fluticasone (FLOVENT HFA) 110 MCG/ACT inhaler Inhale 1 puff into the lungs 2 times daily 36 g 1    metoprolol tartrate (LOPRESSOR) 25 MG tablet Take 1 tablet (25 mg) by mouth 2 times daily 180 tablet 3    MULTI-VITAMIN/IRON OR TABS " 1 TABLET DAILY      omeprazole (PRILOSEC) 20 MG DR capsule Take 1 capsule (20 mg) by mouth daily 90 capsule 3    triamterene-HCTZ (MAXZIDE-25) 37.5-25 MG tablet Take 1 tablet by mouth daily 90 tablet 1    vitamin D3 (CHOLECALCIFEROL) 50 mcg (2000 units) tablet Take 1 tablet by mouth daily      valACYclovir (VALTREX) 1000 mg tablet Take 2 tablets (2,000 mg) by mouth 2 times daily for 1 day per episode 4 tablet 3     ALLERGIES:  No Known Allergies        Thank you for allowing me to participate in the care of your patient.      Sincerely,     Flavio Conte MD     [facility]  cc:   Son Munoz MD  [address]  [place] [address]

## 2024-06-27 NOTE — PROGRESS NOTES
SERVICE DATE: 2024      PRIMARY CARE AND REFERRING PROVIDER:  Son Munoz  [address]  [facility] [address]    REASON FOR VISIT:  Dyspnea on exertion.    HISTORY OF PRESENT ILLNESS:  Priscilla Linda is 73 year old female, new to cardiology, known to have well-controlled hypertension, hyperlipidemia on statin therapy, chronic right bundle branch block, mild stable asthma, remote history of anemia that has been corrected, never tobacco user, mild obstructive sleep apnea intolerant of CPAP, regular exerciser, BMI 29.    Patient is being referred for dyspnea on exertion over the last 2 months.  She goes to [employer] daily, works out for an hour and is able to do that but feels like over the last 2 months, she is short of breath and fatigued just with accustomed exercises.  No chest pain, lower extremity edema.  She has even tried taking her bronchodilator inhaler prior to exercise and feels it has made no difference.  She gets occasional skipped heartbeat palpitations consistent with her known history of occasional PVCs.  She has daytime somnolence in the context of not using her CPAP due to intolerance, for the last 2 years. She had similar symptoms back in  and underwent an extensive workup by her primary care team which included an exercise echocardiogram negative for ischemia, normal ventricular function, no valve disease, average exercise capacity of 7.0 METS.  Heart monitor showed sinus rhythm, 3.7% or occasional premature ventricular complexes with symptoms correlating with PVCs.  In , she was noted to be mildly anemic with a hemoglobin of 11.3 with negative workup.  Iron was supplemented, and this improved and for the last 2 years she has not been anemic and her most recent hemoglobin is 15.6.  No tobacco use.    She says that her father  of cardiac causes at age 72 years and she is 73 this year and that is weighing on her.  Her sister who had diabetes for  60 years,  in her sleep in her 70s.  Cause of death was not determined.    BP well-controlled at 120/78, pulse 64 bpm.  Cardiac auscultation is normal with regular heart sounds, no murmur, normal JVP, lungs are clear, no lower extremity edema.  ECG done today shows sinus rhythm of 64 bpm, normal cardiac intervals, chronic right bundle branch block which is unchanged from her prior EKG dated 10/26/2022. Pertinent labs show normal renal panel with a creatinine of 1.0, potassium 4.0, normal TSH, LDL 95, hypertriglyceridemia of 162, normal CBC with a hemoglobin of 15.6.    DIAGNOSES/ASSESSMENT:  Episodic dyspnea on exertion for which she underwent a workup in  but it has recurred despite patient exercising every day and being physically active.  Will get an up-to-date workup including echocardiogram, treadmill nuclear stress test.  Palpitations suggestive of PVCs.  Already on a beta-blocker.  Had 3.7% PVCs in .  Get up-to-date heart monitor for quantification.  CPAP intolerant sleep apnea.  Advised her to seek referral to sleep clinic via PCP to explore non-CPAP options.  She says she will think about this.  Possible seasonal allergy.  Trial of over-the-counter antihistamines for a couple of weeks is worthwhile.  Stable asthma.  She is already noted that taking inhaled bronchodilators does not make her symptoms better.  Hypertension.  Well-controlled on current therapy.  Chronic right bundle branch block which she has had for many years.  Not contributing to symptoms.    PLAN:  All historical testing reviewed in detail with the patient.  Questions answered.  Transthoracic echocardiogram.  3-day Zio patch heart monitor.  Treadmill nuclear stress test.  Hold beta-blocker for 24 hours prior.  Follow-up with cardiology MARYANN.        Flavio Conte MD      New patient.  60 minutes spent by me on the date of the encounter doing chart review, history and exam, documentation and further activities per the  "note      The longitudinal plan of care for the condition(s) below were addressed during this visit. Due to the added complexity in care, I will continue to support Scooter in the subsequent management of this condition(s) and with the ongoing continuity of care of this condition(s).    Problem List Items Addressed This Visit as of 6/27/2024   Dyspnea on exertion.       This note was completed in part using dictation via the Dragon voice recognition software. Some word and grammatical errors may occur and must be interpreted in the appropriate clinical context. If there are any questions pertaining to this issue, please contact me for further clarification.       Vitals: /78 (BP Location: Right arm)   Pulse 64   Ht 1.588 m (5' 2.5\")   Wt 74.4 kg (164 lb)   LMP  (LMP Unknown)   BMI 29.52 kg/m    Wt Readings from Last 5 Encounters:   06/27/24 74.4 kg (164 lb)   06/21/24 74.4 kg (164 lb)   01/09/24 73.5 kg (162 lb)   07/05/23 70.9 kg (156 lb 6.4 oz)   04/27/23 72.7 kg (160 lb 3.2 oz)         Orders Placed This Encounter   Procedures    Follow-Up with Cardiology MARYANN    EKG 12-lead complete w/read - Clinics (performed today)    Echocardiogram Complete           CURRENT MEDICATIONS:  Current Outpatient Medications   Medication Sig Dispense Refill    albuterol (PROAIR HFA/PROVENTIL HFA/VENTOLIN HFA) 108 (90 Base) MCG/ACT inhaler INHALE 2 PUFFS INTO THE LUNGS EVERY 6 HOURS 18 g 3    atorvastatin (LIPITOR) 80 MG tablet Take 1 tablet (80 mg) by mouth daily 90 tablet 3    fluticasone (FLOVENT HFA) 110 MCG/ACT inhaler Inhale 1 puff into the lungs 2 times daily 36 g 1    metoprolol tartrate (LOPRESSOR) 25 MG tablet Take 1 tablet (25 mg) by mouth 2 times daily 180 tablet 3    MULTI-VITAMIN/IRON OR TABS 1 TABLET DAILY      omeprazole (PRILOSEC) 20 MG DR capsule Take 1 capsule (20 mg) by mouth daily 90 capsule 3    triamterene-HCTZ (MAXZIDE-25) 37.5-25 MG tablet Take 1 tablet by mouth daily 90 tablet 1    vitamin D3 " (CHOLECALCIFEROL) 50 mcg (2000 units) tablet Take 1 tablet by mouth daily      valACYclovir (VALTREX) 1000 mg tablet Take 2 tablets (2,000 mg) by mouth 2 times daily for 1 day per episode 4 tablet 3         ALLERGIES:  No Known Allergies

## 2024-07-10 ENCOUNTER — TELEPHONE (OUTPATIENT)
Dept: CARDIOLOGY | Facility: CLINIC | Age: 74
End: 2024-07-10
Payer: MEDICARE

## 2024-07-10 NOTE — TELEPHONE ENCOUNTER
Called patient with update that the zio unit was sent out [employer] yesterday. She should see it by tomorrow. Reviewed that it has a return  that should be sent through the post office. Patient verbalized understanding.

## 2024-07-10 NOTE — TELEPHONE ENCOUNTER
[facility] Call Center    Phone Message    May a detailed message be left on voicemail: yes     Reason for Call: Other: Pt would like a call back asap as she never received her monitor and it was to be mailed out June 27th      Action Taken: Other: Cardio    Travel Screening: Not Applicable     Date of Service:

## 2024-07-11 ENCOUNTER — APPOINTMENT (OUTPATIENT)
Dept: GENERAL RADIOLOGY | Facility: CLINIC | Age: 74
End: 2024-07-11
Attending: EMERGENCY MEDICINE
Payer: MEDICARE

## 2024-07-11 ENCOUNTER — APPOINTMENT (OUTPATIENT)
Dept: MRI IMAGING | Facility: CLINIC | Age: 74
End: 2024-07-11
Attending: EMERGENCY MEDICINE
Payer: MEDICARE

## 2024-07-11 ENCOUNTER — ORDERS ONLY (AUTO-RELEASED) (OUTPATIENT)
Dept: CARDIOLOGY | Facility: CLINIC | Age: 74
End: 2024-07-11
Payer: MEDICARE

## 2024-07-11 ENCOUNTER — HOSPITAL ENCOUNTER (EMERGENCY)
Facility: CLINIC | Age: 74
Discharge: HOME | End: 2024-07-11
Attending: EMERGENCY MEDICINE | Admitting: EMERGENCY MEDICINE
Payer: MEDICARE

## 2024-07-11 VITALS
DIASTOLIC BLOOD PRESSURE: 85 MMHG | SYSTOLIC BLOOD PRESSURE: 124 MMHG | HEART RATE: 79 BPM | RESPIRATION RATE: 20 BRPM | OXYGEN SATURATION: 90 %

## 2024-07-11 DIAGNOSIS — R26.81 UNSTEADINESS: ICD-10-CM

## 2024-07-11 DIAGNOSIS — R06.09 DYSPNEA ON EXERTION: ICD-10-CM

## 2024-07-11 DIAGNOSIS — I49.3 PVC'S (PREMATURE VENTRICULAR CONTRACTIONS): ICD-10-CM

## 2024-07-11 DIAGNOSIS — R11.2 NAUSEA AND VOMITING, UNSPECIFIED VOMITING TYPE: ICD-10-CM

## 2024-07-11 LAB
ANION GAP SERPL CALCULATED.3IONS-SCNC: 14 MMOL/L (ref 7–15)
APTT PPP: 23 SECONDS (ref 22–38)
ATRIAL RATE - MUSE: 80 BPM
BASOPHILS # BLD AUTO: 0 10E3/UL (ref 0–0.2)
BASOPHILS NFR BLD AUTO: 0 %
BUN SERPL-MCNC: 23.1 MG/DL (ref 8–23)
CALCIUM SERPL-MCNC: 9.8 MG/DL (ref 8.8–10.2)
CHLORIDE SERPL-SCNC: 105 MMOL/L (ref 98–107)
CREAT SERPL-MCNC: 0.9 MG/DL (ref 0.51–0.95)
D DIMER PPP FEU-MCNC: 0.4 UG/ML FEU (ref 0–0.5)
DEPRECATED HCO3 PLAS-SCNC: 25 MMOL/L (ref 22–29)
DIASTOLIC BLOOD PRESSURE - MUSE: NORMAL MMHG
EGFRCR SERPLBLD CKD-EPI 2021: 68 ML/MIN/1.73M2
EOSINOPHIL # BLD AUTO: 0.1 10E3/UL (ref 0–0.7)
EOSINOPHIL NFR BLD AUTO: 1 %
ERYTHROCYTE [DISTWIDTH] IN BLOOD BY AUTOMATED COUNT: 12.5 % (ref 10–15)
GLUCOSE SERPL-MCNC: 117 MG/DL (ref 70–99)
HCT VFR BLD AUTO: 44.2 % (ref 35–47)
HGB BLD-MCNC: 15 G/DL (ref 11.7–15.7)
IMM GRANULOCYTES # BLD: 0 10E3/UL
IMM GRANULOCYTES NFR BLD: 0 %
INR PPP: 1 (ref 0.85–1.15)
INTERPRETATION ECG - MUSE: NORMAL
LYMPHOCYTES # BLD AUTO: 0.8 10E3/UL (ref 0.8–5.3)
LYMPHOCYTES NFR BLD AUTO: 8 %
MCH RBC QN AUTO: 30.4 PG (ref 26.5–33)
MCHC RBC AUTO-ENTMCNC: 33.9 G/DL (ref 31.5–36.5)
MCV RBC AUTO: 90 FL (ref 78–100)
MONOCYTES # BLD AUTO: 0.4 10E3/UL (ref 0–1.3)
MONOCYTES NFR BLD AUTO: 4 %
NEUTROPHILS # BLD AUTO: 9 10E3/UL (ref 1.6–8.3)
NEUTROPHILS NFR BLD AUTO: 87 %
NRBC # BLD AUTO: 0 10E3/UL
NRBC BLD AUTO-RTO: 0 /100
P AXIS - MUSE: 38 DEGREES
PLATELET # BLD AUTO: 255 10E3/UL (ref 150–450)
POTASSIUM SERPL-SCNC: 3.7 MMOL/L (ref 3.4–5.3)
PR INTERVAL - MUSE: 152 MS
QRS DURATION - MUSE: 148 MS
QT - MUSE: 438 MS
QTC - MUSE: 505 MS
R AXIS - MUSE: -13 DEGREES
RBC # BLD AUTO: 4.94 10E6/UL (ref 3.8–5.2)
SODIUM SERPL-SCNC: 144 MMOL/L (ref 135–145)
SYSTOLIC BLOOD PRESSURE - MUSE: NORMAL MMHG
T AXIS - MUSE: 9 DEGREES
TROPONIN T SERPL HS-MCNC: 8 NG/L
TROPONIN T SERPL HS-MCNC: 8 NG/L
VENTRICULAR RATE- MUSE: 80 BPM
WBC # BLD AUTO: 10.3 10E3/UL (ref 4–11)

## 2024-07-11 PROCEDURE — 85610 PROTHROMBIN TIME: CPT | Performed by: EMERGENCY MEDICINE

## 2024-07-11 PROCEDURE — 85025 COMPLETE CBC W/AUTO DIFF WBC: CPT | Performed by: EMERGENCY MEDICINE

## 2024-07-11 PROCEDURE — 255N000002 HC RX 255 OP 636: Performed by: EMERGENCY MEDICINE

## 2024-07-11 PROCEDURE — 85379 FIBRIN DEGRADATION QUANT: CPT | Performed by: EMERGENCY MEDICINE

## 2024-07-11 PROCEDURE — 70553 MRI BRAIN STEM W/O & W/DYE: CPT | Mod: MA

## 2024-07-11 PROCEDURE — 250N000011 HC RX IP 250 OP 636: Performed by: EMERGENCY MEDICINE

## 2024-07-11 PROCEDURE — 71046 X-RAY EXAM CHEST 2 VIEWS: CPT

## 2024-07-11 PROCEDURE — 70549 MR ANGIOGRAPH NECK W/O&W/DYE: CPT | Mod: MA

## 2024-07-11 PROCEDURE — 80048 BASIC METABOLIC PNL TOTAL CA: CPT | Performed by: EMERGENCY MEDICINE

## 2024-07-11 PROCEDURE — 36415 COLL VENOUS BLD VENIPUNCTURE: CPT | Performed by: EMERGENCY MEDICINE

## 2024-07-11 PROCEDURE — 84484 ASSAY OF TROPONIN QUANT: CPT | Performed by: EMERGENCY MEDICINE

## 2024-07-11 PROCEDURE — 93005 ELECTROCARDIOGRAM TRACING: CPT

## 2024-07-11 PROCEDURE — A9585 GADOBUTROL INJECTION: HCPCS | Performed by: EMERGENCY MEDICINE

## 2024-07-11 PROCEDURE — 99285 EMERGENCY DEPT VISIT HI MDM: CPT | Mod: 25

## 2024-07-11 PROCEDURE — 96375 TX/PRO/DX INJ NEW DRUG ADDON: CPT

## 2024-07-11 PROCEDURE — 96374 THER/PROPH/DIAG INJ IV PUSH: CPT | Mod: 59

## 2024-07-11 PROCEDURE — 85730 THROMBOPLASTIN TIME PARTIAL: CPT | Performed by: EMERGENCY MEDICINE

## 2024-07-11 PROCEDURE — 70544 MR ANGIOGRAPHY HEAD W/O DYE: CPT | Mod: MA

## 2024-07-11 RX ORDER — GADOBUTROL 604.72 MG/ML
10 INJECTION INTRAVENOUS ONCE
Status: COMPLETED | OUTPATIENT
Start: 2024-07-11 | End: 2024-07-11

## 2024-07-11 RX ORDER — ONDANSETRON 4 MG/1
4 TABLET, ORALLY DISINTEGRATING ORAL EVERY 8 HOURS PRN
Qty: 10 TABLET | Refills: 0 | Status: SHIPPED | OUTPATIENT
Start: 2024-07-11

## 2024-07-11 RX ORDER — LORAZEPAM 2 MG/ML
0.5 INJECTION INTRAMUSCULAR ONCE
Status: COMPLETED | OUTPATIENT
Start: 2024-07-11 | End: 2024-07-11

## 2024-07-11 RX ORDER — ONDANSETRON 2 MG/ML
4 INJECTION INTRAMUSCULAR; INTRAVENOUS ONCE
Status: DISCONTINUED | OUTPATIENT
Start: 2024-07-11 | End: 2024-07-11

## 2024-07-11 RX ADMIN — GADOBUTROL 10 ML: 604.72 INJECTION INTRAVENOUS at 08:46

## 2024-07-11 RX ADMIN — PROCHLORPERAZINE EDISYLATE 5 MG: 5 INJECTION INTRAMUSCULAR; INTRAVENOUS at 07:06

## 2024-07-11 RX ADMIN — LORAZEPAM 0.5 MG: 2 INJECTION INTRAMUSCULAR; INTRAVENOUS at 08:38

## 2024-07-11 ASSESSMENT — ACTIVITIES OF DAILY LIVING (ADL)
ADLS_ACUITY_SCORE: 38

## 2024-07-11 ASSESSMENT — COLUMBIA-SUICIDE SEVERITY RATING SCALE - C-SSRS
1. IN THE PAST MONTH, HAVE YOU WISHED YOU WERE DEAD OR WISHED YOU COULD GO TO SLEEP AND NOT WAKE UP?: NO
2. HAVE YOU ACTUALLY HAD ANY THOUGHTS OF KILLING YOURSELF IN THE PAST MONTH?: NO
6. HAVE YOU EVER DONE ANYTHING, STARTED TO DO ANYTHING, OR PREPARED TO DO ANYTHING TO END YOUR LIFE?: NO

## 2024-07-11 NOTE — ED TRIAGE NOTES
"BIBA from home for acute onset N/V at 0600.felt \"off balance\" when walking to bathroom. Increased SOB over 1 month increased today. RBBB identified per EMS. Stress tested scheduled for this morning. 18PIV L arm 4 mg zofran given. . Patient VSS pale and diaphoretic.        "

## 2024-07-11 NOTE — DISCHARGE INSTRUCTIONS
What do you do next:   Continue your home medications unless we have specifically changed them  If medications were prescribed today, take these as directed.  You can use over-the-counter acetaminophen (Tylenol ) for fever or pain control as applicable to your visit today.  Acetaminophen (Tylenol): Take 500 to 1000 mg by mouth every 6 hours as needed for fever or pain.  Do not take more than 4000 total milligrams of acetaminophen-containing products in a 24-hour timeframe.  Follow up as indicated below    When do you return: Review your discharge papers for specifics on reasons to return.    Thank you for allowing us to care for you today.

## 2024-07-11 NOTE — ED PROVIDER NOTES
Emergency Department Note      Code Status: Prior    History of Present Illness     Chief Complaint:  Nausea & Vomiting       HPI   Priscilla Linda is a 73 year old female who presents via EMS for evaluation of nausea and vomiting. The patient reports last night around 2130, when she was going to bed, she felt a little off but can't explain exactly how. She notes that she stopped her metoprolol in preparation for a nuclear stress test, here, at 0745 today. This morning when waking up around 5749-2714 she states that she felt fine. Then when she was preparing for the morning, while walking around she experienced sudden onset of lightheadedness and difficulty walking. At the time she felt like she was going to fall over. She then took her morning medication and inhaler, sat down, and then developed nausea and vomited. Afterward she called EMS. During evaluation the patient endorses increased shortness of breath and shakiness. She notes her shortness of breath being similar to what she has been experiencing for the past 2 months. She is currently receiving work up  from her cardiologist, Dr. Conte for this. She states that if she were to stand up, her symptoms of lightheadedness would most likely return. She denies her episode of lightheadedness being similar to vertigo she has experienced in the past.    Independent Historian:    None    Review of External Notes  Reviewed cardiology note from 6/27/24, increased dyspnea on exertion for two months, plan is for ECG, nuclear stress test, and pacemaker placement.    Past Medical History   Medical History, Surgical History, Problem List, and Medications  Reviewed in Epic    Physical Exam   Patient Vitals for the past 24 hrs:   BP Pulse Resp SpO2   07/11/24 1030 124/85 79 -- --   07/11/24 1020 125/83 74 -- 90 %   07/11/24 1010 -- 80 -- (!) 88 %   07/11/24 1000 130/76 80 -- 93 %   07/11/24 0952 -- -- -- 97 %   07/11/24 0840 -- -- -- 99 %   07/11/24 0839 -- -- -- 99 %    07/11/24 0838 -- -- -- 99 %   07/11/24 0641 (!) 173/53 94 20 100 %       Physical Exam  Constitutional: Vital signs reviewed as above.   Eyes: PEERL, EOMI B/L  Neck: No JVD noted. FROM   Cardiovascular: normal rate, Regular rhythm and normal heart sounds.  No murmur heard. Equal B/L peripheral pulses.  Pulmonary/Chest: Effort normal and breath sounds normal. No respiratory distress. Patient has no wheezes. Patient has no rales.   Gastrointestinal: Soft. There is no tenderness.   Musculoskeletal/Extremities: No pitting edema noted. Normal tone.  Neurological:    No nystagmus noted  Patient is alert and oriented to person, place, and time.    Speech is fluent, cognition is normal.   CN 2-12 intact (PERRL, EOMI, symmetric smile, equal eye squeeze and forehead raise, normal and equal sensation to bilateral forehead/cheek/chin, grossly equal hearing B/L, midline tongue protrusion with nl side-to-side movement, normal shoulder shrug).    RUE strength 5/5: , finger abd, wrist flex/ext, elbow flex/ext.    LUE strength 5/5: , finger abd, wrist flex/ext, elbow flex/ext.    RLE strength 5/5: ankle flex/ext, knee flex/ext, hip flex.    LLE strength 5/5: ankle flex/ext, knee flex/ext, hip flex.    Sensation equal in all 4 extremities.    No arm drift.     Cerebellar: Normal rapid pronation/supination and hand rolling.  Normal bilateral heel-to-shin testing.  There was normal right upper extremity finger-to-nose testing, though there was slight difficulty with left-sided finger-to-nose testing.    Normal heel-to-shin  Skin: Skin is warm and dry. There is no diaphoresis noted.   Psychiatric: The patient appears calm.     Diagnostics     Laboratory: Imaging:   Labs Ordered and Resulted from Time of ED Arrival to Time of ED Departure   BASIC METABOLIC PANEL - Abnormal       Result Value    Sodium 144      Potassium 3.7      Chloride 105      Carbon Dioxide (CO2) 25      Anion Gap 14      Urea Nitrogen 23.1 (*)      Creatinine 0.90      GFR Estimate 68      Calcium 9.8      Glucose 117 (*)    CBC WITH PLATELETS AND DIFFERENTIAL - Abnormal    WBC Count 10.3      RBC Count 4.94      Hemoglobin 15.0      Hematocrit 44.2      MCV 90      MCH 30.4      MCHC 33.9      RDW 12.5      Platelet Count 255      % Neutrophils 87      % Lymphocytes 8      % Monocytes 4      % Eosinophils 1      % Basophils 0      % Immature Granulocytes 0      NRBCs per 100 WBC 0      Absolute Neutrophils 9.0 (*)     Absolute Lymphocytes 0.8      Absolute Monocytes 0.4      Absolute Eosinophils 0.1      Absolute Basophils 0.0      Absolute Immature Granulocytes 0.0      Absolute NRBCs 0.0     INR - Normal    INR 1.00     PARTIAL THROMBOPLASTIN TIME - Normal    aPTT 23     TROPONIN T, HIGH SENSITIVITY - Normal    Troponin T, High Sensitivity 8     D DIMER QUANTITATIVE - Normal    D-Dimer Quantitative 0.40     TROPONIN T, HIGH SENSITIVITY - Normal    Troponin T, High Sensitivity 8     GLUCOSE MONITOR NURSING POCT     Chest XR,  PA & LAT   Final Result   IMPRESSION: No acute cardiopulmonary process.      RUSS REILLY MD            SYSTEM ID:  [id number]      MRA Brain ([place]) wo Contrast   Final Result   Impression:   1. No evidence of acute infarction or intracranial hemorrhage. Chronic   small vessel ischemic disease.   2. No abnormal enhancing lesions intracranially.   3. Head MRA demonstrates patent major intracranial arteries with no   definite aneurysm or stenosis.   4. Neck MRA demonstrates patent major cervical arteries. No   significant stenosis or no evidence of dissection             YANI SEO MD            SYSTEM ID:  [id number]      MRA Neck (Carotids) wo & w Contrast   Final Result   Impression:   1. No evidence of acute infarction or intracranial hemorrhage. Chronic   small vessel ischemic disease.   2. No abnormal enhancing lesions intracranially.   3. Head MRA demonstrates patent major intracranial arteries with no   definite  aneurysm or stenosis.   4. Neck MRA demonstrates patent major cervical arteries. No   significant stenosis or no evidence of dissection             YANI SEO MD            SYSTEM ID:  [id number]      MR Brain w/o & w Contrast   Final Result   Impression:   1. No evidence of acute infarction or intracranial hemorrhage. Chronic   small vessel ischemic disease.   2. No abnormal enhancing lesions intracranially.   3. Head MRA demonstrates patent major intracranial arteries with no   definite aneurysm or stenosis.   4. Neck MRA demonstrates patent major cervical arteries. No   significant stenosis or no evidence of dissection             YANI SEO MD            SYSTEM ID:  [id number]            EKG   ECG taken at 0641, ECG read at 0646  Normal sinus rhythm  Right bundle branch block   No significant change as compared to prior, dated 6/27/24.  Rate 80 bpm. HI interval 152 ms. QRS duration 148 ms. QT/QTc 438/505 ms. P-R-T axes 38 -13 9.    Independent Interpretation  See ED course    ED Course    Medications Administered  Medications   prochlorperazine (COMPAZINE) injection 5 mg (5 mg Intravenous $Given 7/11/24 0706)   gadobutrol (GADAVIST) injection 10 mL (10 mLs Intravenous $Given 7/11/24 0846)   sodium chloride (PF) 0.9% PF flush 60 mL (60 mLs Intravenous $Given 7/11/24 0846)   LORazepam (ATIVAN) injection 0.5 mg (0.5 mg Intravenous $Given 7/11/24 0838)       Procedures  Procedures     Discussion of Management  See ED Course    ED Course  ED Course as of 07/11/24 1338   Thu Jul 11, 2024   0626 I obtained history and performed physical exam as noted above.    1221 Rechecked and updated. Feels much better. Tolerated oral challenge. Has been ambulatory as well.       Optional/Additional Documentation: None    Medical Decision Making / Diagnosis     MIPS     None    Medical Decision Making:  This 73 year old female presents to the ED due to Nausea & Vomiting  . Please see the HPI and exam for specifics. A broad  differential was considered including, but not limited to, benign vertigo, electrolyte dysfunction, anemia, stroke, ACS, etc.    Based on the differential, exam, and any decision tools, the above workup was undertaken. Lab and/or imaging results were reviewed by me and are notable for overall reassuring labs including troponin and D-dimer.  Chest x-ray does not reveal any acute infiltrate.  There is no stroke noted on MRI..    Management of these findings included medications as above    Based on this, I felt that the most likely etiology of their symptoms is possibly some degree of vertigo though the patient does not describe a classic spinning sensation.  Due to the patient's age, meclizine was not prescribed.  I do not suspect ACS at this point though the patient did miss her cardiac stress test appointment today due to her symptoms.  I think that discharge is safe though I will encourage outpatient follow-up for rescheduling of her stress test and return with any new or worsening symptoms.    Anticipatory guidance given to patient and family prior to discharge.     Critical Care:  None.    Disposition:  See ED Course and MDM    ICD-10 Codes:    ICD-10-CM    1. Nausea and vomiting, unspecified vomiting type  R11.2       2. Unsteadiness  R26.81            Discharge Medications:  Discharge Medication List as of 7/11/2024 12:49 PM        START taking these medications    Details   ondansetron (ZOFRAN ODT) 4 MG ODT tab Take 1 tablet (4 mg) by mouth every 8 hours as needed for nausea, Disp-10 tablet, R-0, E-Prescribe            Scribe Disclosure:  Attila OLEA, am serving as a scribe at 7:10 AM on 7/11/2024 to document services personally performed by David Murrieta DO based on my observations and the provider's statements to me.    7/11/2024   David Murrieta DO     [employer]                    David Murrieta DO  07/11/24 4647

## 2024-07-19 ENCOUNTER — HOSPITAL ENCOUNTER (OUTPATIENT)
Dept: CARDIOLOGY | Facility: CLINIC | Age: 74
Discharge: HOME | End: 2024-07-19
Attending: INTERNAL MEDICINE
Payer: MEDICARE

## 2024-07-19 ENCOUNTER — HOSPITAL ENCOUNTER (OUTPATIENT)
Dept: NUCLEAR MEDICINE | Facility: CLINIC | Age: 74
Setting detail: NUCLEAR MEDICINE
Discharge: HOME | End: 2024-07-19
Attending: INTERNAL MEDICINE
Payer: MEDICARE

## 2024-07-19 ENCOUNTER — HOSPITAL ENCOUNTER (OUTPATIENT)
Dept: NUCLEAR MEDICINE | Facility: CLINIC | Age: 74
Setting detail: NUCLEAR MEDICINE
Discharge: HOME | End: 2024-07-19
Attending: INTERNAL MEDICINE | Admitting: INTERNAL MEDICINE
Payer: MEDICARE

## 2024-07-19 DIAGNOSIS — I49.3 PVC'S (PREMATURE VENTRICULAR CONTRACTIONS): ICD-10-CM

## 2024-07-19 DIAGNOSIS — R06.09 DYSPNEA ON EXERTION: ICD-10-CM

## 2024-07-19 LAB — STRESS ECHO TARGET HR: 148

## 2024-07-19 PROCEDURE — A9500 TC99M SESTAMIBI: HCPCS | Performed by: INTERNAL MEDICINE

## 2024-07-19 PROCEDURE — 78451 HT MUSCLE IMAGE SPECT SING: CPT | Mod: 26 | Performed by: INTERNAL MEDICINE

## 2024-07-19 PROCEDURE — 343N000001 HC RX 343: Performed by: INTERNAL MEDICINE

## 2024-07-19 PROCEDURE — 78451 HT MUSCLE IMAGE SPECT SING: CPT | Mod: ME

## 2024-07-19 PROCEDURE — G1010 CDSM STANSON: HCPCS | Performed by: INTERNAL MEDICINE

## 2024-07-19 RX ADMIN — Medication 10 MILLICURIE: at 08:58

## 2024-07-22 ENCOUNTER — TELEPHONE (OUTPATIENT)
Dept: CARDIOLOGY | Facility: CLINIC | Age: 74
End: 2024-07-22
Payer: MEDICARE

## 2024-07-22 DIAGNOSIS — R00.2 PALPITATIONS: ICD-10-CM

## 2024-07-22 DIAGNOSIS — R06.09 DYSPNEA ON EXERTION: Primary | ICD-10-CM

## 2024-07-22 NOTE — TELEPHONE ENCOUNTER
Message from nuclear reader:  Elena Johansen, RN  P [facility] Heart Team 2  Hi Team 2,  Dr. Aburto wanted to make sure your team is aware that patient's nuclear stress test had to be canceled because there was too much gut uptake/interference causing normalization error on rest images.  Test would have been equivocal.    [facility] Heart Team 2  Call from [facility] department stating Bharati was not completed today due to the fact that the images were unreadable and per Dr Aburto they stopped the test.  Patient will need to have a different type of stress test ordered.    ============  Stress test on 7/19/2024 was canceled.  Echo is scheduled on 8/20/2024    Per Dr. Conte's dictation 6/27/2024:  All historical testing reviewed in detail with the patient.  Questions answered.  Transthoracic echocardiogram.  3-day Zio patch heart monitor.  Treadmill nuclear stress test.  Hold beta-blocker for 24 hours prior.  Follow-up with cardiology MARYANN.    Will message Dr. Conte to review for alternative testing

## 2024-07-23 ENCOUNTER — MYC MEDICAL ADVICE (OUTPATIENT)
Dept: CARDIOLOGY | Facility: CLINIC | Age: 74
End: 2024-07-23
Payer: MEDICARE

## 2024-07-23 ENCOUNTER — TELEPHONE (OUTPATIENT)
Dept: CARDIOLOGY | Facility: CLINIC | Age: 74
End: 2024-07-23
Payer: MEDICARE

## 2024-07-23 NOTE — TELEPHONE ENCOUNTER
Nuclear medicine notified team yesterday and awaiting Dr. Conte's response in separate telephone encounter.

## 2024-07-23 NOTE — TELEPHONE ENCOUNTER
1st attempt- Left voicemail for the patient to call back and schedule the following:    Appointment type:  Testing only- No clinic visit- stress echo  Provider:  Dr Conte  Return date:  ~8/20/24      Specialty phone number: [phone]   MK

## 2024-07-23 NOTE — TELEPHONE ENCOUNTER
Reply from Dr. Conte:  Flavio Conte MD Anderson, Beatrice RAMIREZ RN  Cc: JULIETTE [facility] Heart Team 2  Caller: Unspecified (Yesterday, 10:25 AM)  Cancel echocardiogram.  Please order stress echocardiogram instead.  Hold beta-blocker 24 hours prior.  Follow-up with MARYANN. Thanks.    SJ  ========  Patient to see MARYANN Steph Aguilera on 9/12/2024    1330 called patient with Dr. Conte's recommendation to change to a stress echo. Reviewed to hold her lopressor for 24 hours pre-procedure.    Message sent to scheduling to change patient from echo on 8/20/2024 to stress echo prior to OV on 9/12/2024.

## 2024-07-24 ENCOUNTER — TELEPHONE (OUTPATIENT)
Dept: CARDIOLOGY | Facility: CLINIC | Age: 74
End: 2024-07-24

## 2024-07-24 PROCEDURE — 93244 EXT ECG>48HR<7D REV&INTERPJ: CPT | Performed by: INTERNAL MEDICINE

## 2024-07-24 NOTE — TELEPHONE ENCOUNTER
Please see zio patch results ordered at apt 6/27/24 for palpitations suggestive of PVCs and hx of 3.7% PVCs in 2022. Already on beta blocker. Follow up apt scheduled 9/12/24 with Steph Aguilera.    Narrative & Impression  Agree with findings  Symptoms reported were mostly related to sinus rhythm  No arrhythmias of significance were found.      min HR of 48 bpm, max HR of 120 bpm, and avg HR of 67 bpm.   Predominant underlying rhythm was Sinus Rhythm.   Intermittent Bundle Branch Block was present.   3 Supraventricular Tachycardia runs occurred, the run with the fastest interval lasting 10 beats with a max rate of 120 bpm (avg 105 bpm); the run with the fastest interval was also the longest. Supraventricular Tachycardia was detected within +/- 45 seconds of symptomatic patient event(s).    Isolated SVEs were rare (<1.0%)  SVE Couplets were rare (<1.0%), SVE Triplets were rare (<1.0%). Isolated VEs were rare (<1.0%), and no VE Couplets or VE Triplets were present.

## 2024-07-29 NOTE — TELEPHONE ENCOUNTER
Flavio Conte MD Ashenmacher, Megan, RN; JULIETTE [facility] Heart Team 2  Caller: Unspecified (5 days ago,  1:53 PM)  Symptoms correlated with sinus rhythm.  Nothing urgent.  Follow-up as scheduled.    Dr. Conte  --------------------------------------    Patient called and VM left informing patient that Dr. Conte has reviewed the results of the zio patch and informed of above interpretation. Reminded of follow up apt scheduled at 0830 on 9/12/24 with Steph Aguilera. Instructed to return call to team 2 nurse line with any questions or concerns.

## 2024-07-31 ENCOUNTER — HOSPITAL ENCOUNTER (OUTPATIENT)
Dept: CARDIOLOGY | Facility: CLINIC | Age: 74
Discharge: HOME | End: 2024-07-31
Attending: INTERNAL MEDICINE | Admitting: INTERNAL MEDICINE
Payer: MEDICARE

## 2024-07-31 DIAGNOSIS — R00.2 PALPITATIONS: ICD-10-CM

## 2024-07-31 DIAGNOSIS — R06.09 DYSPNEA ON EXERTION: ICD-10-CM

## 2024-07-31 PROCEDURE — 255N000002 HC RX 255 OP 636: Performed by: INTERNAL MEDICINE

## 2024-07-31 PROCEDURE — 93018 CV STRESS TEST I&R ONLY: CPT | Performed by: INTERNAL MEDICINE

## 2024-07-31 PROCEDURE — 93321 DOPPLER ECHO F-UP/LMTD STD: CPT | Mod: 26 | Performed by: INTERNAL MEDICINE

## 2024-07-31 PROCEDURE — 999N000208 ECHO STRESS ECHOCARDIOGRAM

## 2024-07-31 PROCEDURE — 93350 STRESS TTE ONLY: CPT | Mod: 26 | Performed by: INTERNAL MEDICINE

## 2024-07-31 PROCEDURE — 93016 CV STRESS TEST SUPVJ ONLY: CPT | Performed by: INTERNAL MEDICINE

## 2024-07-31 PROCEDURE — 93325 DOPPLER ECHO COLOR FLOW MAPG: CPT | Mod: 26 | Performed by: INTERNAL MEDICINE

## 2024-07-31 RX ADMIN — HUMAN ALBUMIN MICROSPHERES AND PERFLUTREN 3 ML: 10; .22 INJECTION, SOLUTION INTRAVENOUS at 14:34

## 2024-08-10 ENCOUNTER — HEALTH MAINTENANCE LETTER (OUTPATIENT)
Age: 74
End: 2024-08-10

## 2024-08-31 ENCOUNTER — HOSPITAL ENCOUNTER (EMERGENCY)
Facility: CLINIC | Age: 74
Discharge: HOME | End: 2024-08-31
Attending: STUDENT IN AN ORGANIZED HEALTH CARE EDUCATION/TRAINING PROGRAM | Admitting: STUDENT IN AN ORGANIZED HEALTH CARE EDUCATION/TRAINING PROGRAM
Payer: MEDICARE

## 2024-08-31 VITALS
OXYGEN SATURATION: 98 % | WEIGHT: 165.12 LBS | HEIGHT: 63 IN | HEART RATE: 69 BPM | TEMPERATURE: 98.5 F | BODY MASS INDEX: 29.26 KG/M2 | RESPIRATION RATE: 18 BRPM | SYSTOLIC BLOOD PRESSURE: 144 MMHG | DIASTOLIC BLOOD PRESSURE: 97 MMHG
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
## 2024-08-31 DIAGNOSIS — S41.152A DOG BITE OF LEFT ARM, INITIAL ENCOUNTER: ICD-10-CM

## 2024-08-31 PROCEDURE — 99283 EMERGENCY DEPT VISIT LOW MDM: CPT

## 2024-08-31 ASSESSMENT — COLUMBIA-SUICIDE SEVERITY RATING SCALE - C-SSRS
6. HAVE YOU EVER DONE ANYTHING, STARTED TO DO ANYTHING, OR PREPARED TO DO ANYTHING TO END YOUR LIFE?: NO
2. HAVE YOU ACTUALLY HAD ANY THOUGHTS OF KILLING YOURSELF IN THE PAST MONTH?: NO
1. IN THE PAST MONTH, HAVE YOU WISHED YOU WERE DEAD OR WISHED YOU COULD GO TO SLEEP AND NOT WAKE UP?: NO

## 2024-08-31 ASSESSMENT — ACTIVITIES OF DAILY LIVING (ADL): ADLS_ACUITY_SCORE: 36

## 2024-08-31 NOTE — ED TRIAGE NOTES
Arrives with dog bite to the left forearm about 1 hour pta. Bleeding has stopped.      Triage Assessment (Adult)       Row Name 08/31/24 1355          Triage Assessment    Airway WDL WDL        Respiratory WDL    Respiratory WDL WDL        Peripheral/Neurovascular WDL    Peripheral Neurovascular WDL WDL                     
3.285

## 2024-08-31 NOTE — DISCHARGE INSTRUCTIONS
Leave bandage in place for 24 hours  After this you may gently clean the wound once daily with soap/water  Take antibiotic as prescribed  Follow up next week for wound recheck.  Return to the ED should you develop redness around the wound, streaking up the arm, fevers, chills, wound drainage, or any further concerns.  Hope you feel better soon!

## 2024-08-31 NOTE — ED PROVIDER NOTES
Emergency Department Note      History of Present Illness     Chief Complaint   Dog Bite      HPI   Priscilla Linda is a 74 year old female who presents to the emergency department for dog bite of her left arm.  Patient reports she was on a walk earlier today when a stranger's dog came up and bit her.  She was able to exchange information with the dog's owner and learned that the dog is fully up-to-date on vaccines including rabies.  Patient is unsure of her own tetanus status but reports being otherwise healthy.  She is not diabetic or anticoagulated.  Reports minimal bleeding and mild pain to the arm.  No numbness or tingling.  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       Medications   amoxicillin-clavulanate (AUGMENTIN) 875-125 MG tablet  albuterol (PROAIR HFA/PROVENTIL HFA/VENTOLIN HFA) 108 (90 Base) MCG/ACT inhaler  atorvastatin (LIPITOR) 80 MG tablet  fluticasone (FLOVENT HFA) 110 MCG/ACT inhaler  metoprolol tartrate (LOPRESSOR) 25 MG tablet  MULTI-VITAMIN/IRON OR TABS  omeprazole (PRILOSEC) 20 MG DR capsule  ondansetron (ZOFRAN ODT) 4 MG ODT tab  triamterene-HCTZ (MAXZIDE-25) 37.5-25 MG tablet  valACYclovir (VALTREX) 1000 mg tablet  vitamin D3 (CHOLECALCIFEROL) 50 mcg (2000 units) tablet      Surgical History   Past Surgical History:   Procedure Laterality Date    CATARACT IOL, RT/LT  2007    Right and Left    COLONOSCOPY  06/2013    polyps, repeat in 3 years    COLONOSCOPY  08/2016    tub adenoma/ repeat 5 years    FOOT SURGERY Right 10/2018    fusion     FOOT SURGERY Left 2020    TUBAL LIGATION  1983    UPPER GI ENDOSCOPY  2003    hiatal hernia    ZZC NONSPECIFIC  "PROCEDURE  1999    ganglion cyst    [facility] TOTAL ABDOM HYSTERECTOMY  1995    prolapse, marshal chay/ BSO    ZZHC COLONOSCOPY THRU STOMA, DIAGNOSTIC  2003    hx of iron defic/ two normal colonoscopies       Physical Exam     Patient Vitals for the past 24 hrs:   BP Temp Temp src Pulse Resp SpO2 Height Weight   08/31/24 1356 (!) 144/97 98.5  F (36.9  C) Temporal 69 18 98 % 1.6 m (5' 3\") 74.9 kg (165 lb 2 oz)     Physical Exam  Vital signs and nursing notes reviewed    General: Alert, appropriate  Cardiovascular: Appears well perfused. Intact radial pulse bilaterally  Pulmonary: No respiratory distress  Musculoskeletal: Tendon function normal with full ROM of fingers and wrist of left upper extremity  Neuro: Normal strength and distal sensation to left upper extremity  Skin: Two 1 cm puncture wounds, no more than 1 cm deep to left dorsal forearm. minimal surrounding tenderness to palpation. No crepitus, erythema, purulence, fluctuance. No foreign body. No elbow or wrist joint involvement or edema    Diagnostics     Lab Results   Labs Ordered and Resulted from Time of ED Arrival to Time of ED Departure - No data to display    Imaging   No orders to display     Independent Interpretation   None    ED Course      Medications Administered   Medications - No data to display    Procedures   Procedures     Discussion of Management   None    ED Course   ED Course as of 08/31/24 1530   Sat Aug 31, 2024   1525 [facility] tdap 2021     Additional Documentation  None    Medical Decision Making / Diagnosis     CMS Diagnoses: None    MIPS       None    MDM   Priscilla Linda is a 74 year old female who presents to the Emergency Department with dog bite to the left arm. See HPI. Vital signs normal. On exam, patient has two puncture wounds to the left dorsal forearm. The wound was irrigated and explored. There is no evidence at this time of associated fracture or foreign body, deep space infection, tendon injury, or neurovascular " injury. Patient declines XR, low suspicion for foreign body or bony injury based on clinical exam. The wounds will be let open due to concerns for infection and she will be started on Augmentin. She is not diabetic or anticoagulated. Using reasonable clinical judgement, she is safe for discharge home. Indications for immediate return to ER were reviewed and included but are not limited to, redness, fevers, drainage, increasing pain, or other concerns. Patient agreeable to plan and had questions answered. Tetanus up to date    Disposition   The patient was discharged.     Diagnosis     ICD-10-CM    1. Dog bite of left arm, initial encounter  S41.152A     W54.0XXA          Discharge Medications   Discharge Medication List as of 8/31/2024  3:27 PM        START taking these medications    Details   amoxicillin-clavulanate (AUGMENTIN) 875-125 MG tablet Take 1 tablet by mouth 2 times daily for 7 days., Disp-14 tablet, R-0, E-Prescribe           Agatha Becker PA-C on 8/31/2024 at 3:38 PM       Agatha Becker PA-C  08/31/24 1538

## 2024-09-12 ENCOUNTER — OFFICE VISIT (OUTPATIENT)
Dept: CARDIOLOGY | Facility: CLINIC | Age: 74
End: 2024-09-12
Attending: INTERNAL MEDICINE
Payer: MEDICARE

## 2024-09-12 VITALS
HEART RATE: 60 BPM | SYSTOLIC BLOOD PRESSURE: 136 MMHG | BODY MASS INDEX: 28.9 KG/M2 | WEIGHT: 163.1 LBS | OXYGEN SATURATION: 98 % | DIASTOLIC BLOOD PRESSURE: 78 MMHG | HEIGHT: 63 IN

## 2024-09-12 DIAGNOSIS — E78.2 MIXED HYPERLIPIDEMIA: ICD-10-CM

## 2024-09-12 DIAGNOSIS — I49.3 PVC'S (PREMATURE VENTRICULAR CONTRACTIONS): ICD-10-CM

## 2024-09-12 DIAGNOSIS — R06.09 DYSPNEA ON EXERTION: Primary | ICD-10-CM

## 2024-09-12 DIAGNOSIS — I77.9 BILATERAL CAROTID ARTERY DISEASE, UNSPECIFIED TYPE (H): ICD-10-CM

## 2024-09-12 DIAGNOSIS — G47.33 OSA (OBSTRUCTIVE SLEEP APNEA): ICD-10-CM

## 2024-09-12 PROCEDURE — 99214 OFFICE O/P EST MOD 30 MIN: CPT | Performed by: NURSE PRACTITIONER

## 2024-09-12 NOTE — PATIENT INSTRUCTIONS
Thanks for participating in a office visit with the [facility] clinic today.    Doing well on a cardiac standpoint   Reviewed results of stress test and monitor - stable. No concern for blockage or structural abnormality.   Blood pressures well controlled.   Continue current medical therapy   Recommend follow up with sleep medicine and pulmonology .   Continue routine exercise.   Due for fasting lipid panel.     Follow up with  heart as needed.     Please call my nurse at   [phone]. Call with any questions or concerns.    Scheduling phone number: [phone]  Reminder: Please bring in all current medications, over the counter supplements and vitamin bottles to your next appointment.

## 2024-09-12 NOTE — PROGRESS NOTES
CARDIOLOGY CLINIC NOTE    PRIMARY CARDIOLOGIST  Dr. Conte    PRIMARY CARE PHYSICIAN:  RAMONA DALEY    HISTORY OF PRESENT ILLNESS:  Priscilla Linda is a very pleasant 74-year-old female with a past medical history significant for hypertension, hyperlipidemia, right bundle branch block, asthma, mild carotid disease and mild obstructive sleep apnea intolerant of CPAP.    She was last seen on 6/27/2024 in consultation with Dr. Conte for evaluation of exertional dyspnea. She is an avid exerciser at [employer] and noticed progressive dyspnea and fatigue over 2 months.  She tried her bronchodilator prior to exercise which made no difference.  She underwent a cardiac workup 2 years prior for similar symptoms including a stress echocardiogram negative for ischemia, normal LV function and no valvular disease.  She was noted to have 3.7% PVC burden.     She has since undergone more recent workup including a Zio patch monitor that showed no significant arrhythmia and primarily sinus rhythm.  1 episode of SVT with the longest being 10 beats.  Exercise stress echocardiogram showed no evidence of stress-induced ischemia.  EF 60 to 65% and no valvular disease.    She returns to the office today for follow-up.  Her primary complaint is exertional dyspnea, unchanged over the last few months.  She denies chest pain, palpitations, PND, orthopnea, presyncope, syncope, or lower extremity edema.  She was recently bitten by a dog to her left forearm which still shows evidence of puncture marks and bruising.    Blood pressure is well-controlled at 136/78, managed on metoprolol and triamterene/hydrochlorothiazide.  Labs in July showed an unremarkable BMP and CBC  She is due for a fasting lipid panel.  Results in 2023 showed a total cholesterol of 221, HDL 94, LDL 95 and triglycerides 162    She is very active working out regularly and doing yard work.  She does not smoke or use alcohol  She is intolerant to CPAP but uses a  mouthguard  Compliant with all medications.        PAST MEDICAL HISTORY:  Past Medical History:   Diagnosis Date    Asthma     Ganglion cyst of wrist, left 10/10/2018    GERD (gastroesophageal reflux disease)     High cholesterol     Hypertension     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    Hx of Clomid         MEDICATIONS:  Current Outpatient Medications   Medication Sig Dispense Refill    albuterol (PROAIR HFA/PROVENTIL HFA/VENTOLIN HFA) 108 (90 Base) MCG/ACT inhaler INHALE 2 PUFFS INTO THE LUNGS EVERY 6 HOURS 18 g 3    atorvastatin (LIPITOR) 80 MG tablet Take 1 tablet (80 mg) by mouth daily 90 tablet 3    fluticasone (FLOVENT HFA) 110 MCG/ACT inhaler Inhale 1 puff into the lungs 2 times daily 36 g 1    metoprolol tartrate (LOPRESSOR) 25 MG tablet Take 1 tablet (25 mg) by mouth 2 times daily 180 tablet 3    MULTI-VITAMIN/IRON OR TABS 1 TABLET DAILY      omeprazole (PRILOSEC) 20 MG DR capsule Take 1 capsule (20 mg) by mouth daily 90 capsule 3    ondansetron (ZOFRAN ODT) 4 MG ODT tab Take 1 tablet (4 mg) by mouth every 8 hours as needed for nausea 10 tablet 0    triamterene-HCTZ (MAXZIDE-25) 37.5-25 MG tablet Take 1 tablet by mouth daily 90 tablet 1    vitamin D3 (CHOLECALCIFEROL) 50 mcg (2000 units) tablet Take 1 tablet by mouth daily      valACYclovir (VALTREX) 1000 mg tablet Take 2 tablets (2,000 mg) by mouth 2 times daily for 1 day per episode 4 tablet 3     No current facility-administered medications for this visit.       SOCIAL HISTORY:  I have reviewed this patient's social history and updated it with pertinent information if needed. Priscilla SHARIF Linda  reports that she has never smoked. She has never been exposed to tobacco smoke. She has never used smokeless tobacco. She reports current alcohol use. She reports that she does not use drugs.    PHYSICAL EXAM:  Pulse:  [60] 60  BP: (136)/(78) 136/78  SpO2:  [98 %] 98 %  163 lbs 1.6 oz    Constitutional: alert, no distress  Respiratory: Good bilateral air  entry  Cardiovascular: Regular rate and rhythm  GI: nondistended  Neuropsychiatric: appropriate affact    ASSESSMENT/PLAN:  Pertinent issues addressed/ reviewed during this cardiology visit  Exertional dyspnea -etiology unclear.  Normal chest echocardiogram.  No arrhythmias on ZIO monitor.  Recommend follow-up with PCP for workup of noncardiac causes of shortness of breath.  Hypertension -well-controlled  Hyperlipidemia -due for fasting lipid panel, continue atorvastatin  Carotid artery disease -mild bilaterally  Obstructive sleep apnea -intolerant to CPAP.  Wears mouth appliance.  Offered referral to sleep medicine which she declined at this time  Asthma -follow-up with pulmonary/PCP for management    Follow-up with [facility] as needed    It was a pleasure seeing this patient in clinic today. Please do not hesitate to contact me with any future questions.     ELIU Galvan, CNP  Cardiology - [facility]  09/12/2024       The level of medical decision making during this visit was of moderate complexity.    This note was completed in part using dictation via the Dragon voice recognition software. Some word and grammatical errors may occur and must be interpreted in the appropriate clinical context.  If there are any questions pertaining to this issue, please contact me for further clarification.

## 2024-09-12 NOTE — LETTER
9/12/2024    RAMONA DALEY MD  [address]  [facility] [address]    RE: Priscilla Linda       Dear Colleague,     I had the pleasure of seeing Priscilla Linda in the [facility].  CARDIOLOGY CLINIC NOTE    PRIMARY CARDIOLOGIST  Dr. Conte    PRIMARY CARE PHYSICIAN:  RAMONA DALEY    HISTORY OF PRESENT ILLNESS:  Priscilla Linda is a very pleasant 74-year-old female with a past medical history significant for hypertension, hyperlipidemia, right bundle branch block, asthma, mild carotid disease and mild obstructive sleep apnea intolerant of CPAP.    She was last seen on 6/27/2024 in consultation with Dr. Conte for evaluation of exertional dyspnea. She is an avid exerciser at [employer] and noticed progressive dyspnea and fatigue over 2 months.  She tried her bronchodilator prior to exercise which made no difference.  She underwent a cardiac workup 2 years prior for similar symptoms including a stress echocardiogram negative for ischemia, normal LV function and no valvular disease.  She was noted to have 3.7% PVC burden.     She has since undergone more recent workup including a Zio patch monitor that showed no significant arrhythmia and primarily sinus rhythm.  1 episode of SVT with the longest being 10 beats.  Exercise stress echocardiogram showed no evidence of stress-induced ischemia.  EF 60 to 65% and no valvular disease.    She returns to the office today for follow-up.  Her primary complaint is exertional dyspnea, unchanged over the last few months.  She denies chest pain, palpitations, PND, orthopnea, presyncope, syncope, or lower extremity edema.  She was recently bitten by a dog to her left forearm which still shows evidence of puncture marks and bruising.    Blood pressure is well-controlled at 136/78, managed on metoprolol and triamterene/hydrochlorothiazide.  Labs in July showed an unremarkable BMP and CBC  She is due for a fasting lipid panel.   Results in 2023 showed a total cholesterol of 221, HDL 94, LDL 95 and triglycerides 162    She is very active working out regularly and doing yard work.  She does not smoke or use alcohol  She is intolerant to CPAP but uses a mouthguard  Compliant with all medications.        PAST MEDICAL HISTORY:  Past Medical History:   Diagnosis Date     Asthma      Ganglion cyst of wrist, left 10/10/2018     GERD (gastroesophageal reflux disease)      High cholesterol      Hypertension      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    Hx of Clomid         MEDICATIONS:  Current Outpatient Medications   Medication Sig Dispense Refill     albuterol (PROAIR HFA/PROVENTIL HFA/VENTOLIN HFA) 108 (90 Base) MCG/ACT inhaler INHALE 2 PUFFS INTO THE LUNGS EVERY 6 HOURS 18 g 3     atorvastatin (LIPITOR) 80 MG tablet Take 1 tablet (80 mg) by mouth daily 90 tablet 3     fluticasone (FLOVENT HFA) 110 MCG/ACT inhaler Inhale 1 puff into the lungs 2 times daily 36 g 1     metoprolol tartrate (LOPRESSOR) 25 MG tablet Take 1 tablet (25 mg) by mouth 2 times daily 180 tablet 3     MULTI-VITAMIN/IRON OR TABS 1 TABLET DAILY       omeprazole (PRILOSEC) 20 MG DR capsule Take 1 capsule (20 mg) by mouth daily 90 capsule 3     ondansetron (ZOFRAN ODT) 4 MG ODT tab Take 1 tablet (4 mg) by mouth every 8 hours as needed for nausea 10 tablet 0     triamterene-HCTZ (MAXZIDE-25) 37.5-25 MG tablet Take 1 tablet by mouth daily 90 tablet 1     vitamin D3 (CHOLECALCIFEROL) 50 mcg (2000 units) tablet Take 1 tablet by mouth daily       valACYclovir (VALTREX) 1000 mg tablet Take 2 tablets (2,000 mg) by mouth 2 times daily for 1 day per episode 4 tablet 3     No current facility-administered medications for this visit.       SOCIAL HISTORY:  I have reviewed this patient's social history and updated it with pertinent information if needed. Priscilla Linda  reports that she has never smoked. She has never been exposed to tobacco smoke. She has never used smokeless tobacco. She  reports current alcohol use. She reports that she does not use drugs.    PHYSICAL EXAM:  Pulse:  [60] 60  BP: (136)/(78) 136/78  SpO2:  [98 %] 98 %  163 lbs 1.6 oz    Constitutional: alert, no distress  Respiratory: Good bilateral air entry  Cardiovascular: Regular rate and rhythm  GI: nondistended  Neuropsychiatric: appropriate affact    ASSESSMENT/PLAN:  Pertinent issues addressed/ reviewed during this cardiology visit  Exertional dyspnea -etiology unclear.  Normal chest echocardiogram.  No arrhythmias on ZIO monitor.  Recommend follow-up with PCP for workup of noncardiac causes of shortness of breath.  Hypertension -well-controlled  Hyperlipidemia -due for fasting lipid panel, continue atorvastatin  Carotid artery disease -mild bilaterally  Obstructive sleep apnea -intolerant to CPAP.  Wears mouth appliance.  Offered referral to sleep medicine which she declined at this time  Asthma -follow-up with pulmonary/PCP for management    Follow-up with [facility] as needed    It was a pleasure seeing this patient in clinic today. Please do not hesitate to contact me with any future questions.     ELIU Galvan, CNP  Cardiology - [facility]  09/12/2024       The level of medical decision making during this visit was of moderate complexity.    This note was completed in part using dictation via the Dragon voice recognition software. Some word and grammatical errors may occur and must be interpreted in the appropriate clinical context.  If there are any questions pertaining to this issue, please contact me for further clarification.      Thank you for allowing me to participate in the care of your patient.      Sincerely,     ELIU Galvan CNP     [facility]  cc:   Flavio Conte MD  [address] [place] [id number]

## 2024-09-13 ENCOUNTER — LAB (OUTPATIENT)
Dept: LAB | Facility: CLINIC | Age: 74
End: 2024-09-13
Payer: MEDICARE

## 2024-09-13 DIAGNOSIS — I77.9 BILATERAL CAROTID ARTERY DISEASE, UNSPECIFIED TYPE (H): ICD-10-CM

## 2024-09-13 LAB
CHOLEST SERPL-MCNC: 203 MG/DL
FASTING STATUS PATIENT QL REPORTED: YES
HDLC SERPL-MCNC: 75 MG/DL
LDLC SERPL CALC-MCNC: 89 MG/DL
NONHDLC SERPL-MCNC: 128 MG/DL
TRIGL SERPL-MCNC: 194 MG/DL

## 2024-09-13 PROCEDURE — 80061 LIPID PANEL: CPT | Performed by: NURSE PRACTITIONER

## 2024-09-13 PROCEDURE — 36415 COLL VENOUS BLD VENIPUNCTURE: CPT | Performed by: NURSE PRACTITIONER

## 2024-09-19 ENCOUNTER — APPOINTMENT (OUTPATIENT)
Dept: URBAN - METROPOLITAN AREA CLINIC 253 | Age: 74
Setting detail: DERMATOLOGY
End: 2024-09-22

## 2024-09-19 VITALS — HEIGHT: 63 IN | RESPIRATION RATE: 14 BRPM | WEIGHT: 163 LBS

## 2024-09-19 DIAGNOSIS — Z71.89 OTHER SPECIFIED COUNSELING: ICD-10-CM

## 2024-09-19 DIAGNOSIS — D18.0 HEMANGIOMA: ICD-10-CM

## 2024-09-19 DIAGNOSIS — L82.1 OTHER SEBORRHEIC KERATOSIS: ICD-10-CM

## 2024-09-19 DIAGNOSIS — L81.4 OTHER MELANIN HYPERPIGMENTATION: ICD-10-CM

## 2024-09-19 DIAGNOSIS — D22 MELANOCYTIC NEVI: ICD-10-CM

## 2024-09-19 PROBLEM — D22.5 MELANOCYTIC NEVI OF TRUNK: Status: ACTIVE | Noted: 2024-09-19

## 2024-09-19 PROBLEM — D18.01 HEMANGIOMA OF SKIN AND SUBCUTANEOUS TISSUE: Status: ACTIVE | Noted: 2024-09-19

## 2024-09-19 PROCEDURE — OTHER MIPS QUALITY: OTHER

## 2024-09-19 PROCEDURE — OTHER COUNSELING: OTHER

## 2024-09-19 PROCEDURE — 99213 OFFICE O/P EST LOW 20 MIN: CPT

## 2024-09-19 ASSESSMENT — LOCATION ZONE DERM: LOCATION ZONE: TRUNK

## 2024-09-19 ASSESSMENT — LOCATION DETAILED DESCRIPTION DERM
LOCATION DETAILED: SUPERIOR LUMBAR SPINE
LOCATION DETAILED: INFERIOR THORACIC SPINE

## 2024-09-19 ASSESSMENT — LOCATION SIMPLE DESCRIPTION DERM
LOCATION SIMPLE: UPPER BACK
LOCATION SIMPLE: LOWER BACK

## 2024-10-22 ENCOUNTER — TRANSFERRED RECORDS (OUTPATIENT)
Dept: FAMILY MEDICINE | Facility: CLINIC | Age: 74
End: 2024-10-22

## 2024-11-22 NOTE — PROCEDURE: MIPS QUALITY
Quality 226: Preventive Care And Screening: Tobacco Use: Screening And Cessation Intervention: Patient screened for tobacco use and is an ex/non-smoker
Detail Level: Detailed
Quality 431: Preventive Care And Screening: Unhealthy Alcohol Use - Screening: Patient not identified as an unhealthy alcohol user when screened for unhealthy alcohol use using a systematic screening method
Quality 130: Documentation Of Current Medications In The Medical Record: Current Medications Documented
DISPLAY PLAN FREE TEXT

## 2024-12-02 ENCOUNTER — OFFICE VISIT (OUTPATIENT)
Dept: FAMILY MEDICINE | Facility: CLINIC | Age: 74
End: 2024-12-02

## 2024-12-02 VITALS
WEIGHT: 166 LBS | SYSTOLIC BLOOD PRESSURE: 116 MMHG | OXYGEN SATURATION: 97 % | DIASTOLIC BLOOD PRESSURE: 74 MMHG | BODY MASS INDEX: 29.41 KG/M2 | HEIGHT: 63 IN | HEART RATE: 72 BPM

## 2024-12-02 DIAGNOSIS — I10 ESSENTIAL HYPERTENSION, BENIGN: ICD-10-CM

## 2024-12-02 DIAGNOSIS — T85.848D PAIN FROM IMPLANTED HARDWARE, SUBSEQUENT ENCOUNTER: ICD-10-CM

## 2024-12-02 DIAGNOSIS — D50.9 IRON DEFICIENCY ANEMIA, UNSPECIFIED IRON DEFICIENCY ANEMIA TYPE: ICD-10-CM

## 2024-12-02 DIAGNOSIS — Z01.818 PRE-OPERATIVE GENERAL PHYSICAL EXAMINATION: Primary | ICD-10-CM

## 2024-12-02 DIAGNOSIS — Z98.890 PONV (POSTOPERATIVE NAUSEA AND VOMITING): ICD-10-CM

## 2024-12-02 DIAGNOSIS — Z98.890 S/P FOOT SURGERY, LEFT: ICD-10-CM

## 2024-12-02 DIAGNOSIS — J45.30 MILD PERSISTENT ASTHMA WITHOUT COMPLICATION: ICD-10-CM

## 2024-12-02 DIAGNOSIS — R11.2 PONV (POSTOPERATIVE NAUSEA AND VOMITING): ICD-10-CM

## 2024-12-02 LAB
ERYTHROCYTE [DISTWIDTH] IN BLOOD BY AUTOMATED COUNT: 12.4 %
HCT VFR BLD AUTO: 48.2 % (ref 35–47)
HEMOGLOBIN: 15.7 G/DL (ref 11.7–15.7)
MCH RBC QN AUTO: 31 PG (ref 26–33)
MCHC RBC AUTO-ENTMCNC: 32.6 G/DL (ref 31–36)
MCV RBC AUTO: 95 FL (ref 78–100)
PLATELET COUNT - QUEST: 303 10^9/L (ref 150–375)
RBC # BLD AUTO: 5.07 10*12/L (ref 3.8–5.2)
WBC # BLD AUTO: 8.7 10*9/L (ref 4–11)

## 2024-12-02 PROCEDURE — 36415 COLL VENOUS BLD VENIPUNCTURE: CPT | Performed by: STUDENT IN AN ORGANIZED HEALTH CARE EDUCATION/TRAINING PROGRAM

## 2024-12-02 PROCEDURE — 85027 COMPLETE CBC AUTOMATED: CPT | Performed by: STUDENT IN AN ORGANIZED HEALTH CARE EDUCATION/TRAINING PROGRAM

## 2024-12-02 PROCEDURE — 99214 OFFICE O/P EST MOD 30 MIN: CPT | Performed by: STUDENT IN AN ORGANIZED HEALTH CARE EDUCATION/TRAINING PROGRAM

## 2024-12-02 PROCEDURE — 80048 BASIC METABOLIC PNL TOTAL CA: CPT | Performed by: STUDENT IN AN ORGANIZED HEALTH CARE EDUCATION/TRAINING PROGRAM
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
## 2024-12-02 NOTE — NURSING NOTE
Chief Complaint   Patient presents with    Pre-Op Exam     Hardware removal of left foot, DOS 12/12/24, surgery being done by Dr. Eubanks with [facility], surgery being held at [facility]

## 2024-12-03 LAB
BUN SERPL-MCNC: 23 MG/DL (ref 7–25)
BUN/CREATININE RATIO: 25 (ref 6–32)
CALCIUM SERPL-MCNC: 10.2 MG/DL (ref 8.6–10.3)
CHLORIDE SERPLBLD-SCNC: 103.1 MMOL/L (ref 98–110)
CO2 SERPL-SCNC: 28.7 MMOL/L (ref 20–32)
CREAT SERPL-MCNC: 0.92 MG/DL (ref 0.6–1.3)
GLUCOSE SERPL-MCNC: 90 MG/DL (ref 60–99)
POTASSIUM SERPL-SCNC: 4.34 MMOL/L (ref 3.5–5.3)
SODIUM SERPL-SCNC: 143.5 MMOL/L (ref 135–146)

## 2024-12-08 DIAGNOSIS — I10 ESSENTIAL HYPERTENSION, BENIGN: ICD-10-CM

## 2024-12-09 RX ORDER — TRIAMTERENE AND HYDROCHLOROTHIAZIDE 37.5; 25 MG/1; MG/1
1 TABLET ORAL DAILY
COMMUNITY
Start: 2024-12-09

## 2024-12-09 NOTE — TELEPHONE ENCOUNTER
Refused Prescriptions:                       Disp   Refills    triamterene-HCTZ (MAXZIDE-25) 37.5-25 MG t*                Sig: TAKE ONE TABLET BY MOUTH ONE TIME DAILY  Refused By: DONA NARVAEZ  Reason for Refusal: OTHER    Pt has an appt on 12-26-24  Dona  [phone] (home)

## 2024-12-13 DIAGNOSIS — I10 ESSENTIAL HYPERTENSION, BENIGN: ICD-10-CM

## 2024-12-13 NOTE — TELEPHONE ENCOUNTER
Pending Prescriptions:                       Disp   Refills    triamterene-HCTZ (MAXZIDE-25) 37.5-25 MG *30 tab*0            Sig: TAKE ONE TABLET BY MOUTH ONE TIME DAILY      Duplicate, pt has an appt on 12-26-24  Sent pt a my chart message if she needs a short supply  Dona

## 2024-12-16 RX ORDER — TRIAMTERENE AND HYDROCHLOROTHIAZIDE 37.5; 25 MG/1; MG/1
1 TABLET ORAL DAILY
COMMUNITY
Start: 2024-12-16

## 2024-12-26 ENCOUNTER — OFFICE VISIT (OUTPATIENT)
Dept: FAMILY MEDICINE | Facility: CLINIC | Age: 74
End: 2024-12-26

## 2024-12-26 VITALS
HEIGHT: 62 IN | DIASTOLIC BLOOD PRESSURE: 74 MMHG | HEART RATE: 63 BPM | BODY MASS INDEX: 30.25 KG/M2 | OXYGEN SATURATION: 95 % | WEIGHT: 164.4 LBS | SYSTOLIC BLOOD PRESSURE: 116 MMHG | TEMPERATURE: 97.4 F

## 2024-12-26 DIAGNOSIS — I10 ESSENTIAL HYPERTENSION, BENIGN: ICD-10-CM

## 2024-12-26 DIAGNOSIS — J45.30 MILD PERSISTENT ASTHMA WITHOUT COMPLICATION: ICD-10-CM

## 2024-12-26 DIAGNOSIS — Z00.00 ENCOUNTER FOR MEDICARE ANNUAL WELLNESS EXAM: Primary | ICD-10-CM

## 2024-12-26 DIAGNOSIS — M85.80 OSTEOPENIA, UNSPECIFIED LOCATION: ICD-10-CM

## 2024-12-26 PROCEDURE — G0439 PPPS, SUBSEQ VISIT: HCPCS | Performed by: STUDENT IN AN ORGANIZED HEALTH CARE EDUCATION/TRAINING PROGRAM

## 2024-12-26 PROCEDURE — 99213 OFFICE O/P EST LOW 20 MIN: CPT | Mod: 25 | Performed by: STUDENT IN AN ORGANIZED HEALTH CARE EDUCATION/TRAINING PROGRAM

## 2024-12-26 RX ORDER — TRIAMTERENE AND HYDROCHLOROTHIAZIDE 37.5; 25 MG/1; MG/1
1 TABLET ORAL DAILY
Qty: 90 TABLET | Refills: 1 | Status: SHIPPED | OUTPATIENT
Start: 2024-12-26

## 2024-12-26 NOTE — PATIENT INSTRUCTIONS
No blood work needed today    No change to medication     Cardiology [phone]    Follow-up in 6 months, sooner if needed

## 2024-12-26 NOTE — NURSING NOTE
Chief Complaint   Patient presents with    Medicare Visit     AVW    Recheck Medication     Fasting med check and refill      Pre-visit Screening:  Immunizations:  up to date  Colonoscopy:  is up to date  Mammogram: is up to date  Asthma Action Test/Plan:    PHQ9:  phq2 given  GAD7:    Questioned patient about current smoking habits Pt. has never smoked.  Ok to leave detailed message on voice mail for today's visit only yes, phone # [phone] (home)
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
## 2024-12-26 NOTE — PROGRESS NOTES
"Assessment & Plan       ICD-10-CM    1. Encounter for Medicare annual wellness exam  Z00.00       2. Essential hypertension, benign  I10 triamterene-HCTZ (MAXZIDE-25) 37.5-25 MG tablet      3. Osteopenia, unspecified location  M85.80       4. Mild persistent asthma without complication  J45.30          Recent BMP reviewed, BP well controlled, continue current medicaion. Follow-up 6 months, sooner prn.     Patient Instructions   No blood work needed today    No change to medication     Cardiology [phone]    Follow-up in 6 months, sooner if needed      Reasons to follow-up sooner or seek emergent care reviewed.     Son Munoz MD, [facility]      Subjective     Priscilla Bahenalyndsay is a 74 year old female who presents to clinic today for the following health issues:    HPI   Chief Complaint   Patient presents with    Medicare Visit     AVW    Recheck Medication     Fasting med check and refill       Had one episode of fast HR while laying in bed resting, pulse ox read . Resolved after 4-5 minutes. Distinctly different from known ectopy. Has had extensive Cardiac workup has been unrevealing but hasn't captured an episode like this.     Hypertension Follow-up  Do you check your blood pressure regularly outside of the clinic? Yes   Are you following a low salt diet? Yes  Are your blood pressures ever more than 140 on the top number (systolic) OR more than 90 on the bottom number (diastolic), for example 140/90? No    BP Readings from Last 6 Encounters:   12/26/24 116/74   12/02/24 116/74   09/12/24 136/78   08/31/24 (!) 144/97   07/11/24 124/85   06/27/24 120/78           Objective    /74 (BP Location: Left arm, Patient Position: Sitting, Cuff Size: Adult Large)   Pulse 63   Temp 97.4  F (36.3  C) (Temporal)   Ht 1.575 m (5' 2\")   Wt 74.6 kg (164 lb 6.4 oz)   LMP  (LMP Unknown)   SpO2 95%   BMI 30.07 kg/m    Body mass index is 30.07 kg/m .  Alert, NAD  NC/AT  Sclerae " anicteric  RRR  Resp nonlabored  Skin warm and dry  No focal neuro deficits. Speech intact. Normal gait.  Appropriate affect     Labs reviewed.

## 2024-12-30 NOTE — TELEPHONE ENCOUNTER
Received incoming refill request for  Pending Prescriptions:                       Disp   Refills    triamterene-HCTZ (MAXZIDE-25) 37.5-25 MG *90 tab*0            Sig: TAKE ONE TABLET BY MOUTH ONE TIME DAILY    Patient last had a refill of this medication on 9/14/21 for a 90 day supply. Patient is due to be seen for an OV for refills. YOUnite message was sent to pt.   
No

## 2025-01-03 NOTE — LETTER
Patient: JESSENIA CANO [id number] 83y Female                            Hospitalist Attending Note    No complaints.  Still with cough, wheezing.  Some SOB overnight.   On O2 3L  ____________________PHYSICAL EXAM:  GENERAL:  NAD Alert and Oriented x 3   HEENT: NCAT  CARDIOVASCULAR:  S1, S2  LUNGS: coarse BS b/l.  Mild wheezing.   ABDOMEN:  soft, (-) tenderness, (-) distension, (+) bowel sounds, (-) guarding, (-) rebound (-) rigidity  EXTREMITIES:  no cyanosis / clubbing / edema.   ____________________      VITALS:  Vital Signs Last 24 Hrs  T(C): 36.8 (03 Jan 2025 17:32), Max: 36.8 (02 Jan 2025 23:40)  T(F): 98.3 (03 Jan 2025 17:32), Max: 98.3 (03 Jan 2025 17:32)  HR: 95 (03 Jan 2025 17:32) (84 - 111)  BP: 129/79 (03 Jan 2025 17:32) (117/70 - 129/79)  BP(mean): --  RR: 17 (03 Jan 2025 17:32) (17 - 18)  SpO2: 95% (03 Jan 2025 17:32) (94% - 98%)    Parameters below as of 03 Jan 2025 17:07  Patient On (Oxygen Delivery Method): nasal cannula, 3L     Daily     Daily   CAPILLARY BLOOD GLUCOSE        I&O's Summary    02 Jan 2025 07:01  -  03 Jan 2025 07:00  --------------------------------------------------------  IN: 540 mL / OUT: 700 mL / NET: -160 mL    03 Jan 2025 07:01  -  03 Jan 2025 19:26  --------------------------------------------------------  IN: 540 mL / OUT: 0 mL / NET: 540 mL        LABS:                        MEDICATIONS:  acetaminophen     Tablet .. 650 milliGRAM(s) Oral every 6 hours PRN  albuterol/ipratropium for Nebulization 3 milliLiter(s) Nebulizer every 6 hours  aluminum hydroxide/magnesium hydroxide/simethicone Suspension 30 milliLiter(s) Oral every 6 hours PRN  amLODIPine   Tablet 10 milliGRAM(s) Oral daily  benzonatate 100 milliGRAM(s) Oral three times a day PRN  calcium carbonate 1250 mG  + Vitamin D (OsCal 500 + D) 1 Tablet(s) Oral daily  cefTRIAXone   IVPB 1000 milliGRAM(s) IV Intermittent every 24 hours  cyanocobalamin 1000 MICROGram(s) Oral daily  enoxaparin Injectable 30 milliGRAM(s) SubCutaneous every 24 hours  ferrous    sulfate 325 milliGRAM(s) Oral <User Schedule>  fluticasone propionate/ salmeterol 250-50 MICROgram(s) Diskus 1 Dose(s) Inhalation two times a day  guaiFENesin Oral Liquid (Sugar-Free) 200 milliGRAM(s) Oral every 6 hours PRN  melatonin 3 milliGRAM(s) Oral at bedtime PRN  montelukast 10 milliGRAM(s) Oral daily  predniSONE   Tablet 40 milliGRAM(s) Oral daily  rosuvastatin 10 milliGRAM(s) Oral at bedtime  senna 2 Tablet(s) Oral at bedtime     My Asthma Action Plan    Name: Priscilla Linda   YOB: 1950  Date: 1/9/2024   My doctor: RAMONA DALEY MD   My clinic: [facility]        My Control Medicine: Fluticasone propionate (Flovent Diskus) - 100 mcg BID  My Rescue Medicine: Albuterol (Proair/Ventolin/Proventil HFA) 2-4 puffs EVERY 4 HOURS as needed. Use a spacer if recommended by your provider.  My Oral Steroid Medicine: No currently taking-well controlled with using Flovent inhaler BID-rarely needs to use rescue inhaler My Asthma Severity:   Moderate Persistent  Know your asthma triggers: upper respiratory infections, humidity, and cold air              GREEN ZONE   Good Control  I feel good  No cough or wheeze  Can work, sleep and play without asthma symptoms       Take your asthma control medicine every day.     If exercise triggers your asthma, take your rescue medication  15 minutes before exercise or sports, and  During exercise if you have asthma symptoms  Spacer to use with inhaler: If you have a spacer, make sure to use it with your inhaler             YELLOW ZONE Getting Worse  I have ANY of these:  I do not feel good  Cough or wheeze  Chest feels tight  Wake up at night   Keep taking your Green Zone medications  Start taking your rescue medicine:  every 20 minutes for up to 1 hour. Then every 4 hours for 24-48 hours.  If you stay in the Yellow Zone for more than 12-24 hours, contact your doctor.  If you do not return to the Green Zone in 12-24 hours or you get worse, start taking your oral steroid medicine if prescribed by your provider.           RED ZONE Medical Alert - Get Help  I have ANY of these:  I feel awful  Medicine is not helping  Breathing getting harder  Trouble walking or talking  Nose opens wide to breathe       Take your rescue medicine NOW  If your provider has prescribed an oral steroid medicine, start taking it NOW  Call your doctor NOW  If you are still in the Red Zone after 20  minutes and you have not reached your doctor:  Take your rescue medicine again and  Call 911 or go to the emergency room right away    See your regular doctor within 2 weeks of an Emergency Room or Urgent Care visit for follow-up treatment.          Annual Reminders:  Meet with Asthma Educator,  Flu Shot in the Fall, consider Pneumonia Vaccination for patients with asthma (aged 19 and older).    Pharmacy: [id number] - [place], [place] - [address] [address]     Electronically signed by RAMONA DALEY MD   Date: 01/09/24                      Asthma Triggers  How To Control Things That Make Your Asthma Worse    Triggers are things that make your asthma worse.  Look at the list below to help you find your triggers and what you can do about them.  You can help prevent asthma flare-ups by staying away from your triggers.      Trigger                                                          What you can do   Cigarette Smoke  Tobacco smoke can make asthma worse. Do not allow smoking in your home, car or around you.  Be sure no one smokes at a child s day care or school.  If you smoke, ask your health care provider for ways to help you quit.  Ask family members to quit too.  Ask your health care provider for a referral to Quit Plan to help you quit smoking, or call 8-233-636-PLAN.     Colds, Flu, Bronchitis  These are common triggers of asthma. Wash your hands often.  Don t touch your eyes, nose or mouth.  Get a flu shot every year.     Dust Mites  These are tiny bugs that live in cloth or carpet. They are too small to see. Wash sheets and blankets in hot water every week.   Encase pillows and mattress in dust mite proof covers.  Avoid having carpet if you can. If you have carpet, vacuum weekly.   Use a dust mask and HEPA vacuum.   Pollen and Outdoor Mold  Some people are allergic to trees, grass, or weed pollen, or molds. Try to keep your windows closed.  Limit time out doors when pollen count is high.   Ask  you health care provider about taking medicine during allergy season.     Animal Dander  Some people are allergic to skin flakes, urine or saliva from pets with fur or feathers. Keep pets with fur or feathers out of your home.    If you can t keep the pet outdoors, then keep the pet out of your bedroom.  Keep the bedroom door closed.  Keep pets off cloth furniture and away from stuffed toys.     Mice, Rats, and Cockroaches   Some people are allergic to the waste from these pests.   Cover food and garbage.  Clean up spills and food crumbs.  Store grease in the refrigerator.   Keep food out of the bedroom.   Indoor Mold  This can be a trigger if your home has high moisture. Fix leaking faucets, pipes, or other sources of water.   Clean moldy surfaces.  Dehumidify basement if it is damp and smelly.   Smoke, Strong Odors, and Sprays  These can reduce air quality. Stay away from strong odors and sprays, such as perfume, powder, hair spray, paints, smoke incense, paint, cleaning products, candles and new carpet.   Exercise or Sports  Some people with asthma have this trigger. Be active!  Ask your doctor about taking medicine before sports or exercise to prevent symptoms.    Warm up for 5-10 minutes before and after sports or exercise.     Other Triggers of Asthma  Cold air:  Cover your nose and mouth with a scarf.  Sometimes laughing or crying can be a trigger.  Some medicines and food can trigger asthma.

## 2025-02-19 ENCOUNTER — HOSPITAL ENCOUNTER (OUTPATIENT)
Dept: MAMMOGRAPHY | Facility: CLINIC | Age: 75
Discharge: HOME | End: 2025-02-19
Attending: STUDENT IN AN ORGANIZED HEALTH CARE EDUCATION/TRAINING PROGRAM
Payer: MEDICARE

## 2025-02-19 DIAGNOSIS — Z12.31 VISIT FOR SCREENING MAMMOGRAM: ICD-10-CM

## 2025-02-19 PROCEDURE — 77067 SCR MAMMO BI INCL CAD: CPT

## 2025-05-11 DIAGNOSIS — B00.1 RECURRENT COLD SORES: ICD-10-CM

## 2025-05-12 RX ORDER — VALACYCLOVIR HYDROCHLORIDE 1 G/1
TABLET, FILM COATED ORAL
COMMUNITY
Start: 2025-05-12

## 2025-05-12 NOTE — TELEPHONE ENCOUNTER
Refused Prescriptions:                       Disp   Refills    valACYclovir (VALTREX) 1000 mg tablet [Pha*                Sig: TAKE TWO TABLETS BY MOUTH TWICE DAILY FOR 1 EPISODE  Refused By: DONA NARVAEZ  Reason for Refusal: Patient needs appointment    Per notes at last ov 12- rtc in six months  Dona

## 2025-05-13 DIAGNOSIS — B00.1 RECURRENT COLD SORES: ICD-10-CM

## 2025-05-13 RX ORDER — VALACYCLOVIR HYDROCHLORIDE 1 G/1
TABLET, FILM COATED ORAL
Qty: 4 TABLET | Refills: 11 | Status: SHIPPED | OUTPATIENT
Start: 2025-05-13

## 2025-05-13 NOTE — TELEPHONE ENCOUNTER
Pt due for med recheck next month.    Priscilla Linda is requesting a refill of:    Pending Prescriptions:                       Disp   Refills    valACYclovir (VALTREX) 1000 mg tablet [Ph*4 tabl*0            Sig: TAKE TWO TABLETS BY MOUTH TWICE DAILY FOR 1           EPISODE

## 2025-05-22 DIAGNOSIS — I10 ESSENTIAL HYPERTENSION, BENIGN: ICD-10-CM

## 2025-05-22 DIAGNOSIS — K21.9 GASTROESOPHAGEAL REFLUX DISEASE, UNSPECIFIED WHETHER ESOPHAGITIS PRESENT: ICD-10-CM

## 2025-05-22 DIAGNOSIS — I65.22 ASYMPTOMATIC STENOSIS OF LEFT CAROTID ARTERY: ICD-10-CM

## 2025-05-22 RX ORDER — ATORVASTATIN CALCIUM 80 MG/1
80 TABLET, FILM COATED ORAL DAILY
COMMUNITY
Start: 2025-05-22

## 2025-05-22 RX ORDER — OMEPRAZOLE 20 MG/1
20 CAPSULE, DELAYED RELEASE ORAL DAILY
COMMUNITY
Start: 2025-05-22

## 2025-05-22 RX ORDER — METOPROLOL TARTRATE 25 MG/1
25 TABLET, FILM COATED ORAL 2 TIMES DAILY
COMMUNITY
Start: 2025-05-22

## 2025-05-22 NOTE — TELEPHONE ENCOUNTER
Priscilla Linda is requesting a refill of:    Refused Prescriptions:                       Disp   Refills    metoprolol tartrate (LOPRESSOR) 25 MG tabl*                Sig: TAKE ONE TABLET BY MOUTH TWICE DAILY  Refused By: GERARDO ARECHIGA  Reason for Refusal: Patient has requested refill too soon    omeprazole (PRILOSEC) 20 MG DR capsule [Ph*                Sig: TAKE ONE CAPSULE BY MOUTH ONE TIME DAILY  Refused By: GERARDO ARECHIGA  Reason for Refusal: Patient has requested refill too soon    atorvastatin (LIPITOR) 80 MG tablet [Pharm*                Sig: TAKE ONE TABLET BY MOUTH ONE TIME DAILY  Refused By: GERARDO ARECHIGA  Reason for Refusal: Patient has requested refill too soon    Refills sent on 06/21/24 enough medication until end of June

## 2025-06-17 ENCOUNTER — OFFICE VISIT (OUTPATIENT)
Dept: FAMILY MEDICINE | Facility: CLINIC | Age: 75
End: 2025-06-17

## 2025-06-17 VITALS
OXYGEN SATURATION: 95 % | TEMPERATURE: 97.7 F | SYSTOLIC BLOOD PRESSURE: 122 MMHG | WEIGHT: 166.2 LBS | BODY MASS INDEX: 30.4 KG/M2 | DIASTOLIC BLOOD PRESSURE: 74 MMHG | HEART RATE: 67 BPM

## 2025-06-17 DIAGNOSIS — I10 ESSENTIAL HYPERTENSION, BENIGN: Primary | ICD-10-CM

## 2025-06-17 DIAGNOSIS — I65.22 ASYMPTOMATIC STENOSIS OF LEFT CAROTID ARTERY: ICD-10-CM

## 2025-06-17 DIAGNOSIS — J30.9 ALLERGIC RHINITIS, UNSPECIFIED SEASONALITY, UNSPECIFIED TRIGGER: ICD-10-CM

## 2025-06-17 DIAGNOSIS — J45.30 MILD PERSISTENT ASTHMA WITHOUT COMPLICATION: ICD-10-CM

## 2025-06-17 DIAGNOSIS — K21.9 GASTROESOPHAGEAL REFLUX DISEASE, UNSPECIFIED WHETHER ESOPHAGITIS PRESENT: ICD-10-CM

## 2025-06-17 DIAGNOSIS — E78.2 MIXED HYPERLIPIDEMIA: ICD-10-CM

## 2025-06-17 LAB
BUN SERPL-MCNC: 23 MG/DL (ref 7–25)
BUN/CREATININE RATIO: 21 (ref 6–32)
CALCIUM SERPL-MCNC: 10.3 MG/DL (ref 8.6–10.3)
CHLORIDE SERPLBLD-SCNC: 102.6 MMOL/L (ref 98–110)
CO2 SERPL-SCNC: 26.6 MMOL/L (ref 20–32)
CREAT SERPL-MCNC: 1.1 MG/DL (ref 0.6–1.3)
GLUCOSE SERPL-MCNC: 101 MG/DL (ref 60–99)
POTASSIUM SERPL-SCNC: 3.76 MMOL/L (ref 3.5–5.3)
SODIUM SERPL-SCNC: 142.4 MMOL/L (ref 135–146)

## 2025-06-17 PROCEDURE — G2211 COMPLEX E/M VISIT ADD ON: HCPCS | Performed by: STUDENT IN AN ORGANIZED HEALTH CARE EDUCATION/TRAINING PROGRAM

## 2025-06-17 PROCEDURE — 99214 OFFICE O/P EST MOD 30 MIN: CPT | Performed by: STUDENT IN AN ORGANIZED HEALTH CARE EDUCATION/TRAINING PROGRAM

## 2025-06-17 PROCEDURE — 80048 BASIC METABOLIC PNL TOTAL CA: CPT | Performed by: STUDENT IN AN ORGANIZED HEALTH CARE EDUCATION/TRAINING PROGRAM

## 2025-06-17 PROCEDURE — 36415 COLL VENOUS BLD VENIPUNCTURE: CPT | Performed by: STUDENT IN AN ORGANIZED HEALTH CARE EDUCATION/TRAINING PROGRAM

## 2025-06-17 RX ORDER — OMEPRAZOLE 20 MG/1
20 CAPSULE, DELAYED RELEASE ORAL DAILY
Qty: 90 CAPSULE | Refills: 3 | Status: SHIPPED | OUTPATIENT
Start: 2025-06-17

## 2025-06-17 RX ORDER — TRIAMTERENE AND HYDROCHLOROTHIAZIDE 37.5; 25 MG/1; MG/1
1 TABLET ORAL DAILY
Qty: 90 TABLET | Refills: 1 | Status: SHIPPED | OUTPATIENT
Start: 2025-06-17

## 2025-06-17 RX ORDER — ALBUTEROL SULFATE 90 UG/1
2 INHALANT RESPIRATORY (INHALATION) EVERY 6 HOURS
Qty: 18 G | Refills: 3 | Status: SHIPPED | OUTPATIENT
Start: 2025-06-17

## 2025-06-17 RX ORDER — ATORVASTATIN CALCIUM 80 MG/1
80 TABLET, FILM COATED ORAL DAILY
Qty: 90 TABLET | Refills: 3 | Status: SHIPPED | OUTPATIENT
Start: 2025-06-17

## 2025-06-17 RX ORDER — FLUTICASONE PROPIONATE 110 UG/1
1 AEROSOL, METERED RESPIRATORY (INHALATION) 2 TIMES DAILY
Qty: 36 G | Refills: 1 | Status: SHIPPED | OUTPATIENT
Start: 2025-06-17

## 2025-06-17 RX ORDER — METOPROLOL TARTRATE 25 MG/1
25 TABLET, FILM COATED ORAL 2 TIMES DAILY
Qty: 180 TABLET | Refills: 3 | Status: SHIPPED | OUTPATIENT
Start: 2025-06-17

## 2025-06-17 ASSESSMENT — ASTHMA QUESTIONNAIRES: ACT_TOTALSCORE: 20

## 2025-06-17 NOTE — PROGRESS NOTES
Assessment & Plan     1. Mild persistent asthma without complication  2. Allergic rhinitis, unspecified seasonality, unspecified trigger  Stable, continue current inhalers   - albuterol (PROAIR HFA/PROVENTIL HFA/VENTOLIN HFA) 108 (90 Base) MCG/ACT inhaler; Inhale 2 puffs into the lungs every 6 hours.  Dispense: 18 g; Refill: 3  - fluticasone (FLOVENT HFA) 110 MCG/ACT inhaler; Inhale 1 puff into the lungs 2 times daily.  Dispense: 36 g; Refill: 1    3. Essential hypertension, benign (Primary)  Stable, labs today, continue current medication   - metoprolol tartrate (LOPRESSOR) 25 MG tablet; Take 1 tablet (25 mg) by mouth 2 times daily.  Dispense: 180 tablet; Refill: 3  - triamterene-HCTZ (MAXZIDE-25) 37.5-25 MG tablet; Take 1 tablet by mouth daily.  Dispense: 90 tablet; Refill: 1  - Basic Metabolic Panel (BFP)    4. Gastroesophageal reflux disease, unspecified whether esophagitis present  Stable, continue current medication   - omeprazole (PRILOSEC) 20 MG DR capsule; Take 1 capsule (20 mg) by mouth daily.  Dispense: 90 capsule; Refill: 3    5. Mixed hyperlipidemia  6. Asymptomatic stenosis of left carotid artery  Stable, continue current medication, labs annually, cardiology follow-up as planned  - atorvastatin (LIPITOR) 80 MG tablet; Take 1 tablet (80 mg) by mouth daily.  Dispense: 90 tablet; Refill: 3     Patient Instructions   Blood work today    No change to medications     Follow-up in 6 months      Reasons to follow-up sooner or seek emergent care reviewed.       Son Munoz MD, [facility]      Subjective     Priscilla SHARIF Alexei is a 74 year old female who presents to clinic today for the following health issues:    HPI   Chief Complaint   Patient presents with    Recheck Medication     Fating today, came in for med check, no other concerns      GERD: well controlled, no dysphagia  Significant stressors around sister's health, pt is POA, frustrations with [employer] [facility] and poor  communication.     Hypertension Follow-up  Do you check your blood pressure regularly outside of the clinic? Yes   Are you following a low salt diet? Yes  Are your blood pressures ever more than 140 on the top number (systolic) OR more than 90 on the bottom number (diastolic), for example 140/90? No    BP Readings from Last 2 Encounters:   06/17/25 122/74   12/26/24 116/74     Asthma      6/17/2025    10:01 AM   ACT Total Scores   ACT TOTAL SCORE (Goal Greater than or Equal to 20) 20   In the past 12 months, how many times did you visit the emergency room for your asthma without being admitted to the hospital? 0   In the past 12 months, how many times were you hospitalized overnight because of your asthma? 0     Do you have any of the following symptoms? None of these symptoms (cough/noisy breathing/trouble with breathing)  Do you want more information about how to use your inhaler? No        Objective    /74 (BP Location: Right arm, Patient Position: Sitting, Cuff Size: Adult Large)   Pulse 67   Temp 97.7  F (36.5  C) (Temporal)   Wt 75.4 kg (166 lb 3.2 oz)   LMP  (LMP Unknown)   SpO2 95%   BMI 30.40 kg/m    Body mass index is 30.4 kg/m .  Alert, NAD  NC/AT  Sclerae anicteric  RRR  Resp nonlabored  Skin warm and dry  No focal neuro deficits. Speech intact. Normal gait.  Appropriate affect       Labs reviewed.

## 2025-06-17 NOTE — NURSING NOTE
Chief Complaint   Patient presents with    Recheck Medication     Fating today, came in for med check, no other concerns     Pre-visit Screening:  Immunizations:  not up to date - Covid  Colonoscopy:  is up to date  Mammogram: is up to date  Asthma Action Test/Plan:  given today  PHQ9:  phq2 done today  GAD7:  na  Questioned patient about current smoking habits Pt. has never smoked.  Ok to leave detailed message on voice mail for today's visit only yes, phone # [phone] (home)